# Patient Record
Sex: FEMALE | Race: WHITE | Employment: OTHER | URBAN - METROPOLITAN AREA
[De-identification: names, ages, dates, MRNs, and addresses within clinical notes are randomized per-mention and may not be internally consistent; named-entity substitution may affect disease eponyms.]

---

## 2017-03-07 ENCOUNTER — APPOINTMENT (OUTPATIENT)
Dept: CT IMAGING | Age: 77
End: 2017-03-07
Attending: EMERGENCY MEDICINE
Payer: MEDICARE

## 2017-03-07 ENCOUNTER — APPOINTMENT (OUTPATIENT)
Dept: GENERAL RADIOLOGY | Age: 77
End: 2017-03-07
Attending: EMERGENCY MEDICINE
Payer: MEDICARE

## 2017-03-07 ENCOUNTER — HOSPITAL ENCOUNTER (EMERGENCY)
Age: 77
Discharge: HOME | End: 2017-03-07
Attending: EMERGENCY MEDICINE
Payer: MEDICARE

## 2017-03-07 VITALS
WEIGHT: 150 LBS | TEMPERATURE: 98.2 F | DIASTOLIC BLOOD PRESSURE: 66 MMHG | SYSTOLIC BLOOD PRESSURE: 115 MMHG | HEIGHT: 60 IN | RESPIRATION RATE: 18 BRPM | OXYGEN SATURATION: 96 % | BODY MASS INDEX: 29.45 KG/M2 | HEART RATE: 96 BPM

## 2017-03-07 DIAGNOSIS — S30.1XXA ABDOMINAL WALL HEMATOMA, INITIAL ENCOUNTER: Primary | ICD-10-CM

## 2017-03-07 DIAGNOSIS — E27.8 ADRENAL NODULE (HCC): ICD-10-CM

## 2017-03-07 DIAGNOSIS — S20.219A: ICD-10-CM

## 2017-03-07 LAB
ALBUMIN SERPL BCP-MCNC: 3.6 G/DL (ref 3.2–4.6)
ALBUMIN/GLOB SERPL: 1 {RATIO} (ref 1.2–3.5)
ALP SERPL-CCNC: 127 U/L (ref 50–136)
ALT SERPL-CCNC: 20 U/L (ref 12–65)
AMYLASE SERPL-CCNC: 63 U/L (ref 25–115)
ANION GAP BLD CALC-SCNC: 11 MMOL/L (ref 7–16)
AST SERPL W P-5'-P-CCNC: 23 U/L (ref 15–37)
BASOPHILS # BLD AUTO: 0 K/UL (ref 0–0.2)
BASOPHILS # BLD: 0 % (ref 0–2)
BILIRUB SERPL-MCNC: 0.4 MG/DL (ref 0.2–1.1)
BUN SERPL-MCNC: 29 MG/DL (ref 8–23)
CALCIUM SERPL-MCNC: 9.4 MG/DL (ref 8.3–10.4)
CHLORIDE SERPL-SCNC: 102 MMOL/L (ref 98–107)
CO2 SERPL-SCNC: 23 MMOL/L (ref 21–32)
CREAT SERPL-MCNC: 1.32 MG/DL (ref 0.6–1)
DIFFERENTIAL METHOD BLD: ABNORMAL
EOSINOPHIL # BLD: 0.1 K/UL (ref 0–0.8)
EOSINOPHIL NFR BLD: 1 % (ref 0.5–7.8)
ERYTHROCYTE [DISTWIDTH] IN BLOOD BY AUTOMATED COUNT: 13.6 % (ref 11.9–14.6)
GLOBULIN SER CALC-MCNC: 3.7 G/DL (ref 2.3–3.5)
GLUCOSE SERPL-MCNC: 103 MG/DL (ref 65–100)
HCT VFR BLD AUTO: 35.5 % (ref 35.8–46.3)
HGB BLD-MCNC: 12 G/DL (ref 11.7–15.4)
IMM GRANULOCYTES # BLD: 0 K/UL (ref 0–0.5)
IMM GRANULOCYTES NFR BLD AUTO: 0.3 % (ref 0–5)
LIPASE SERPL-CCNC: 183 U/L (ref 73–393)
LYMPHOCYTES # BLD AUTO: 13 % (ref 13–44)
LYMPHOCYTES # BLD: 2 K/UL (ref 0.5–4.6)
MCH RBC QN AUTO: 30.3 PG (ref 26.1–32.9)
MCHC RBC AUTO-ENTMCNC: 33.8 G/DL (ref 31.4–35)
MCV RBC AUTO: 89.6 FL (ref 79.6–97.8)
MONOCYTES # BLD: 0.9 K/UL (ref 0.1–1.3)
MONOCYTES NFR BLD AUTO: 6 % (ref 4–12)
NEUTS SEG # BLD: 12.6 K/UL (ref 1.7–8.2)
NEUTS SEG NFR BLD AUTO: 80 % (ref 43–78)
PLATELET # BLD AUTO: 288 K/UL (ref 150–450)
PMV BLD AUTO: 9.7 FL (ref 10.8–14.1)
POTASSIUM SERPL-SCNC: 4.1 MMOL/L (ref 3.5–5.1)
PROT SERPL-MCNC: 7.3 G/DL (ref 6.3–8.2)
RBC # BLD AUTO: 3.96 M/UL (ref 4.05–5.25)
SODIUM SERPL-SCNC: 136 MMOL/L (ref 136–145)
WBC # BLD AUTO: 15.7 K/UL (ref 4.3–11.1)

## 2017-03-07 PROCEDURE — 82150 ASSAY OF AMYLASE: CPT | Performed by: EMERGENCY MEDICINE

## 2017-03-07 PROCEDURE — 96375 TX/PRO/DX INJ NEW DRUG ADDON: CPT | Performed by: EMERGENCY MEDICINE

## 2017-03-07 PROCEDURE — 85025 COMPLETE CBC W/AUTO DIFF WBC: CPT | Performed by: EMERGENCY MEDICINE

## 2017-03-07 PROCEDURE — 96374 THER/PROPH/DIAG INJ IV PUSH: CPT | Performed by: EMERGENCY MEDICINE

## 2017-03-07 PROCEDURE — 74011636320 HC RX REV CODE- 636/320: Performed by: EMERGENCY MEDICINE

## 2017-03-07 PROCEDURE — 73562 X-RAY EXAM OF KNEE 3: CPT

## 2017-03-07 PROCEDURE — 74011250636 HC RX REV CODE- 250/636: Performed by: EMERGENCY MEDICINE

## 2017-03-07 PROCEDURE — 83690 ASSAY OF LIPASE: CPT | Performed by: EMERGENCY MEDICINE

## 2017-03-07 PROCEDURE — 96361 HYDRATE IV INFUSION ADD-ON: CPT | Performed by: EMERGENCY MEDICINE

## 2017-03-07 PROCEDURE — 74177 CT ABD & PELVIS W/CONTRAST: CPT

## 2017-03-07 PROCEDURE — 99284 EMERGENCY DEPT VISIT MOD MDM: CPT | Performed by: EMERGENCY MEDICINE

## 2017-03-07 PROCEDURE — 71250 CT THORAX DX C-: CPT

## 2017-03-07 PROCEDURE — 80053 COMPREHEN METABOLIC PANEL: CPT | Performed by: EMERGENCY MEDICINE

## 2017-03-07 PROCEDURE — 74011000258 HC RX REV CODE- 258: Performed by: EMERGENCY MEDICINE

## 2017-03-07 RX ORDER — MORPHINE SULFATE 4 MG/ML
2 INJECTION, SOLUTION INTRAMUSCULAR; INTRAVENOUS
Status: COMPLETED | OUTPATIENT
Start: 2017-03-07 | End: 2017-03-07

## 2017-03-07 RX ORDER — SODIUM CHLORIDE 0.9 % (FLUSH) 0.9 %
10 SYRINGE (ML) INJECTION
Status: COMPLETED | OUTPATIENT
Start: 2017-03-07 | End: 2017-03-07

## 2017-03-07 RX ORDER — SODIUM CHLORIDE 9 MG/ML
125 INJECTION, SOLUTION INTRAVENOUS CONTINUOUS
Status: DISCONTINUED | OUTPATIENT
Start: 2017-03-07 | End: 2017-03-07 | Stop reason: HOSPADM

## 2017-03-07 RX ORDER — ONDANSETRON 2 MG/ML
4 INJECTION INTRAMUSCULAR; INTRAVENOUS
Status: COMPLETED | OUTPATIENT
Start: 2017-03-07 | End: 2017-03-07

## 2017-03-07 RX ADMIN — Medication 10 ML: at 19:14

## 2017-03-07 RX ADMIN — MORPHINE SULFATE 2 MG: 4 INJECTION, SOLUTION INTRAMUSCULAR; INTRAVENOUS at 18:44

## 2017-03-07 RX ADMIN — SODIUM CHLORIDE 1000 ML: 900 INJECTION, SOLUTION INTRAVENOUS at 16:45

## 2017-03-07 RX ADMIN — ONDANSETRON 4 MG: 2 INJECTION, SOLUTION INTRAMUSCULAR; INTRAVENOUS at 18:44

## 2017-03-07 RX ADMIN — SODIUM CHLORIDE 125 ML/HR: 900 INJECTION, SOLUTION INTRAVENOUS at 19:15

## 2017-03-07 RX ADMIN — SODIUM CHLORIDE 100 ML: 900 INJECTION, SOLUTION INTRAVENOUS at 19:14

## 2017-03-07 RX ADMIN — IOVERSOL 100 ML: 741 INJECTION INTRA-ARTERIAL; INTRAVENOUS at 19:14
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
## 2017-03-07 NOTE — ED NOTES
Assumed care of patient at shift change. Patient history, physical, and results reviewed by myself. Independent exam performed. Pending CT chest and abdomen. Pt does not want anything for pain at this time.
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
## 2017-03-08 NOTE — ED NOTES
I have reviewed discharge instructions with the patient. The patient verbalized understanding. Pt denies any further needs, questions or concerns. Pt to the lobby for discharge via wheelchair with family here to take her home.
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
## 2017-05-24 ENCOUNTER — HOSPITAL ENCOUNTER (INPATIENT)
Age: 77
LOS: 3 days | Discharge: HOME HEALTH | DRG: 191 | End: 2017-05-27
Attending: EMERGENCY MEDICINE | Admitting: INTERNAL MEDICINE
Payer: MEDICARE

## 2017-05-24 ENCOUNTER — APPOINTMENT (OUTPATIENT)
Dept: GENERAL RADIOLOGY | Age: 77
DRG: 191 | End: 2017-05-24
Attending: EMERGENCY MEDICINE
Payer: MEDICARE

## 2017-05-24 DIAGNOSIS — J40 BRONCHITIS: ICD-10-CM

## 2017-05-24 DIAGNOSIS — B96.89 ACUTE BACTERIAL SINUSITIS: ICD-10-CM

## 2017-05-24 DIAGNOSIS — J01.90 ACUTE BACTERIAL SINUSITIS: ICD-10-CM

## 2017-05-24 DIAGNOSIS — E11.9 TYPE 2 DIABETES MELLITUS WITHOUT COMPLICATION, WITHOUT LONG-TERM CURRENT USE OF INSULIN (HCC): Chronic | ICD-10-CM

## 2017-05-24 DIAGNOSIS — J44.1 COPD EXACERBATION (HCC): ICD-10-CM

## 2017-05-24 DIAGNOSIS — J47.1 BRONCHIECTASIS WITH ACUTE EXACERBATION (HCC): ICD-10-CM

## 2017-05-24 DIAGNOSIS — J44.1 ACUTE EXACERBATION OF CHRONIC OBSTRUCTIVE PULMONARY DISEASE (COPD) (HCC): Primary | ICD-10-CM

## 2017-05-24 LAB
ALBUMIN SERPL BCP-MCNC: 3.2 G/DL (ref 3.2–4.6)
ALBUMIN/GLOB SERPL: 0.7 {RATIO} (ref 1.2–3.5)
ALP SERPL-CCNC: 142 U/L (ref 50–136)
ALT SERPL-CCNC: 13 U/L (ref 12–65)
ANION GAP BLD CALC-SCNC: 13 MMOL/L (ref 7–16)
AST SERPL W P-5'-P-CCNC: 10 U/L (ref 15–37)
BACTERIA URNS QL MICRO: NORMAL /HPF
BASOPHILS # BLD AUTO: 0 K/UL (ref 0–0.2)
BASOPHILS # BLD: 0 % (ref 0–2)
BILIRUB SERPL-MCNC: 0.6 MG/DL (ref 0.2–1.1)
BUN SERPL-MCNC: 19 MG/DL (ref 8–23)
CALCIUM SERPL-MCNC: 9.3 MG/DL (ref 8.3–10.4)
CASTS URNS QL MICRO: NORMAL /LPF
CHLORIDE SERPL-SCNC: 99 MMOL/L (ref 98–107)
CO2 SERPL-SCNC: 23 MMOL/L (ref 21–32)
CREAT SERPL-MCNC: 1.01 MG/DL (ref 0.6–1)
CRYSTALS URNS QL MICRO: 0 /LPF
DIFFERENTIAL METHOD BLD: ABNORMAL
EOSINOPHIL # BLD: 0 K/UL (ref 0–0.8)
EOSINOPHIL NFR BLD: 0 % (ref 0.5–7.8)
EPI CELLS #/AREA URNS HPF: 0 /HPF
ERYTHROCYTE [DISTWIDTH] IN BLOOD BY AUTOMATED COUNT: 13.1 % (ref 11.9–14.6)
GLOBULIN SER CALC-MCNC: 4.4 G/DL (ref 2.3–3.5)
GLUCOSE SERPL-MCNC: 144 MG/DL (ref 65–100)
HCT VFR BLD AUTO: 36.6 % (ref 35.8–46.3)
HGB BLD-MCNC: 12.7 G/DL (ref 11.7–15.4)
IMM GRANULOCYTES # BLD: 0.1 K/UL (ref 0–0.5)
IMM GRANULOCYTES NFR BLD AUTO: 0.3 % (ref 0–5)
LACTATE BLD-SCNC: 1.7 MMOL/L (ref 0.5–1.9)
LYMPHOCYTES # BLD AUTO: 13 % (ref 13–44)
LYMPHOCYTES # BLD: 2.4 K/UL (ref 0.5–4.6)
MCH RBC QN AUTO: 31 PG (ref 26.1–32.9)
MCHC RBC AUTO-ENTMCNC: 34.7 G/DL (ref 31.4–35)
MCV RBC AUTO: 89.3 FL (ref 79.6–97.8)
MONOCYTES # BLD: 1.2 K/UL (ref 0.1–1.3)
MONOCYTES NFR BLD AUTO: 7 % (ref 4–12)
MUCOUS THREADS URNS QL MICRO: 0 /LPF
NEUTS SEG # BLD: 14.8 K/UL (ref 1.7–8.2)
NEUTS SEG NFR BLD AUTO: 80 % (ref 43–78)
PLATELET # BLD AUTO: 279 K/UL (ref 150–450)
PMV BLD AUTO: 9.7 FL (ref 10.8–14.1)
POTASSIUM SERPL-SCNC: 4.4 MMOL/L (ref 3.5–5.1)
PROT SERPL-MCNC: 7.6 G/DL (ref 6.3–8.2)
RBC # BLD AUTO: 4.1 M/UL (ref 4.05–5.25)
RBC #/AREA URNS HPF: NORMAL /HPF
SODIUM SERPL-SCNC: 135 MMOL/L (ref 136–145)
WBC # BLD AUTO: 18.6 K/UL (ref 4.3–11.1)
WBC URNS QL MICRO: NORMAL /HPF

## 2017-05-24 PROCEDURE — 94640 AIRWAY INHALATION TREATMENT: CPT

## 2017-05-24 PROCEDURE — 99223 1ST HOSP IP/OBS HIGH 75: CPT | Performed by: INTERNAL MEDICINE

## 2017-05-24 PROCEDURE — 96365 THER/PROPH/DIAG IV INF INIT: CPT | Performed by: EMERGENCY MEDICINE

## 2017-05-24 PROCEDURE — 74011000258 HC RX REV CODE- 258: Performed by: EMERGENCY MEDICINE

## 2017-05-24 PROCEDURE — 65270000029 HC RM PRIVATE

## 2017-05-24 PROCEDURE — 80053 COMPREHEN METABOLIC PANEL: CPT | Performed by: EMERGENCY MEDICINE

## 2017-05-24 PROCEDURE — 74011250637 HC RX REV CODE- 250/637: Performed by: INTERNAL MEDICINE

## 2017-05-24 PROCEDURE — 83605 ASSAY OF LACTIC ACID: CPT

## 2017-05-24 PROCEDURE — 81015 MICROSCOPIC EXAM OF URINE: CPT | Performed by: EMERGENCY MEDICINE

## 2017-05-24 PROCEDURE — 74011000250 HC RX REV CODE- 250: Performed by: INTERNAL MEDICINE

## 2017-05-24 PROCEDURE — 87804 INFLUENZA ASSAY W/OPTIC: CPT | Performed by: INTERNAL MEDICINE

## 2017-05-24 PROCEDURE — 85025 COMPLETE CBC W/AUTO DIFF WBC: CPT | Performed by: EMERGENCY MEDICINE

## 2017-05-24 PROCEDURE — 71020 XR CHEST PA LAT: CPT

## 2017-05-24 PROCEDURE — 96375 TX/PRO/DX INJ NEW DRUG ADDON: CPT | Performed by: EMERGENCY MEDICINE

## 2017-05-24 PROCEDURE — 74011250636 HC RX REV CODE- 250/636: Performed by: EMERGENCY MEDICINE

## 2017-05-24 PROCEDURE — 74011250637 HC RX REV CODE- 250/637: Performed by: EMERGENCY MEDICINE

## 2017-05-24 PROCEDURE — 87040 BLOOD CULTURE FOR BACTERIA: CPT | Performed by: EMERGENCY MEDICINE

## 2017-05-24 PROCEDURE — 81003 URINALYSIS AUTO W/O SCOPE: CPT | Performed by: EMERGENCY MEDICINE

## 2017-05-24 PROCEDURE — 99285 EMERGENCY DEPT VISIT HI MDM: CPT | Performed by: EMERGENCY MEDICINE

## 2017-05-24 PROCEDURE — 74011000250 HC RX REV CODE- 250: Performed by: EMERGENCY MEDICINE

## 2017-05-24 PROCEDURE — 74011250636 HC RX REV CODE- 250/636: Performed by: INTERNAL MEDICINE

## 2017-05-24 RX ORDER — HYDROCODONE BITARTRATE AND HOMATROPINE METHYLBROMIDE 5; 1.5 MG/1; MG/1
1 TABLET ORAL
Status: DISCONTINUED | OUTPATIENT
Start: 2017-05-24 | End: 2017-05-27 | Stop reason: HOSPADM

## 2017-05-24 RX ORDER — SODIUM CHLORIDE 0.9 % (FLUSH) 0.9 %
5-10 SYRINGE (ML) INJECTION EVERY 8 HOURS
Status: DISCONTINUED | OUTPATIENT
Start: 2017-05-24 | End: 2017-05-27 | Stop reason: HOSPADM

## 2017-05-24 RX ORDER — ENOXAPARIN SODIUM 100 MG/ML
40 INJECTION SUBCUTANEOUS EVERY 24 HOURS
Status: DISCONTINUED | OUTPATIENT
Start: 2017-05-24 | End: 2017-05-27 | Stop reason: HOSPADM

## 2017-05-24 RX ORDER — SODIUM CHLORIDE 0.9 % (FLUSH) 0.9 %
5-10 SYRINGE (ML) INJECTION EVERY 8 HOURS
Status: DISCONTINUED | OUTPATIENT
Start: 2017-05-24 | End: 2017-05-24 | Stop reason: SDUPTHER

## 2017-05-24 RX ORDER — DULOXETIN HYDROCHLORIDE 60 MG/1
60 CAPSULE, DELAYED RELEASE ORAL 2 TIMES DAILY
Status: DISCONTINUED | OUTPATIENT
Start: 2017-05-24 | End: 2017-05-27 | Stop reason: HOSPADM

## 2017-05-24 RX ORDER — AMLODIPINE BESYLATE 5 MG/1
5 TABLET ORAL
Status: DISCONTINUED | OUTPATIENT
Start: 2017-05-24 | End: 2017-05-27 | Stop reason: HOSPADM

## 2017-05-24 RX ORDER — SODIUM CHLORIDE 0.9 % (FLUSH) 0.9 %
5-10 SYRINGE (ML) INJECTION AS NEEDED
Status: DISCONTINUED | OUTPATIENT
Start: 2017-05-24 | End: 2017-05-27 | Stop reason: HOSPADM

## 2017-05-24 RX ORDER — INSULIN LISPRO 100 [IU]/ML
INJECTION, SOLUTION INTRAVENOUS; SUBCUTANEOUS
Status: DISCONTINUED | OUTPATIENT
Start: 2017-05-24 | End: 2017-05-27 | Stop reason: HOSPADM

## 2017-05-24 RX ORDER — MONTELUKAST SODIUM 10 MG/1
10 TABLET ORAL
Status: DISCONTINUED | OUTPATIENT
Start: 2017-05-24 | End: 2017-05-27 | Stop reason: HOSPADM

## 2017-05-24 RX ORDER — DOXYCYCLINE 100 MG/1
100 CAPSULE ORAL
Status: COMPLETED | OUTPATIENT
Start: 2017-05-24 | End: 2017-05-24

## 2017-05-24 RX ORDER — BUDESONIDE 0.5 MG/2ML
500 INHALANT ORAL
Status: DISCONTINUED | OUTPATIENT
Start: 2017-05-24 | End: 2017-05-27 | Stop reason: HOSPADM

## 2017-05-24 RX ORDER — CARISOPRODOL 350 MG/1
175 TABLET ORAL
Status: DISCONTINUED | OUTPATIENT
Start: 2017-05-24 | End: 2017-05-27 | Stop reason: HOSPADM

## 2017-05-24 RX ORDER — ASPIRIN 81 MG/1
81 TABLET ORAL
Status: DISCONTINUED | OUTPATIENT
Start: 2017-05-25 | End: 2017-05-27 | Stop reason: HOSPADM

## 2017-05-24 RX ORDER — IPRATROPIUM BROMIDE AND ALBUTEROL SULFATE 2.5; .5 MG/3ML; MG/3ML
3 SOLUTION RESPIRATORY (INHALATION)
Status: COMPLETED | OUTPATIENT
Start: 2017-05-24 | End: 2017-05-24

## 2017-05-24 RX ORDER — ALBUTEROL SULFATE 0.83 MG/ML
2.5 SOLUTION RESPIRATORY (INHALATION)
Status: DISCONTINUED | OUTPATIENT
Start: 2017-05-25 | End: 2017-05-27 | Stop reason: HOSPADM

## 2017-05-24 RX ORDER — LORATADINE 10 MG/1
10 TABLET ORAL DAILY
Status: DISCONTINUED | OUTPATIENT
Start: 2017-05-25 | End: 2017-05-27 | Stop reason: HOSPADM

## 2017-05-24 RX ORDER — LEVOTHYROXINE SODIUM 100 UG/1
100 TABLET ORAL
Status: DISCONTINUED | OUTPATIENT
Start: 2017-05-25 | End: 2017-05-27 | Stop reason: HOSPADM

## 2017-05-24 RX ORDER — PANTOPRAZOLE SODIUM 40 MG/1
40 TABLET, DELAYED RELEASE ORAL
Status: DISCONTINUED | OUTPATIENT
Start: 2017-05-25 | End: 2017-05-27 | Stop reason: HOSPADM

## 2017-05-24 RX ORDER — ALPRAZOLAM 0.25 MG/1
0.25 TABLET ORAL
Status: DISCONTINUED | OUTPATIENT
Start: 2017-05-24 | End: 2017-05-27 | Stop reason: HOSPADM

## 2017-05-24 RX ORDER — SODIUM CHLORIDE 0.9 % (FLUSH) 0.9 %
5-10 SYRINGE (ML) INJECTION AS NEEDED
Status: DISCONTINUED | OUTPATIENT
Start: 2017-05-24 | End: 2017-05-24 | Stop reason: SDUPTHER

## 2017-05-24 RX ORDER — HYDROCODONE BITARTRATE AND HOMATROPINE METHYLBROMIDE 1.5; 5 MG/5ML; MG/5ML
5 SYRUP ORAL
Status: COMPLETED | OUTPATIENT
Start: 2017-05-24 | End: 2017-05-24

## 2017-05-24 RX ADMIN — METHYLPREDNISOLONE SODIUM SUCCINATE 125 MG: 125 INJECTION, POWDER, FOR SOLUTION INTRAMUSCULAR; INTRAVENOUS at 16:27

## 2017-05-24 RX ADMIN — BUDESONIDE 500 MCG: 0.5 SUSPENSION RESPIRATORY (INHALATION) at 22:07

## 2017-05-24 RX ADMIN — CEFTRIAXONE 1 G: 1 INJECTION, POWDER, FOR SOLUTION INTRAMUSCULAR; INTRAVENOUS at 16:27

## 2017-05-24 RX ADMIN — HYDROCODONE BITARTRATE AND HOMATROPINE METHYLBROMIDE 5 ML: 5; 1.5 SOLUTION ORAL at 19:23

## 2017-05-24 RX ADMIN — AMLODIPINE BESYLATE 5 MG: 10 TABLET ORAL at 21:45

## 2017-05-24 RX ADMIN — IPRATROPIUM BROMIDE AND ALBUTEROL SULFATE 3 ML: 2.5; .5 SOLUTION RESPIRATORY (INHALATION) at 16:30

## 2017-05-24 RX ADMIN — DOXYCYCLINE HYCLATE 100 MG: 100 CAPSULE ORAL at 18:33

## 2017-05-24 NOTE — ED TRIAGE NOTES
Cough and congestion, states that her MD called her in an antibiotic yesterday. States that she took a Z-deepak a week or so ago also. Just feels like she is getting worse.

## 2017-05-24 NOTE — IP AVS SNAPSHOT
Current Discharge Medication List  
  
START taking these medications Dose & Instructions Dispensing Information Comments Morning Noon Evening Bedtime  
 amoxicillin-clavulanate 875-125 mg per tablet Commonly known as:  AUGMENTIN Your last dose was: Your next dose is:    
   
   
 Dose:  1 Tab Take 1 Tab by mouth every twelve (12) hours. Quantity:  8 Tab Refills:  0  
     
   
   
   
  
 predniSONE 10 mg tablet Commonly known as:  Tamekaberlin Myerswell Start taking on:  5/28/2017 Your last dose was: Your next dose is:    
   
   
 Dose:  40 mg Take 4 Tabs by mouth daily (with breakfast). 40mg per day for 3 days, then 30mg per day for 3 days, 20mg per day for 3 days then 10mg per day for 3 days, then stop. Quantity:  30 Tab Refills:  0 CONTINUE these medications which have NOT CHANGED Dose & Instructions Dispensing Information Comments Morning Noon Evening Bedtime ALPRAZolam 0.5 mg tablet Commonly known as:  César Madrigal Your last dose was: Your next dose is:    
   
   
 1/2 to 1 tablet three times daily, as needed; can take 1 1/2 or two at bedtime Quantity:  60 Tab Refills:  5  
     
   
   
   
  
 amLODIPine 5 mg tablet Commonly known as:  Jack Sadler Your last dose was: Your next dose is:    
   
   
 Dose:  5 mg Take 1 Tab by mouth nightly. Indications: Hypertension Quantity:  90 Tab Refills:  3  
     
   
   
   
  
 aspirin delayed-release 81 mg tablet Your last dose was: Your next dose is:    
   
   
 Dose:  81 mg Take 81 mg by mouth every morning. Indications: MYOCARDIAL INFARCTION PREVENTION, has not had since SCS trial 11/9/15 Refills:  0  
     
   
   
   
  
 budesonide-formoterol 160-4.5 mcg/actuation HFA inhaler Commonly known as:  SYMBICORT Your last dose was: Your next dose is:    
   
   
 Dose:  2 Puff Take 2 Puffs by inhalation two (2) times a day. Quantity:  1 Inhaler Refills:  11  
     
   
   
   
  
 calcium citrate-vitamin d3 315-200 mg-unit Tab Commonly known as:  CITRACAL+D Your last dose was: Your next dose is:    
   
   
 Dose:  2 Tab Take 2 Tabs by mouth two (2) times a day. Indications: POST-MENOPAUSAL OSTEOPOROSIS PREVENTION, crush pills with pill  Quantity:  120 Tab Refills:  11  
     
   
   
   
  
 cetirizine 10 mg tablet Commonly known as:  ZYRTEC Your last dose was: Your next dose is:    
   
   
 Dose:  10 mg Take 10 mg by mouth daily. Refills:  0  
     
   
   
   
  
 DALIRESP Tab tablet Generic drug:  roflumilast  
   
Your last dose was: Your next dose is:    
   
   
 Dose:  500 mcg Take 500 mcg by mouth daily. Refills:  0 DULoxetine 60 mg capsule Commonly known as:  CYMBALTA Your last dose was: Your next dose is:    
   
   
 Dose:  60 mg Take 1 Cap by mouth two (2) times a day. Indications: ANXIETY WITH DEPRESSION, CHRONIC MUSCULOSKELETAL PAIN Quantity:  180 Cap Refills:  3  
     
   
   
   
  
 levothyroxine 100 mcg tablet Commonly known as:  SYNTHROID Your last dose was: Your next dose is:    
   
   
 Dose:  100 mcg Take 1 Tab by mouth Daily (before breakfast). Indications: take dos Quantity:  90 Tab Refills:  3  
     
   
   
   
  
 montelukast 10 mg tablet Commonly known as:  SINGULAIR Your last dose was: Your next dose is:    
   
   
 Dose:  10 mg Take 1 Tab by mouth nightly. Indications: ASTHMA PREVENTION Quantity:  90 Tab Refills:  3  
     
   
   
   
  
 omeprazole 40 mg capsule Commonly known as:  PRILOSEC Your last dose was: Your next dose is:    
   
   
 Dose:  40 mg Take 1 Cap by mouth daily. Take one hour before supper on an empty stomach Quantity:  90 Cap Refills:  3 PROBIOTIC 4X 10-15 mg Tbec Generic drug:  B.infantis-B.ani-B.long-B.bifi Your last dose was: Your next dose is:    
   
   
 Dose:  1 Tab Take 1 Tab by mouth daily (with lunch). Refills:  0 PROVENTIL HFA 90 mcg/actuation inhaler Generic drug:  albuterol Your last dose was: Your next dose is:    
   
   
 Dose:  2 Puff Take 2 Puffs by inhalation every four (4) hours as needed for Wheezing. Refills:  0 Where to Get Your Medications These medications were sent to [address] [address] Phone:  [phone]  
  amoxicillin-clavulanate 875-125 mg per tablet Information on where to get these meds will be given to you by the nurse or doctor. ! Ask your nurse or doctor about these medications  
  predniSONE 10 mg tablet

## 2017-05-24 NOTE — H&P
HISTORY AND PHYSICAL      Guerda Quesada    5/24/2017    Date of Admission:  5/24/2017    The patient's chart is reviewed and the patient is discussed with the staff. Subjective:     Patient is a 65 y.o.  female presents with cough and shortness of breath. She states she has a history of COPD and is on symbicort and prn albuterol at home. She reports that about 2 weeks ago she developed sinus congestion and pressure. She was treated with z pack by her pcp but her symptoms progressively worsened. She then developed cough productive of purulent sounding sputum as well as fevers and diffuse myalgias. She reports wheeze and shortness of breath. [facility] but WBC was 18 and she had diffuse wheezing. She has been treated in the ER but is felt to need hospital admission for ongoing wheeze and COPD exacerbation. Review of Systems  A comprehensive review of systems was negative except for that written in the HPI.     Patient Active Problem List   Diagnosis Code    Carotid stenosis, right I65.21    Left carotid stenosis I65.22    Hypertension I10    COPD exacerbation ([facility] [address] J44.1    Hypoxemia R09.02    Obesity E66.9    GERD (gastroesophageal reflux disease) K21.9    Arthritis M19.90    Depression F32.9    Anxiety F41.9    Hypothyroidism E03.9    Abnormal chest CT R93.8    Parotitis K11.20    Pulmonary hypertension ([facility]) I27.2    COPD (chronic obstructive pulmonary disease) ([facility]) J44.9    LUIS FERNANDO on CPAP G47.33, Z99.89    Pulmonary nodule R91.1    S/p bilateral carotid endarterectomy Z98.890    CAD (coronary artery disease) I25.10    Diabetes mellitus, type II ([facility]) E11.9    Hyperlipidemia E78.5    Carotid artery stenosis without cerebral infarction I65.29    Dyspnea/shortness of breath R06.00    Spondylosis with myelopathy, lumbar region M47.16    Chronic pain syndrome G89.4    Proteinuria R80.9    [facility] I77.9    Pruritic disorder L29.9    Cholelithiasis K80.20    Diverticulitis K57.92    Menopausal disorder N95.9    Chronic back pain M54.9, G89.29    Neuropathy G62.9    Bronchiectasis ([facility]) J47.9    Asthma J45.909    Leg pain M79.606    Macular degeneration H35.30    PVD (peripheral vascular disease) ([facility]) I73.9    Chronic restrictive lung disease J98.4    Chronic renal insufficiency N18.9    Congestive heart failure (CHF) ([facility]) I50.9    Bronchitis J40    Acute bacterial sinusitis J01.90, B96.89       Prior to Admission Medications   Prescriptions Last Dose Informant Patient Reported? Taking? ALPRAZolam (XANAX) 0.5 mg tablet   No No   Si/2 to 1 tablet three times daily, as needed; can take 1 1/2 or two at bedtime   B.infantis-B.ani-B.long-B.bifi (PROBIOTIC 4X) 10-15 mg TbEC   Yes No   Sig: Take 1 Tab by mouth daily (with lunch). DULoxetine (CYMBALTA) 60 mg capsule   No No   Sig: Take 1 Cap by mouth two (2) times a day. Indications: ANXIETY WITH DEPRESSION, CHRONIC MUSCULOSKELETAL PAIN   amLODIPine (NORVASC) 5 mg tablet   No No   Sig: Take 1 Tab by mouth nightly. Indications: Hypertension   aspirin delayed-release 81 mg tablet   Yes No   Sig: Take 81 mg by mouth every morning. Indications: MYOCARDIAL INFARCTION PREVENTION, has not had since [facility] trial 11/9/15   budesonide-formoterol (SYMBICORT) 160-4.5 mcg/actuation HFA inhaler   No No   Sig: Take 2 Puffs by inhalation two (2) times a day. calcium citrate-vitamin d3 (CITRACAL+D) 315-200 mg-unit tab   No No   Sig: Take 2 Tabs by mouth two (2) times a day. Indications: POST-MENOPAUSAL OSTEOPOROSIS PREVENTION, crush pills with pill    carisoprodol (SOMA) 350 mg tablet   No No   Sig: Take 0.5-1 Tabs by mouth nightly. Max Daily Amount: 350 mg. Indications: MUSCLE SPASM   cetirizine (ZYRTEC) 10 mg tablet   Yes No   Sig: Take 10 mg by mouth daily.    levothyroxine (SYNTHROID) 100 mcg tablet   No No   Sig: Take 1 Tab by mouth Daily (before breakfast). Indications: take dos   montelukast (SINGULAIR) 10 mg tablet   No No   Sig: Take 1 Tab by mouth nightly. Indications: ASTHMA PREVENTION   omeprazole (PRILOSEC) 40 mg capsule   No No   Sig: Take 1 Cap by mouth daily. Take one hour before supper on an empty stomach   roflumilast (DALIRESP) tab tablet   Yes No   Sig: Take 500 mcg by mouth daily. Facility-Administered Medications: None       Past Medical History:   Diagnosis Date    Abnormal chest CT 1/13/2015    Impression:  1. No evidence of pulmonary embolism. 2. Bilateral lung opacities (most prominent in the lower lobes) are nonspecific  but likely infectious or inflammatory, possibly aspiration related. 3. Emphysema. 4. Tiny indeterminate lung nodules for which followup CT is recommended in 6  months. 5. Atherosclerotic disease.  Allergic rhinitis     Anxiety 1/12/2015    Arthritis     Asthma     Bronchiectasis ([facility] [address]     CAD (coronary artery disease) 06/17/2015    CT of chest March 2017 showed coronary calcification also    Carotid artery stenosis without cerebral infarction 9/28/2015    1. Bilateral CEA in 2014.  Carotid stenosis, right 4/8/2014 4/14/14 (Dr. Kike Schwartz) Right carotid endarterectomy with bovine pericardium  patch.       Cholelithiasis     Chronic back pain     Chronic pain syndrome 11/25/2015    Chronic renal insufficiency     Chronic restrictive lung disease     Congestive heart failure (CHF) (HCC)     COPD (chronic obstructive pulmonary disease) (HCC)     Decreased vision     Depression     and anxiety     Diabetes mellitus, type II ([facility] [address]     Diarrhea     Diverticulitis     Dizziness     Dry eyes     Dyspnea/shortness of breath 9/28/2015    GERD (gastroesophageal reflux disease)     not well controlled    Glaucoma     History of pneumonia 1/2015    Hyperkalemia     Hyperlipidemia      Hypertension     Hyposmolality and/or hyponatremia     Hypothyroidism     Leg pain     Macular degeneration     Menopausal disorder     Neuropathy     LUIS FERNANDO on CPAP 4/9/2015    Proteinuria     Pruritic disorder     [facility]) [facility]     SOB (shortness of breath) on exertion     Spinal stenosis     Trigger thumb     Unspecified asthma     inhalers as needed    Unspecified sleep apnea     cpap non- compliant at times    Venous stasis      Past Surgical History:   Procedure Laterality Date    HX APPENDECTOMY      HX BACK SURGERY  11/9/15    [facility] TRIAL    HX CAROTID ENDARTERECTOMY Left 1/14    HX CAROTID ENDARTERECTOMY Right 4/14    HX CATARACT REMOVAL Bilateral     2014 - Right first; left second    HX LUMBAR FUSION      HX MYRINGOTOMY Right     x2    HX OTHER SURGICAL  02/14/2000    back synovial cyst    HX HERNÁN AND BSO  1978    pain and bleeding     Social History     Social History    Marital status:      Spouse name: N/A    Number of children: N/A    Years of education: N/A     Occupational History    Not on file. Social History Main Topics    Smoking status: Former Smoker     Packs/day: 1.00     Years: 30.00    Smokeless tobacco: Never Used      Comment: quit 2011    Alcohol use No    Drug use: No    Sexual activity: Not on file     Other Topics Concern    Not on file     Social History Narrative    Lives alone, granddaughter lives next door. 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       Family History   Problem Relation Age of Onset    Diabetes Brother     Cancer Mother     Breast Cancer Mother     Heart Disease Father     Heart Attack Father     Cancer Sister      uterine     Allergies   Allergen Reactions    Sulfa (Sulfonamide Antibiotics) Rash    Abilify [Aripiprazole] Other (comments)     Causes involuntary mouth movement     Chantix [Varenicline] Nausea Only    Desyrel [Trazodone] Other (comments)     nightmares    Lipitor [Atorvastatin] Other (comments)     Legs hurt    Lisinopril Other (comments)     Cough? ?    Nolamine Other (comments)     Legs jerk    Other Medication Other (comments)     Hx of low sodium so watch meds that can aggravate that    Zocor [Simvastatin] Other (comments)     Causes leg pain. Lf leg. Current Facility-Administered Medications   Medication Dose Route Frequency    sodium chloride (NS) flush 5-10 mL  5-10 mL IntraVENous Q8H    sodium chloride (NS) flush 5-10 mL  5-10 mL IntraVENous PRN     Current Outpatient Prescriptions   Medication Sig    calcium citrate-vitamin d3 (CITRACAL+D) 315-200 mg-unit tab Take 2 Tabs by mouth two (2) times a day. Indications: POST-MENOPAUSAL OSTEOPOROSIS PREVENTION, crush pills with pill     DULoxetine (CYMBALTA) 60 mg capsule Take 1 Cap by mouth two (2) times a day. Indications: ANXIETY WITH DEPRESSION, CHRONIC MUSCULOSKELETAL PAIN    roflumilast (DALIRESP) tab tablet Take 500 mcg by mouth daily.  budesonide-formoterol (SYMBICORT) 160-4.5 mcg/actuation HFA inhaler Take 2 Puffs by inhalation two (2) times a day.  ALPRAZolam (XANAX) 0.5 mg tablet 1/2 to 1 tablet three times daily, as needed; can take 1 1/2 or two at bedtime    omeprazole (PRILOSEC) 40 mg capsule Take 1 Cap by mouth daily. Take one hour before supper on an empty stomach    amLODIPine (NORVASC) 5 mg tablet Take 1 Tab by mouth nightly. Indications: Hypertension    levothyroxine (SYNTHROID) 100 mcg tablet Take 1 Tab by mouth Daily (before breakfast). Indications: take dos    montelukast (SINGULAIR) 10 mg tablet Take 1 Tab by mouth nightly. Indications: ASTHMA PREVENTION    carisoprodol (SOMA) 350 mg tablet Take 0.5-1 Tabs by mouth nightly. Max Daily Amount: 350 mg. Indications: MUSCLE SPASM    cetirizine (ZYRTEC) 10 mg tablet Take 10 mg by mouth daily.  B.infantis-B.ani-B.long-B.bifi (PROBIOTIC 4X) 10-15 mg TbEC Take 1 Tab by mouth daily (with lunch).  aspirin delayed-release 81 mg tablet Take 81 mg by mouth every morning.  Indications: MYOCARDIAL INFARCTION PREVENTION, has not had since [facility] trial 11/9/15           Objective:     Vitals:    05/24/17 1520 05/24/17 1638 05/24/17 1720 05/24/17 1820   BP: 115/55  175/74 113/53   Pulse: 94  100 90   Resp:       Temp:       SpO2: 95% 95% 95% 95%   Weight:       Height:           PHYSICAL EXAM     Constitutional:  the patient is well developed and in no acute distress  EENMT:  Sclera clear, pupils equal, oral mucosa moist  Respiratory: Mild diffuse B exp wheeze. Cardiovascular:  RRR without M,G,R  Gastrointestinal: soft and non-tender; with positive bowel sounds. Musculoskeletal: warm without cyanosis. There is no lower leg edema. Skin:  no jaundice or rashes, no wounds   Neurologic: no gross neuro deficits     Psychiatric:  alert and oriented x ppt    CXR:  5/24/17  IMPRESSION: No acute findings or changes in the chest.       Recent Labs      05/24/17   1305   WBC  18.6*   HGB  12.7   HCT  36.6   PLT  279     Recent Labs      05/24/17   1305   NA  135*   K  4.4   CL  99   GLU  144*   CO2  23   BUN  19   CREA  1.01*   CA  9.3   ALB  3.2   TBILI  0.6   ALT  13   SGOT  10*     No results for input(s): PH, PCO2, PO2, HCO3 in the last 72 hours. No results for input(s): LCAD, LAC in the last 72 hours. Assessment:  (Medical Decision Making)     Hospital Problems  Date Reviewed: 4/25/2017          Codes Class Noted POA    Bronchitis ICD-10-CM: J40  ICD-9-CM: 841  5/24/2017 Unknown        Acute bacterial sinusitis ICD-10-CM: J01.90, B96.89  ICD-9-CM: 461.9  5/24/2017 Unknown        * (Principal)[facility] ICD-10-CM: J44.1  ICD-9-CM: 491.21  1/9/2015 Yes            Patient presenting with signs of COPD exacerbation likely brought on by sinusitis and URI. Plan:  (Medical Decision Making)     --Will admit for further medical management  --Supplemental O2 only if needed   --Respiratory nebulizer treatments: albuterol and pulmicort. --culture sputum if able.    --steroid therapy: IV solumedrol. --antibiotic therapy: ceftriaxone and doxy as she has recently received azithromycin and reported has not tolerated levaquin in the past.   --will flu swab given significant myalgias    More than 50% of the time documented was spent in face-to-face contact with the patient and in the care of the patient on the floor/unit where the patient is located.     Anna Casarez MD

## 2017-05-24 NOTE — IP AVS SNAPSHOT
[address] [facility] 
[phone] Patient: Gavino Khoury MRN: [id number] :[id number] You are allergic to the following Allergen Reactions Sulfa (Sulfonamide Antibiotics) Rash Abilify (Aripiprazole) Other (comments) Causes involuntary mouth movement Chantix (Varenicline) Nausea Only Desyrel (Trazodone) Other (comments)  
 nightmares Lipitor (Atorvastatin) Other (comments) Legs hurt Lisinopril Other (comments) Cough? ?  
    
 Nolamine Other (comments) Legs jerk Other Medication Other (comments) Hx of low sodium so watch meds that can aggravate that Zocor (Simvastatin) Other (comments) Causes leg pain. Lf leg. Recent Documentation Height Weight BMI OB Status Smoking Status 1.575 m 68.9 kg 27.76 kg/m2 Hysterectomy Former Smoker Unresulted Labs Order Current Status CULTURE, BLOOD Preliminary result CULTURE, BLOOD Preliminary result CULTURE, RESPIRATORY/SPUTUM/BRONCH W GRAM STAIN Preliminary result Emergency Contacts Name Discharge Info Relation Home Work Mobile Gayathri Cohn  Daughter [21] [phone] Av Yap  Daughter [21] [phone] About your hospitalization You were admitted on:  May 24, 2017 You last received care in the:  [facility] 6 MED SURG You were discharged on:  May 27, 2017 Unit phone number:  [phone] Why you were hospitalized Your primary diagnosis was:  Copd Exacerbation (Hcc) Your diagnoses also included:  Bronchitis, Acute Bacterial Sinusitis, Diabetes Mellitus, Type Ii (Hcc) Providers Seen During Your Hospitalizations Provider Role Specialty Primary office phone Yaneth Keane MD Attending Provider Emergency Medicine [phone] Juan Shaw MD Attending Provider Pulmonary Disease [phone] Your Primary Care Physician (PCP) Primary Care Physician Office Phone Office Fax [address] [facility], [address] [place] [address] [phone] Follow-up Information Follow up With Details Comments Contact Info [address] [place] [id number] 
[phone] Eliana Prater MD In 1 week  [address] [address] 
[phone] Your Appointments Tuesday June 27, 2017  2:00 PM EDT New Patient with Rufino Solomon MD  
[facility]) [address] [place] [address] [place] [id number] [phone] Current Discharge Medication List  
  
START taking these medications Dose & Instructions Dispensing Information Comments Morning Noon Evening Bedtime  
 amoxicillin-clavulanate 875-125 mg per tablet Commonly known as:  AUGMENTIN Your last dose was: Your next dose is:    
   
   
 Dose:  1 Tab Take 1 Tab by mouth every twelve (12) hours. Quantity:  8 Tab Refills:  0  
     
   
   
   
  
 predniSONE 10 mg tablet Commonly known as:  Shahana Berea Start taking on:  5/28/2017 Your last dose was: Your next dose is:    
   
   
 Dose:  40 mg Take 4 Tabs by mouth daily (with breakfast). 40mg per day for 3 days, then 30mg per day for 3 days, 20mg per day for 3 days then 10mg per day for 3 days, then stop. Quantity:  30 Tab Refills:  0 CONTINUE these medications which have NOT CHANGED Dose & Instructions Dispensing Information Comments Morning Noon Evening Bedtime ALPRAZolam 0.5 mg tablet Commonly known as:  Darrius Webb Your last dose was: Your next dose is:    
   
   
 1/2 to 1 tablet three times daily, as needed; can take 1 1/2 or two at bedtime Quantity:  60 Tab Refills:  5  
     
   
   
   
  
 amLODIPine 5 mg tablet Commonly known as:  Ajay Padilla Your last dose was: Your next dose is:    
   
   
 Dose:  5 mg Take 1 Tab by mouth nightly. Indications: Hypertension Quantity:  90 Tab Refills:  3  
     
   
   
   
  
 aspirin delayed-release 81 mg tablet Your last dose was: Your next dose is:    
   
   
 Dose:  81 mg Take 81 mg by mouth every morning. Indications: MYOCARDIAL INFARCTION PREVENTION, has not had since SCS trial 11/9/15 Refills:  0  
     
   
   
   
  
 budesonide-formoterol 160-4.5 mcg/actuation HFA inhaler Commonly known as:  SYMBICORT Your last dose was: Your next dose is:    
   
   
 Dose:  2 Puff Take 2 Puffs by inhalation two (2) times a day. Quantity:  1 Inhaler Refills:  11  
     
   
   
   
  
 calcium citrate-vitamin d3 315-200 mg-unit Tab Commonly known as:  CITRACAL+D Your last dose was: Your next dose is:    
   
   
 Dose:  2 Tab Take 2 Tabs by mouth two (2) times a day. Indications: POST-MENOPAUSAL OSTEOPOROSIS PREVENTION, crush pills with pill  Quantity:  120 Tab Refills:  11  
     
   
   
   
  
 cetirizine 10 mg tablet Commonly known as:  ZYRTEC Your last dose was: Your next dose is:    
   
   
 Dose:  10 mg Take 10 mg by mouth daily. Refills:  0  
     
   
   
   
  
 DALIRESP Tab tablet Generic drug:  roflumilast  
   
Your last dose was: Your next dose is:    
   
   
 Dose:  500 mcg Take 500 mcg by mouth daily. Refills:  0 DULoxetine 60 mg capsule Commonly known as:  CYMBALTA Your last dose was: Your next dose is:    
   
   
 Dose:  60 mg Take 1 Cap by mouth two (2) times a day. Indications: ANXIETY WITH DEPRESSION, CHRONIC MUSCULOSKELETAL PAIN Quantity:  180 Cap Refills:  3  
     
   
   
   
  
 levothyroxine 100 mcg tablet Commonly known as:  SYNTHROID Your last dose was: Your next dose is: Dose:  100 mcg Take 1 Tab by mouth Daily (before breakfast). Indications: take dos Quantity:  90 Tab Refills:  3  
     
   
   
   
  
 montelukast 10 mg tablet Commonly known as:  SINGULAIR Your last dose was: Your next dose is:    
   
   
 Dose:  10 mg Take 1 Tab by mouth nightly. Indications: ASTHMA PREVENTION Quantity:  90 Tab Refills:  3  
     
   
   
   
  
 omeprazole 40 mg capsule Commonly known as:  PRILOSEC Your last dose was: Your next dose is:    
   
   
 Dose:  40 mg Take 1 Cap by mouth daily. Take one hour before supper on an empty stomach Quantity:  90 Cap Refills:  3 PROBIOTIC 4X 10-15 mg Tbec Generic drug:  B.infantis-B.ani-B.long-B.bifi Your last dose was: Your next dose is:    
   
   
 Dose:  1 Tab Take 1 Tab by mouth daily (with lunch). Refills:  0 PROVENTIL HFA 90 mcg/actuation inhaler Generic drug:  albuterol Your last dose was: Your next dose is:    
   
   
 Dose:  2 Puff Take 2 Puffs by inhalation every four (4) hours as needed for Wheezing. Refills:  0 Where to Get Your Medications These medications were sent to [address] [address] Phone:  [phone]  
  amoxicillin-clavulanate 875-125 mg per tablet Information on where to get these meds will be given to you by the nurse or doctor. ! Ask your nurse or doctor about these medications  
  predniSONE 10 mg tablet Discharge Instructions Bronchitis: Care Instructions Your Care Instructions Bronchitis is inflammation of the bronchial tubes, which carry air to the lungs. The tubes swell and produce mucus, or phlegm. The mucus and inflamed bronchial tubes make you cough. You may have trouble breathing. Most cases of bronchitis are caused by viruses like those that cause colds. Antibiotics usually do not help and they may be harmful. Bronchitis usually develops rapidly and lasts about 2 to 3 weeks in otherwise healthy people. Follow-up care is a key part of your treatment and safety. Be sure to make and go to all appointments, and call your doctor if you are having problems. It's also a good idea to know your test results and keep a list of the medicines you take. How can you care for yourself at home? · Take all medicines exactly as prescribed. Call your doctor if you think you are having a problem with your medicine. · Get some extra rest. 
· Take an over-the-counter pain medicine, such as acetaminophen (Tylenol), ibuprofen (Advil, Motrin), or naproxen (Aleve) to reduce fever and relieve body aches. Read and follow all instructions on the label. · Do not take two or more pain medicines at the same time unless the doctor told you to. Many pain medicines have acetaminophen, which is Tylenol. Too much acetaminophen (Tylenol) can be harmful. · Take an over-the-counter cough medicine that contains dextromethorphan to help quiet a dry, hacking cough so that you can sleep. Avoid cough medicines that have more than one active ingredient. Read and follow all instructions on the label. · Breathe moist air from a humidifier, hot shower, or sink filled with hot water. The heat and moisture will thin mucus so you can cough it out. · Do not smoke. Smoking can make bronchitis worse. If you need help quitting, talk to your doctor about stop-smoking programs and medicines. These can increase your chances of quitting for good. When should you call for help? Call 911 anytime you think you may need emergency care. For example, call if: 
· You have severe trouble breathing. Call your doctor now or seek immediate medical care if: 
· You have new or worse trouble breathing. · You cough up dark brown or bloody mucus (sputum). · You have a new or higher fever. · You have a new rash. Watch closely for changes in your health, and be sure to contact your doctor if: 
· You cough more deeply or more often, especially if you notice more mucus or a change in the color of your mucus. · You are not getting better as expected. Where can you learn more? Go to http://vamsi-hardik.info/. Enter H333 in the search box to learn more about \"Bronchitis: Care Instructions. \" Current as of: May 23, 2016 Content Version: 11.2 © 1896-5990 Outbox. Care instructions adapted under license by [employer] (which disclaims liability or warranty for this information). If you have questions about a medical condition or this instruction, always ask your healthcare professional. [address] any warranty or liability for your use of this information. Chronic Obstructive Pulmonary Disease (COPD): Care Instructions Your Care Instructions Chronic obstructive pulmonary disease (COPD) is a general term for a group of lung diseases, including emphysema and chronic bronchitis. People with COPD have decreased airflow in and out of the lungs, which makes it hard to breathe. The airways also can get clogged with thick mucus. Cigarette smoking is a major cause of COPD. Although there is no cure for COPD, you can slow its progress. Following your treatment plan and taking care of yourself can help you feel better and live longer. Follow-up care is a key part of your treatment and safety. Be sure to make and go to all appointments, and call your doctor if you are having problems. It's also a good idea to know your test results and keep a list of the medicines you take. How can you care for yourself at home? Staying healthy · Do not smoke.  This is the most important step you can take to prevent more damage to your lungs. If you need help quitting, talk to your doctor about stop-smoking programs and medicines. These can increase your chances of quitting for good. · Avoid colds and flu. Get a pneumococcal vaccine shot. If you have had one before, ask your doctor whether you need a second dose. Get the flu vaccine every fall. If you must be around people with colds or the flu, wash your hands often. · Avoid secondhand smoke, air pollution, and high altitudes. Also avoid cold, dry air and hot, humid air. Stay at home with your windows closed when air pollution is bad. Medicines and oxygen therapy · Take your medicines exactly as prescribed. Call your doctor if you think you are having a problem with your medicine. · You may be taking medicines such as: ¨ Bronchodilators. These help open your airways and make breathing easier. Bronchodilators are either short-acting (work for 6 to 9 hours) or long-acting (work for 24 hours). You inhale most bronchodilators, so they start to act quickly. Always carry your quick-relief inhaler with you in case you need it while you are away from home. ¨ Corticosteroids (prednisone, budesonide). These reduce airway inflammation. They come in pill or inhaled form. You must take these medicines every day for them to work well. · A spacer may help you get more inhaled medicine to your lungs. Ask your doctor or pharmacist if a spacer is right for you. If it is, ask how to use it properly. · Do not take any vitamins, over-the-counter medicine, or herbal products without talking to your doctor first. 
· If your doctor prescribed antibiotics, take them as directed. Do not stop taking them just because you feel better. You need to take the full course of antibiotics. · Oxygen therapy boosts the amount of oxygen in your blood and helps you breathe easier. Use the flow rate your doctor has recommended, and do not change it without talking to your doctor first. 
Activity · Get regular exercise. Walking is an easy way to get exercise. Start out slowly, and walk a little more each day. · Pay attention to your breathing. You are exercising too hard if you cannot talk while you are exercising. · Take short rest breaks when doing household chores and other activities. · Learn breathing methodssuch as breathing through pursed lipsto help you become less short of breath. · If your doctor has not set you up with a pulmonary rehabilitation program, talk to him or her about whether rehab is right for you. Rehab includes exercise programs, education about your disease and how to manage it, help with diet and other changes, and emotional support. Diet · Eat regular, healthy meals. Use bronchodilators about 1 hour before you eat to make it easier to eat. Eat several small meals instead of three large ones. Drink beverages at the end of the meal. Avoid foods that are hard to chew. · Eat foods that contain protein so that you do not lose muscle mass. Mental health · Talk to your family, friends, or a therapist about your feelings. It is normal to feel frightened, angry, hopeless, helpless, and even guilty. Talking openly about bad feelings can help you cope. If these feelings last, talk to your doctor. When should you call for help? Call 911 anytime you think you may need emergency care. For example, call if: 
· You have severe trouble breathing. Call your doctor now or seek immediate medical care if: 
· You have new or worse trouble breathing. · You cough up blood. · You have a fever. Watch closely for changes in your health, and be sure to contact your doctor if: 
· You cough more deeply or more often, especially if you notice more mucus or a change in the color of your mucus. · You have new or worse swelling in your legs or belly. · You are not getting better as expected. Where can you learn more? Go to http://vamsi-hardik.info/. Melchor Salazar in the search box to learn more about \"Chronic Obstructive Pulmonary Disease (COPD): Care Instructions. \" Current as of: May 23, 2016 Content Version: 11.2 © 1512-5347 [employer]OM. Care instructions adapted under license by [employer] (which disclaims liability or warranty for this information). If you have questions about a medical condition or this instruction, always ask your healthcare professional. [place] [address] any warranty or liability for your use of this information. DISCHARGE SUMMARY from Nurse The following personal items are in your possession at time of discharge: 
 
Dental Appliances: None Home Medications: None Jewelry: None Clothing: Shirt, Pants, Footwear, Undergarments Other Valuables: None PATIENT INSTRUCTIONS: 
 
 
F-face looks uneven A-arms unable to move or move unevenly S-speech slurred or non-existent T-time-call 911 as soon as signs and symptoms begin-DO NOT go Back to bed or wait to see if you get better-TIME IS BRAIN. Warning Signs of HEART ATTACK Call 911 if you have these symptoms: 
? Chest discomfort. Most heart attacks involve discomfort in the center of the chest that lasts more than a few minutes, or that goes away and comes back. It can feel like uncomfortable pressure, squeezing, fullness, or pain. ? Discomfort in other areas of the upper body. Symptoms can include pain or discomfort in one or both arms, the back, neck, jaw, or stomach. ? Shortness of breath with or without chest discomfort. ? Other signs may include breaking out in a cold sweat, nausea, or lightheadedness. Don't wait more than five minutes to call [address]! Fast action can save your life.  Calling 911 is almost always the fastest way to get lifesaving treatment. Emergency Medical Services staff can begin treatment when they arrive  up to an hour sooner than if someone gets to the hospital by car. The discharge information has been reviewed with the patient. The patient verbalized understanding. Discharge medications reviewed with the patient and appropriate educational materials and side effects teaching were provided. Discharge Orders None Trusteerhart Announcement We are excited to announce that we are making your provider's discharge notes available to you in Rizzoma. You will see these notes when they are completed and signed by the physician that discharged you from your recent hospital stay. If you have any questions or concerns about any information you see in TrusteerharMetooo, please call the Health Information Department where you were seen or reach out to your Primary Care Provider for more information about your plan of care. Introducing [facility]! Jason Lieberman introduces Rizzoma patient portal. Now you can access parts of your medical record, email your doctor's office, and request medication refills online. 1. In your internet browser, go to [email or web address] 2. Click on the First Time User? Click Here link in the Sign In box. You will see the New Member Sign Up page. 3. Enter your Rizzoma Access Code exactly as it appears below. You will not need to use this code after youve completed the sign-up process. If you do not sign up before the expiration date, you must request a new code. · Rizzoma Access Code: [email or web address] Expires: 6/5/2017  1:43 PM 
 
4. Enter the last four digits of your Social Security Number (xxxx) and Date of Birth (mm/dd/yyyy) as indicated and click Submit. You will be taken to the next sign-up page. 5. Create a Rizzoma ID. This will be your Rizzoma login ID and cannot be changed, so think of one that is secure and easy to remember. 6. Create a Richmedia password. You can change your password at any time. 7. Enter your Password Reset Question and Answer. This can be used at a later time if you forget your password. 8. Enter your e-mail address. You will receive e-mail notification when new information is available in [address] 9. Click Sign Up. You can now view and download portions of your medical record. 10. Click the Download Summary menu link to download a portable copy of your medical information. If you have questions, please visit the Frequently Asked Questions section of the Richmedia website. Remember, Richmedia is NOT to be used for urgent needs. For medical emergencies, dial 911. Now available from your iPhone and Android! General Information Please provide this summary of care documentation to your next provider. Patient Signature:  ____________________________________________________________ Date:  ____________________________________________________________  
  
Baron Dawsonbs Provider Signature:  ____________________________________________________________ Date:  ____________________________________________________________

## 2017-05-24 NOTE — ED PROVIDER NOTES
HPI Comments: Patient is a 78-year-old female who is coming in with one week of cough that has been productive. She's also had some congestion and generalized fatigue. She has a history of COPD and states she is not on home oxygen and has been using her inhalers at home. Her PCP also called in a antibiotic and she's taken a course of azithromycin but it has not improved her symptoms. Patient is a 65 y.o. female presenting with cough and fatigue. The history is provided by the patient. Cough   Associated symptoms include shortness of breath and wheezing. Pertinent negatives include no chest pain, no nausea and no vomiting. Fatigue   Associated symptoms include shortness of breath. Pertinent negatives include no chest pain, no vomiting and no nausea. Past Medical History:   Diagnosis Date    Abnormal chest CT 1/13/2015    Impression:  1. No evidence of pulmonary embolism. 2. Bilateral lung opacities (most prominent in the lower lobes) are nonspecific  but likely infectious or inflammatory, possibly aspiration related. 3. Emphysema. 4. Tiny indeterminate lung nodules for which followup CT is recommended in 6  months. 5. Atherosclerotic disease.  Allergic rhinitis     Anxiety 1/12/2015    Arthritis     Asthma     Bronchiectasis ([address]     CAD (coronary artery disease) 06/17/2015    CT of chest March 2017 showed coronary calcification also    Carotid artery stenosis without cerebral infarction 9/28/2015    1. Bilateral CEA in 2014.  Carotid stenosis, right 4/8/2014 4/14/14 (Dr. Gayathri Slade) Right carotid endarterectomy with bovine pericardium  patch.       Cholelithiasis     Chronic back pain     Chronic pain syndrome 11/25/2015    Chronic renal insufficiency     Chronic restrictive lung disease     Congestive heart failure (CHF) (HCC)     COPD (chronic obstructive pulmonary disease) (HCC)     Decreased vision     Depression     and anxiety     Diabetes mellitus, type II ([facility] [address] [facility]     Diarrhea     Diverticulitis     Dizziness     Dry eyes     Dyspnea/shortness of breath 9/28/2015    GERD (gastroesophageal reflux disease)     not well controlled    Glaucoma     History of pneumonia 1/2015    Hyperkalemia     Hyperlipidemia      Hypertension     Hyposmolality and/or hyponatremia     Hypothyroidism     Leg pain     Macular degeneration     Menopausal disorder     Neuropathy     LUIS FERNANDO on CPAP 4/9/2015    Proteinuria     Pruritic disorder     PVD (peripheral vascular disease) (HCC)     SOB (shortness of breath) on exertion     Spinal stenosis     Trigger thumb     Unspecified asthma     inhalers as needed    Unspecified sleep apnea     cpap non- compliant at times    Venous stasis        Past Surgical History:   Procedure Laterality Date    HX APPENDECTOMY      HX BACK SURGERY  11/9/15    SCS TRIAL    HX CAROTID ENDARTERECTOMY Left 1/14    HX CAROTID ENDARTERECTOMY Right 4/14    HX CATARACT REMOVAL Bilateral     2014 - Right first; left second    HX LUMBAR FUSION      HX MYRINGOTOMY Right     x2    HX OTHER SURGICAL  02/14/2000    back synovial cyst    HX HERNÁN AND BSO  1978    pain and bleeding         Family History:   Problem Relation Age of Onset    Diabetes Brother     Cancer Mother     Breast Cancer Mother     Heart Disease Father     Heart Attack Father     Cancer Sister      uterine       Social History     Social History    Marital status:      Spouse name: N/A    Number of children: N/A    Years of education: N/A     Occupational History    Not on file. Social History Main Topics    Smoking status: Former Smoker     Packs/day: 1.00     Years: 30.00    Smokeless tobacco: Never Used      Comment: quit 2011    Alcohol use No    Drug use: No    Sexual activity: Not on file     Other Topics Concern    Not on file     Social History Narrative    Lives alone, granddaughter lives next door.  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 ALLERGIES: Sulfa (sulfonamide antibiotics); Abilify [aripiprazole]; Chantix [varenicline]; Desyrel [trazodone]; Lipitor [atorvastatin]; Lisinopril; Nolamine; Other medication; and Zocor [simvastatin]    Review of Systems   Constitutional: Positive for activity change and fatigue. Negative for appetite change. Respiratory: Positive for cough, shortness of breath and wheezing. Negative for chest tightness. Cardiovascular: Negative for chest pain and palpitations. Gastrointestinal: Negative for abdominal pain, diarrhea, nausea and vomiting. Skin: Negative. Vitals:    05/24/17 1302 05/24/17 1440 05/24/17 1520   BP: 105/51 134/60 115/55   Pulse: (!) 103 95 94   Resp: 18     Temp: 97.7 °F (36.5 °C)     SpO2: 97% 99% 95%   Weight: 69.9 kg (154 lb)     Height: 5' 2\" (1.575 m)              Physical Exam   Constitutional: She is oriented to person, place, and time. She appears well-developed and well-nourished. No distress. HENT:   Head: Normocephalic and atraumatic. Eyes: Conjunctivae are normal. No scleral icterus. Neck: Normal range of motion. Neck supple. Cardiovascular: Normal rate, regular rhythm and normal heart sounds. Pulmonary/Chest: No stridor. No respiratory distress. She has wheezes. She has no rales. She exhibits no tenderness. tachypena     Abdominal: Soft. She exhibits no distension. There is no tenderness. There is no rebound and no guarding. Neurological: She is alert and oriented to person, place, and time. No focal weakness   Skin: Skin is warm and dry. No rash noted. She is not diaphoretic. No erythema. Psychiatric: She has a normal mood and affect. Her behavior is normal.   Nursing note and vitals reviewed.        MDM  Number of Diagnoses or Management Options  Acute exacerbation of chronic obstructive pulmonary disease (COPD) [facility]):   Diagnosis management comments: Patient has clinical symptoms of a pneumonia with productive cough while I'm in the room and white blood cell count of 18,000 she was empirically treated for this. She also was still working somewhat hard to breathe because I spoke with pulmonary to evaluate for admission for further care. Yaneth Keane MD; 5/24/2017 @10:04 PM Voice dictation software was used during the making of this note. This software is not perfect and grammatical and other typographical errors may be present.   This note has not been proofread for errors.  ===================================================================        Amount and/or Complexity of Data Reviewed  Clinical lab tests: ordered and reviewed (Results for orders placed or performed during the hospital encounter of 05/24/17  -CBC WITH AUTOMATED DIFF       Result                                            Value                         Ref Range                       WBC                                               18.6 (H)                      4.3 - 11.1 K/uL                 RBC                                               4.10                          4.05 - 5.25 M/uL                HGB                                               12.7                          11.7 - 15.4 g/dL                HCT                                               36.6                          35.8 - 46.3 %                   MCV                                               89.3                          79.6 - 97.8 FL                  MCH                                               31.0                          26.1 - 32.9 PG                  MCHC                                              34.7                          31.4 - 35.0 g/dL                RDW                                               13.1                          11.9 - 14.6 %                   PLATELET                                          279                           150 - 450 K/uL                  MPV                                               9.7 (L) 10.8 - 14.1 FL                  DF                                                AUTOMATED                                                     NEUTROPHILS                                       80 (H)                        43 - 78 %                       LYMPHOCYTES                                       13                            13 - 44 %                       MONOCYTES                                         7                             4.0 - 12.0 %                    EOSINOPHILS                                       0 (L)                         0.5 - 7.8 %                     BASOPHILS                                         0                             0.0 - 2.0 %                     IMMATURE GRANULOCYTES                             0.3                           0.0 - 5.0 %                     ABS. NEUTROPHILS                                  14.8 (H)                      1.7 - 8.2 K/UL                  ABS. LYMPHOCYTES                                  2.4                           0.5 - 4.6 K/UL                  ABS. MONOCYTES                                    1.2                           0.1 - 1.3 K/UL                  ABS. EOSINOPHILS                                  0.0                           0.0 - 0.8 K/UL                  ABS. BASOPHILS                                    0.0                           0.0 - 0.2 K/UL                  ABS. IMM.  GRANS.                                  0.1                           0.0 - 0.5 K/UL             -METABOLIC PANEL, COMPREHENSIVE       Result                                            Value                         Ref Range                       Sodium                                            135 (L)                       136 - 145 mmol/L                Potassium                                         4.4                           3.5 - 5.1 mmol/L                Chloride                                          99                            98 - 107 mmol/L                 CO2                                               23                            21 - 32 mmol/L                  Anion gap                                         13                            7 - 16 mmol/L                   Glucose                                           144 (H)                       65 - 100 mg/dL                  BUN                                               19                            8 - 23 MG/DL                    Creatinine                                        1.01 (H)                      0.6 - 1.0 MG/DL                 GFR est AA                                        >60                           >60 ml/min/1.73m2               GFR est non-AA                                    56 (L)                        >60 ml/min/1.73m2               Calcium                                           9.3                           8.3 - 10.4 MG/DL                Bilirubin, total                                  0.6                           0.2 - 1.1 MG/DL                 ALT (SGPT)                                        13                            12 - 65 U/L                     AST (SGOT)                                        10 (L)                        15 - 37 U/L                     Alk. phosphatase                                  142 (H)                       50 - 136 U/L                    Protein, total                                    7.6                           6.3 - 8.2 g/dL                  Albumin                                           3.2                           3.2 - 4.6 g/dL                  Globulin                                          4.4 (H)                       2.3 - 3.5 g/dL                  A-G Ratio                                         0.7 (L)                       1.2 - 3.5                  -URINE MICROSCOPIC       Result                                            Value                         Ref Range WBC                                               3-5                           0 /hpf                          RBC                                               0-3                           0 /hpf                          Epithelial cells                                  0                             0 /hpf                          Bacteria                                          TRACE                         0 /hpf                          Casts                                             3-5                           0 /lpf                          Crystals, urine                                   0                             0 /LPF                          Mucus                                             0                             0 /lpf                     -POC LACTIC ACID       Result                                            Value                         Ref Range                       Lactic Acid (POC)                                 1.7                           0.5 - 1.9 mmol/L          )  Tests in the radiology section of CPT®: reviewed and ordered (Xr Chest Pa Lat    Result Date: 5/24/2017  PA AND LATERAL CHEST X-RAY  5/24/2017 HISTORY:  Cough and congestion COMPARISON:  7/11/2016 FINDINGS:  PA and lateral views demonstrate hyperexpanded lungs. Interstitial basilar lung findings appear chronic. No definite acute infiltrate. Cardiac shadow and aortic shadow unchanged. Mild thoracic kyphosis without focal compression fracture. Dorsal column stimulator device again noted.      IMPRESSION:  No acute findings or changes in the chest.     )      ED Course       Procedures

## 2017-05-25 LAB
ANION GAP BLD CALC-SCNC: 10 MMOL/L (ref 7–16)
BACTERIA SPEC CULT: NORMAL
BUN SERPL-MCNC: 23 MG/DL (ref 8–23)
CALCIUM SERPL-MCNC: 9.5 MG/DL (ref 8.3–10.4)
CHLORIDE SERPL-SCNC: 99 MMOL/L (ref 98–107)
CO2 SERPL-SCNC: 23 MMOL/L (ref 21–32)
CREAT SERPL-MCNC: 0.9 MG/DL (ref 0.6–1)
ERYTHROCYTE [DISTWIDTH] IN BLOOD BY AUTOMATED COUNT: 13.1 % (ref 11.9–14.6)
FLUAV AG NPH QL IA: NEGATIVE
FLUBV AG NPH QL IA: NEGATIVE
GLUCOSE BLD STRIP.AUTO-MCNC: 171 MG/DL (ref 65–100)
GLUCOSE BLD STRIP.AUTO-MCNC: 234 MG/DL (ref 65–100)
GLUCOSE BLD STRIP.AUTO-MCNC: 242 MG/DL (ref 65–100)
GLUCOSE BLD STRIP.AUTO-MCNC: 254 MG/DL (ref 65–100)
GLUCOSE SERPL-MCNC: 208 MG/DL (ref 65–100)
HCT VFR BLD AUTO: 35.4 % (ref 35.8–46.3)
HGB BLD-MCNC: 12.2 G/DL (ref 11.7–15.4)
MCH RBC QN AUTO: 30.3 PG (ref 26.1–32.9)
MCHC RBC AUTO-ENTMCNC: 34.5 G/DL (ref 31.4–35)
MCV RBC AUTO: 88.1 FL (ref 79.6–97.8)
PLATELET # BLD AUTO: 280 K/UL (ref 150–450)
PMV BLD AUTO: 9.6 FL (ref 10.8–14.1)
POTASSIUM SERPL-SCNC: 4.7 MMOL/L (ref 3.5–5.1)
RBC # BLD AUTO: 4.02 M/UL (ref 4.05–5.25)
SERVICE CMNT-IMP: NORMAL
SODIUM SERPL-SCNC: 132 MMOL/L (ref 136–145)
WBC # BLD AUTO: 8.8 K/UL (ref 4.3–11.1)

## 2017-05-25 PROCEDURE — 74011250637 HC RX REV CODE- 250/637: Performed by: INTERNAL MEDICINE

## 2017-05-25 PROCEDURE — 74011000258 HC RX REV CODE- 258: Performed by: INTERNAL MEDICINE

## 2017-05-25 PROCEDURE — 77030018836 HC SOL IRR NACL ICUM -A

## 2017-05-25 PROCEDURE — 74011636637 HC RX REV CODE- 636/637: Performed by: INTERNAL MEDICINE

## 2017-05-25 PROCEDURE — 74011000250 HC RX REV CODE- 250: Performed by: INTERNAL MEDICINE

## 2017-05-25 PROCEDURE — 74011250636 HC RX REV CODE- 250/636: Performed by: PHYSICIAN ASSISTANT

## 2017-05-25 PROCEDURE — 99232 SBSQ HOSP IP/OBS MODERATE 35: CPT | Performed by: INTERNAL MEDICINE

## 2017-05-25 PROCEDURE — 94760 N-INVAS EAR/PLS OXIMETRY 1: CPT

## 2017-05-25 PROCEDURE — 82962 GLUCOSE BLOOD TEST: CPT

## 2017-05-25 PROCEDURE — 85027 COMPLETE CBC AUTOMATED: CPT | Performed by: INTERNAL MEDICINE

## 2017-05-25 PROCEDURE — 87070 CULTURE OTHR SPECIMN AEROBIC: CPT | Performed by: INTERNAL MEDICINE

## 2017-05-25 PROCEDURE — 65270000029 HC RM PRIVATE

## 2017-05-25 PROCEDURE — 77030013140 HC MSK NEB VYRM -A

## 2017-05-25 PROCEDURE — 80048 BASIC METABOLIC PNL TOTAL CA: CPT | Performed by: INTERNAL MEDICINE

## 2017-05-25 PROCEDURE — 94640 AIRWAY INHALATION TREATMENT: CPT

## 2017-05-25 PROCEDURE — 87641 MR-STAPH DNA AMP PROBE: CPT | Performed by: INTERNAL MEDICINE

## 2017-05-25 PROCEDURE — 74011250636 HC RX REV CODE- 250/636: Performed by: INTERNAL MEDICINE

## 2017-05-25 RX ORDER — ALBUTEROL SULFATE 90 UG/1
2 AEROSOL, METERED RESPIRATORY (INHALATION)
COMMUNITY
End: 2020-11-09 | Stop reason: SDUPTHER

## 2017-05-25 RX ADMIN — DULOXETINE HYDROCHLORIDE 60 MG: 60 CAPSULE, DELAYED RELEASE ORAL at 10:16

## 2017-05-25 RX ADMIN — INSULIN LISPRO 2 UNITS: 100 INJECTION, SOLUTION INTRAVENOUS; SUBCUTANEOUS at 01:30

## 2017-05-25 RX ADMIN — MONTELUKAST SODIUM 10 MG: 10 TABLET, FILM COATED ORAL at 21:15

## 2017-05-25 RX ADMIN — MONTELUKAST SODIUM 10 MG: 10 TABLET, FILM COATED ORAL at 01:19

## 2017-05-25 RX ADMIN — DOXYCYCLINE 100 MG: 100 INJECTION, POWDER, LYOPHILIZED, FOR SOLUTION INTRAVENOUS at 06:52

## 2017-05-25 RX ADMIN — Medication 10 ML: at 16:39

## 2017-05-25 RX ADMIN — DOXYCYCLINE 100 MG: 100 INJECTION, POWDER, LYOPHILIZED, FOR SOLUTION INTRAVENOUS at 17:35

## 2017-05-25 RX ADMIN — METHYLPREDNISOLONE SODIUM SUCCINATE 60 MG: 125 INJECTION, POWDER, FOR SOLUTION INTRAMUSCULAR; INTRAVENOUS at 11:48

## 2017-05-25 RX ADMIN — AMLODIPINE BESYLATE 5 MG: 10 TABLET ORAL at 21:06

## 2017-05-25 RX ADMIN — ALBUTEROL SULFATE 2.5 MG: 2.5 SOLUTION RESPIRATORY (INHALATION) at 19:14

## 2017-05-25 RX ADMIN — INSULIN LISPRO 2 UNITS: 100 INJECTION, SOLUTION INTRAVENOUS; SUBCUTANEOUS at 21:06

## 2017-05-25 RX ADMIN — DULOXETINE HYDROCHLORIDE 60 MG: 60 CAPSULE, DELAYED RELEASE ORAL at 01:20

## 2017-05-25 RX ADMIN — PANTOPRAZOLE SODIUM 40 MG: 40 TABLET, DELAYED RELEASE ORAL at 10:16

## 2017-05-25 RX ADMIN — Medication 10 ML: at 11:49

## 2017-05-25 RX ADMIN — CEFTRIAXONE 1 G: 1 INJECTION, POWDER, FOR SOLUTION INTRAMUSCULAR; INTRAVENOUS at 16:38

## 2017-05-25 RX ADMIN — ASPIRIN 81 MG: 81 TABLET, COATED ORAL at 11:47

## 2017-05-25 RX ADMIN — ENOXAPARIN SODIUM 40 MG: 40 INJECTION SUBCUTANEOUS at 23:19

## 2017-05-25 RX ADMIN — ALBUTEROL SULFATE 2.5 MG: 2.5 SOLUTION RESPIRATORY (INHALATION) at 11:30

## 2017-05-25 RX ADMIN — ALPRAZOLAM 0.25 MG: 0.25 TABLET ORAL at 01:30

## 2017-05-25 RX ADMIN — BUDESONIDE 500 MCG: 0.5 SUSPENSION RESPIRATORY (INHALATION) at 07:40

## 2017-05-25 RX ADMIN — METHYLPREDNISOLONE SODIUM SUCCINATE 40 MG: 125 INJECTION, POWDER, FOR SOLUTION INTRAMUSCULAR; INTRAVENOUS at 21:06

## 2017-05-25 RX ADMIN — Medication 10 ML: at 21:12

## 2017-05-25 RX ADMIN — METHYLPREDNISOLONE SODIUM SUCCINATE 60 MG: 125 INJECTION, POWDER, FOR SOLUTION INTRAMUSCULAR; INTRAVENOUS at 01:19

## 2017-05-25 RX ADMIN — ENOXAPARIN SODIUM 40 MG: 40 INJECTION SUBCUTANEOUS at 01:21

## 2017-05-25 RX ADMIN — ALBUTEROL SULFATE 2.5 MG: 2.5 SOLUTION RESPIRATORY (INHALATION) at 07:40

## 2017-05-25 RX ADMIN — DULOXETINE HYDROCHLORIDE 60 MG: 60 CAPSULE, DELAYED RELEASE ORAL at 17:34

## 2017-05-25 RX ADMIN — INSULIN LISPRO 3 UNITS: 100 INJECTION, SOLUTION INTRAVENOUS; SUBCUTANEOUS at 17:34

## 2017-05-25 RX ADMIN — BUDESONIDE 500 MCG: 0.5 SUSPENSION RESPIRATORY (INHALATION) at 19:14

## 2017-05-25 RX ADMIN — ALBUTEROL SULFATE 2.5 MG: 2.5 SOLUTION RESPIRATORY (INHALATION) at 15:07

## 2017-05-25 RX ADMIN — INSULIN LISPRO 1 UNITS: 100 INJECTION, SOLUTION INTRAVENOUS; SUBCUTANEOUS at 10:16

## 2017-05-25 RX ADMIN — LEVOTHYROXINE SODIUM 100 MCG: 100 TABLET ORAL at 10:16

## 2017-05-25 RX ADMIN — LORATADINE 10 MG: 10 TABLET ORAL at 11:54

## 2017-05-25 NOTE — PROGRESS NOTES
Jayden Galdamez  Admission Date: 5/24/2017             Daily Progress Note: 5/25/2017    The patient's chart is reviewed and the patient is discussed with the staff. Pt is a 64 yo  female with a history of COPD, LUIS FERNANDO on CPAP, HTN, PAH, pulmonary nodule, and chronic pain. Pt presented to the ER on 5/24 with wheezing, cough with purulent sputum, and myalgias. Pt was admitted for COPD exacerbation. Subjective:     Pt lying in bed on RA. Pt coughing up yellow sputum. Pt still has weakness and myalgias.      Current Facility-Administered Medications   Medication Dose Route Frequency    doxycycline (VIBRAMYCIN) 100 mg in 0.9% sodium chloride (MBP/ADV) 100 mL  100 mg IntraVENous Q12H    ALPRAZolam (XANAX) tablet 0.25 mg  0.25 mg Oral TID PRN    amLODIPine (NORVASC) tablet 5 mg  5 mg Oral QHS    aspirin delayed-release tablet 81 mg  81 mg Oral 7am    carisoprodol (SOMA) tablet 175 mg  175 mg Oral QHS    loratadine (CLARITIN) tablet 10 mg  10 mg Oral DAILY    DULoxetine (CYMBALTA) capsule 60 mg  60 mg Oral BID    levothyroxine (SYNTHROID) tablet 100 mcg  100 mcg Oral ACB    montelukast (SINGULAIR) tablet 10 mg  10 mg Oral QHS    pantoprazole (PROTONIX) tablet 40 mg  40 mg Oral ACB    roflumilast (DALIRESP) tablet 500 mcg - PATIENT SUPPLIED  500 mcg Oral DAILY    sodium chloride (NS) flush 5-10 mL  5-10 mL IntraVENous Q8H    sodium chloride (NS) flush 5-10 mL  5-10 mL IntraVENous PRN    albuterol (PROVENTIL VENTOLIN) nebulizer solution 2.5 mg  2.5 mg Nebulization QID RT    methylPREDNISolone (PF) (SOLU-MEDROL) injection 60 mg  60 mg IntraVENous Q6H    budesonide (PULMICORT) 500 mcg/2 ml nebulizer suspension  500 mcg Nebulization BID RT    enoxaparin (LOVENOX) injection 40 mg  40 mg SubCUTAneous Q24H    HYDROcodone-homatropine (HYCODAN) tablet 1 Tab  1 Tab Oral Q4H PRN    insulin lispro (HUMALOG) injection   SubCUTAneous AC&HS    cefTRIAXone (ROCEPHIN) 1 g in 0.9% sodium chloride (MBP/ADV) 50 mL  1 g IntraVENous Q24H       Review of Systems  +cough  +weakness  +fatigue  Constitutional: negative for fever, chills, sweats  Cardiovascular: negative for chest pain, palpitations, syncope, edema  Gastrointestinal:  negative for dysphagia, reflux, vomiting, diarrhea, abdominal pain, or melena  Neurologic:  negative for numbness, headache    Objective:     Vitals:    05/25/17 0900 05/25/17 1000 05/25/17 1018 05/25/17 1130   BP: 110/53 116/57  115/55   Pulse: 94 94 89 86   Resp:    17   Temp:    97.6 °F (36.4 °C)   SpO2: 90% 93% 94% 92%   Weight:       Height:         Intake and Output:           Physical Exam:   Constitution:  the patient is well developed and in no acute distress, on RA  EENMT:  Sclera clear, pupils equal, oral mucosa moist  Respiratory: faint insp wheezing  Cardiovascular:  RRR without M,G,R  Gastrointestinal: soft and non-tender; with positive bowel sounds. Musculoskeletal: warm without cyanosis. There is no lower leg edema. Skin:  no jaundice or rashes   Neurologic: no gross neuro deficits     Psychiatric:  alert and oriented x 3    CHEST XRAY:           LAB  Recent Labs      05/25/17   0827  05/25/17   0124   GLUCPOC  171*  242*      Recent Labs      05/25/17   0428  05/24/17   1305   WBC  8.8  18.6*   HGB  12.2  12.7   HCT  35.4*  36.6   PLT  280  279     Recent Labs      05/25/17   0428  05/24/17   1305   NA  132*  135*   K  4.7  4.4   CL  99  99   CO2  23  23   GLU  208*  144*   BUN  23  19   CREA  0.90  1.01*   CA  9.5  9.3   ALB   --   3.2   TBILI   --   0.6   ALT   --   13   SGOT   --   10*     No results for input(s): PH, PCO2, PO2, HCO3 in the last 72 hours. No results for input(s): LCAD, LAC in the last 72 hours.       Assessment:  (Medical Decision Making)     Hospital Problems  Date Reviewed: 5/25/2017          Codes Class Noted POA    Bronchitis-on Rocephin/Doxycycline ICD-10-CM: J40  ICD-9-CM: 451  5/24/2017 Unknown        Acute bacterial sinusitis-continue abx ICD-10-CM: J01.90, B96.89  ICD-9-CM: 461.9  5/24/2017 Unknown        * (Principal)COPD exacerbation (HCC)-continue abx, solumedrol ICD-10-CM: J44.1  ICD-9-CM: 491.21  1/9/2015 Yes              Plan:  (Medical Decision Making)     --on RA  --Rocephin/Doxycycline  --on solumedrol, wean dose  --continue nebs  --continue daliresp  --hopefully home soon    More than 50% of the time documented was spent in face-to-face contact with the patient and in the care of the patient on the floor/unit where the patient is located. SEAN Cohen      Lungs:  Still has some wheezen  Heart:  RRR with no Murmur/Rubs/Gallops    Additional Comments:  Continue iv solumdrol ,  Albuterol  Rxs. rocephine    I have spoken with and examined the patient. I agree with the above assessment and plan as documented.     Janell Begum MD

## 2017-05-25 NOTE — ED NOTES
Verbal report received from [facility], [address] [facility]. Assumed pt care at this time.

## 2017-05-25 NOTE — PROGRESS NOTES
TRANSFER - IN REPORT:    Verbal [facility] for routine progression of care      Report consisted of patients Situation, Background, Assessment and   Recommendations(SBAR). Information from the following report(s) SBAR, ED Summary, [facility] and Recent Results was reviewed with the receiving nurse. Opportunity for questions and clarification was provided. Assessment completed upon patients arrival to unit and care assumed.

## 2017-05-25 NOTE — PROGRESS NOTES
Patient admitted to room 622. Daughter at bedside. Patient's skin intact with scabs where patient states she has picked on luca arms, legs and upper back.   Respiratory aware of patients arrival.

## 2017-05-25 NOTE — ED NOTES
TRANSFER - OUT REPORT:    Verbal report given on Seth Ross  being transferred to [facility](unit) for routine progression of care       Report consisted of patients Situation, Background, Assessment and   Recommendations(SBAR). Information from the following report(s) SBAR, ED Summary, [facility] and Recent Results was reviewed with the receiving nurse. Lines:   Peripheral IV 05/24/17 Left Antecubital (Active)        Opportunity for questions and clarification was provided.       Patient transported with:   [employer]

## 2017-05-26 LAB
ERYTHROCYTE [DISTWIDTH] IN BLOOD BY AUTOMATED COUNT: 13 % (ref 11.9–14.6)
GLUCOSE BLD STRIP.AUTO-MCNC: 152 MG/DL (ref 65–100)
GLUCOSE BLD STRIP.AUTO-MCNC: 203 MG/DL (ref 65–100)
GLUCOSE BLD STRIP.AUTO-MCNC: 210 MG/DL (ref 65–100)
GLUCOSE BLD STRIP.AUTO-MCNC: 265 MG/DL (ref 65–100)
HCT VFR BLD AUTO: 33.3 % (ref 35.8–46.3)
HGB BLD-MCNC: 11.8 G/DL (ref 11.7–15.4)
MCH RBC QN AUTO: 30.7 PG (ref 26.1–32.9)
MCHC RBC AUTO-ENTMCNC: 35.4 G/DL (ref 31.4–35)
MCV RBC AUTO: 86.7 FL (ref 79.6–97.8)
PLATELET # BLD AUTO: 339 K/UL (ref 150–450)
PMV BLD AUTO: 9.9 FL (ref 10.8–14.1)
RBC # BLD AUTO: 3.84 M/UL (ref 4.05–5.25)
WBC # BLD AUTO: 12.1 K/UL (ref 4.3–11.1)

## 2017-05-26 PROCEDURE — 77030020120 HC VLV RESP PEP HI -B

## 2017-05-26 PROCEDURE — 65270000029 HC RM PRIVATE

## 2017-05-26 PROCEDURE — 77030012341 HC CHMB SPCR OPTC MDI VYRM -A

## 2017-05-26 PROCEDURE — 74011250636 HC RX REV CODE- 250/636: Performed by: INTERNAL MEDICINE

## 2017-05-26 PROCEDURE — 82962 GLUCOSE BLOOD TEST: CPT

## 2017-05-26 PROCEDURE — 74011000258 HC RX REV CODE- 258: Performed by: INTERNAL MEDICINE

## 2017-05-26 PROCEDURE — 74011636637 HC RX REV CODE- 636/637: Performed by: INTERNAL MEDICINE

## 2017-05-26 PROCEDURE — 36415 COLL VENOUS BLD VENIPUNCTURE: CPT | Performed by: INTERNAL MEDICINE

## 2017-05-26 PROCEDURE — 74011250636 HC RX REV CODE- 250/636: Performed by: PHYSICIAN ASSISTANT

## 2017-05-26 PROCEDURE — 74011250637 HC RX REV CODE- 250/637: Performed by: INTERNAL MEDICINE

## 2017-05-26 PROCEDURE — 74011000250 HC RX REV CODE- 250: Performed by: INTERNAL MEDICINE

## 2017-05-26 PROCEDURE — 94760 N-INVAS EAR/PLS OXIMETRY 1: CPT

## 2017-05-26 PROCEDURE — 99232 SBSQ HOSP IP/OBS MODERATE 35: CPT | Performed by: INTERNAL MEDICINE

## 2017-05-26 PROCEDURE — 94640 AIRWAY INHALATION TREATMENT: CPT

## 2017-05-26 PROCEDURE — 85027 COMPLETE CBC AUTOMATED: CPT | Performed by: INTERNAL MEDICINE

## 2017-05-26 RX ORDER — ZOLPIDEM TARTRATE 5 MG/1
5 TABLET ORAL
Status: DISCONTINUED | OUTPATIENT
Start: 2017-05-26 | End: 2017-05-27 | Stop reason: HOSPADM

## 2017-05-26 RX ADMIN — DULOXETINE HYDROCHLORIDE 60 MG: 60 CAPSULE, DELAYED RELEASE ORAL at 08:52

## 2017-05-26 RX ADMIN — Medication 10 ML: at 21:20

## 2017-05-26 RX ADMIN — CARISOPRODOL 175 MG: 350 TABLET ORAL at 21:19

## 2017-05-26 RX ADMIN — Medication 10 ML: at 06:00

## 2017-05-26 RX ADMIN — INSULIN LISPRO 3 UNITS: 100 INJECTION, SOLUTION INTRAVENOUS; SUBCUTANEOUS at 17:37

## 2017-05-26 RX ADMIN — METHYLPREDNISOLONE SODIUM SUCCINATE 40 MG: 125 INJECTION, POWDER, FOR SOLUTION INTRAMUSCULAR; INTRAVENOUS at 21:17

## 2017-05-26 RX ADMIN — ALPRAZOLAM 0.25 MG: 0.25 TABLET ORAL at 04:05

## 2017-05-26 RX ADMIN — ALBUTEROL SULFATE 2.5 MG: 2.5 SOLUTION RESPIRATORY (INHALATION) at 15:33

## 2017-05-26 RX ADMIN — ALBUTEROL SULFATE 2.5 MG: 2.5 SOLUTION RESPIRATORY (INHALATION) at 11:21

## 2017-05-26 RX ADMIN — AMLODIPINE BESYLATE 5 MG: 10 TABLET ORAL at 21:17

## 2017-05-26 RX ADMIN — ALPRAZOLAM 0.25 MG: 0.25 TABLET ORAL at 13:55

## 2017-05-26 RX ADMIN — LEVOTHYROXINE SODIUM 100 MCG: 100 TABLET ORAL at 05:20

## 2017-05-26 RX ADMIN — DOXYCYCLINE 100 MG: 100 INJECTION, POWDER, LYOPHILIZED, FOR SOLUTION INTRAVENOUS at 05:18

## 2017-05-26 RX ADMIN — ASPIRIN 81 MG: 81 TABLET, COATED ORAL at 08:53

## 2017-05-26 RX ADMIN — Medication 10 ML: at 14:00

## 2017-05-26 RX ADMIN — MONTELUKAST SODIUM 10 MG: 10 TABLET, FILM COATED ORAL at 21:17

## 2017-05-26 RX ADMIN — BUDESONIDE 500 MCG: 0.5 SUSPENSION RESPIRATORY (INHALATION) at 19:56

## 2017-05-26 RX ADMIN — ALBUTEROL SULFATE 2.5 MG: 2.5 SOLUTION RESPIRATORY (INHALATION) at 19:55

## 2017-05-26 RX ADMIN — BUDESONIDE 500 MCG: 0.5 SUSPENSION RESPIRATORY (INHALATION) at 08:11

## 2017-05-26 RX ADMIN — LORATADINE 10 MG: 10 TABLET ORAL at 08:52

## 2017-05-26 RX ADMIN — ENOXAPARIN SODIUM 40 MG: 40 INJECTION SUBCUTANEOUS at 23:20

## 2017-05-26 RX ADMIN — ALBUTEROL SULFATE 2.5 MG: 2.5 SOLUTION RESPIRATORY (INHALATION) at 08:11

## 2017-05-26 RX ADMIN — DULOXETINE HYDROCHLORIDE 60 MG: 60 CAPSULE, DELAYED RELEASE ORAL at 17:37

## 2017-05-26 RX ADMIN — METHYLPREDNISOLONE SODIUM SUCCINATE 40 MG: 125 INJECTION, POWDER, FOR SOLUTION INTRAMUSCULAR; INTRAVENOUS at 08:54

## 2017-05-26 RX ADMIN — INSULIN LISPRO 1 UNITS: 100 INJECTION, SOLUTION INTRAVENOUS; SUBCUTANEOUS at 08:53

## 2017-05-26 RX ADMIN — INSULIN LISPRO 2 UNITS: 100 INJECTION, SOLUTION INTRAVENOUS; SUBCUTANEOUS at 21:22

## 2017-05-26 RX ADMIN — INSULIN LISPRO 2 UNITS: 100 INJECTION, SOLUTION INTRAVENOUS; SUBCUTANEOUS at 12:20

## 2017-05-26 RX ADMIN — CEFTRIAXONE 1 G: 1 INJECTION, POWDER, FOR SOLUTION INTRAMUSCULAR; INTRAVENOUS at 15:35

## 2017-05-26 RX ADMIN — PANTOPRAZOLE SODIUM 40 MG: 40 TABLET, DELAYED RELEASE ORAL at 05:20

## 2017-05-26 RX ADMIN — ALPRAZOLAM 0.25 MG: 0.25 TABLET ORAL at 21:17

## 2017-05-26 NOTE — PROGRESS NOTES
[facility]  Admission Date: 5/24/2017             Daily Progress Note: 5/26/2017    The patient's chart is reviewed and the patient is discussed with the staff. Pt is a 66 yo  female with a history of COPD, LUIS FERNANDO on CPAP, HTN, PAH, pulmonary nodule, and chronic pain. Pt presented to the ER on 5/24 with wheezing, cough with purulent sputum, and myalgias. Pt was admitted for COPD exacerbation.    Subjective:     Feels better,still SOB less myalgias  On RA    Current Facility-Administered Medications   Medication Dose Route Frequency    doxycycline (VIBRAMYCIN) 100 mg in 0.9% sodium chloride (MBP/ADV) 100 mL  100 mg IntraVENous Q12H    methylPREDNISolone (PF) (SOLU-MEDROL) injection 40 mg  40 mg IntraVENous Q12H    ALPRAZolam (XANAX) tablet 0.25 mg  0.25 mg Oral TID PRN    amLODIPine (NORVASC) tablet 5 mg  5 mg Oral QHS    aspirin delayed-release tablet 81 mg  81 mg Oral 7am    carisoprodol (SOMA) tablet 175 mg  175 mg Oral QHS    loratadine (CLARITIN) tablet 10 mg  10 mg Oral DAILY    DULoxetine (CYMBALTA) capsule 60 mg  60 mg Oral BID    levothyroxine (SYNTHROID) tablet 100 mcg  100 mcg Oral ACB    montelukast (SINGULAIR) tablet 10 mg  10 mg Oral QHS    pantoprazole (PROTONIX) tablet 40 mg  40 mg Oral ACB    roflumilast (DALIRESP) tablet 500 mcg - PATIENT SUPPLIED  500 mcg Oral DAILY    sodium chloride (NS) flush 5-10 mL  5-10 mL IntraVENous Q8H    sodium chloride (NS) flush 5-10 mL  5-10 mL IntraVENous PRN    albuterol (PROVENTIL VENTOLIN) nebulizer solution 2.5 mg  2.5 mg Nebulization QID RT    budesonide (PULMICORT) 500 mcg/2 ml nebulizer suspension  500 mcg Nebulization BID RT    enoxaparin (LOVENOX) injection 40 mg  40 mg SubCUTAneous Q24H    HYDROcodone-homatropine (HYCODAN) tablet 1 Tab  1 Tab Oral Q4H PRN    insulin lispro (HUMALOG) injection   SubCUTAneous AC&HS    cefTRIAXone (ROCEPHIN) 1 g in 0.9% sodium chloride (MBP/ADV) 50 mL  1 g IntraVENous Q24H Review of Systems  +cough  +weakness  +fatigue  Constitutional: negative for fever, chills, sweats  Cardiovascular: negative for chest pain, palpitations, syncope, edema  Gastrointestinal:  negative for dysphagia, reflux, vomiting, diarrhea, abdominal pain, or melena  Neurologic:  negative for numbness, headache    Objective:     Vitals:    05/26/17 0349 05/26/17 0714 05/26/17 0811 05/26/17 1123   BP: 116/51 118/51  110/49   Pulse: 91 83  87   Resp: 22 19 16   Temp: 97.8 °F (36.6 °C) 97.8 °F (36.6 °C)  97.9 °F (36.6 °C)   SpO2: 92% 94% 93% 95%   Weight:       Height:         Intake and Output:      05/26 0701 - 05/26 1900  In: 240 [P.O.:240]  Out: -     Physical Exam:   Constitution:  the patient is well developed and in no acute distress, on RA  EENMT:  Sclera clear, pupils equal, oral mucosa moist  Respiratory: faint insp wheezing  Cardiovascular:  RRR without M,G,R  Gastrointestinal: soft and non-tender; with positive bowel sounds. Musculoskeletal: warm without cyanosis. There is no lower leg edema.   Skin:  no jaundice or rashes   Neurologic: no gross neuro deficits     Psychiatric:  alert and oriented x 3    CHEST XRAY:           LAB  Recent Labs      05/26/17   1053  05/26/17   0748  05/25/17   2027  05/25/17   1617  05/25/17   0827   GLUCPOC  210*  152*  234*  254*  171*      Recent Labs      05/26/17   0657  05/25/17   0428  05/24/17   1305   WBC  12.1*  8.8  18.6*   HGB  11.8  12.2  12.7   HCT  33.3*  35.4*  36.6   PLT  339  280  279     Recent Labs      05/25/17   0428  05/24/17   1305   NA  132*  135*   K  4.7  4.4   CL  99  99   CO2  23  23   GLU  208*  144*   BUN  23  19   CREA  0.90  1.01*   CA  9.5  9.3   ALB   --   3.2   TBILI   --   0.6   ALT   --   13   SGOT   --   10*         Assessment:  (Medical Decision Making)     Hospital Problems  Date Reviewed: 5/25/2017          Codes Class Noted POA    Bronchitis-on Rocephin/Doxycycline ICD-10-CM: J40  ICD-9-CM: 387  5/24/2017 Unknown        Acute bacterial sinusitis-continue abx ICD-10-CM: J01.90, B96.89  ICD-9-CM: 461.9  5/24/2017 Unknown        * (Principal)COPD exacerbation (HCC)-continue abx, solumedrol ICD-10-CM: J44.1  ICD-9-CM: 491.21  1/9/2015 Yes              Plan:  (Medical Decision Making)     --on RA  --Rocephin  --on solumedrol, wean dose  --continue nebs  --continue daliresp  --hopefully home soon, maybe in AM    More than 50% of the time documented was spent in face-to-face contact with the patient and in the care of the patient on the floor/unit where the patient is located.     Mini Sheth MD

## 2017-05-26 NOTE — PROGRESS NOTES
Pt has own daliresp 500mcg tablets. I will take to pharmacy to be verified. I counted tablets in front of pt and daughter. There are 26 tablets in bottle.

## 2017-05-27 VITALS
HEIGHT: 62 IN | TEMPERATURE: 98.2 F | BODY MASS INDEX: 27.94 KG/M2 | SYSTOLIC BLOOD PRESSURE: 145 MMHG | OXYGEN SATURATION: 96 % | DIASTOLIC BLOOD PRESSURE: 59 MMHG | WEIGHT: 151.8 LBS | HEART RATE: 97 BPM | RESPIRATION RATE: 21 BRPM

## 2017-05-27 PROBLEM — E11.9 DIABETES MELLITUS, TYPE II (HCC): Chronic | Status: ACTIVE | Noted: 2017-05-27

## 2017-05-27 LAB
ERYTHROCYTE [DISTWIDTH] IN BLOOD BY AUTOMATED COUNT: 12.8 % (ref 11.9–14.6)
GLUCOSE BLD STRIP.AUTO-MCNC: 131 MG/DL (ref 65–100)
GLUCOSE BLD STRIP.AUTO-MCNC: 141 MG/DL (ref 65–100)
GLUCOSE BLD STRIP.AUTO-MCNC: 142 MG/DL (ref 65–100)
HCT VFR BLD AUTO: 34.9 % (ref 35.8–46.3)
HGB BLD-MCNC: 12.2 G/DL (ref 11.7–15.4)
MCH RBC QN AUTO: 30.5 PG (ref 26.1–32.9)
MCHC RBC AUTO-ENTMCNC: 35 G/DL (ref 31.4–35)
MCV RBC AUTO: 87.3 FL (ref 79.6–97.8)
PLATELET # BLD AUTO: 357 K/UL (ref 150–450)
PMV BLD AUTO: 10 FL (ref 10.8–14.1)
RBC # BLD AUTO: 4 M/UL (ref 4.05–5.25)
WBC # BLD AUTO: 8.5 K/UL (ref 4.3–11.1)

## 2017-05-27 PROCEDURE — 74011250637 HC RX REV CODE- 250/637: Performed by: INTERNAL MEDICINE

## 2017-05-27 PROCEDURE — 94640 AIRWAY INHALATION TREATMENT: CPT

## 2017-05-27 PROCEDURE — 74011250637 HC RX REV CODE- 250/637: Performed by: NURSE PRACTITIONER

## 2017-05-27 PROCEDURE — 85027 COMPLETE CBC AUTOMATED: CPT | Performed by: INTERNAL MEDICINE

## 2017-05-27 PROCEDURE — 74011000250 HC RX REV CODE- 250: Performed by: INTERNAL MEDICINE

## 2017-05-27 PROCEDURE — 99238 HOSP IP/OBS DSCHRG MGMT 30/<: CPT | Performed by: INTERNAL MEDICINE

## 2017-05-27 PROCEDURE — 82962 GLUCOSE BLOOD TEST: CPT

## 2017-05-27 PROCEDURE — 74011250636 HC RX REV CODE- 250/636: Performed by: PHYSICIAN ASSISTANT

## 2017-05-27 PROCEDURE — 97161 PT EVAL LOW COMPLEX 20 MIN: CPT

## 2017-05-27 PROCEDURE — 36415 COLL VENOUS BLD VENIPUNCTURE: CPT | Performed by: INTERNAL MEDICINE

## 2017-05-27 PROCEDURE — 94760 N-INVAS EAR/PLS OXIMETRY 1: CPT

## 2017-05-27 RX ORDER — PREDNISONE 20 MG/1
40 TABLET ORAL
Status: DISCONTINUED | OUTPATIENT
Start: 2017-05-28 | End: 2017-05-27 | Stop reason: HOSPADM

## 2017-05-27 RX ORDER — PREDNISONE 10 MG/1
40 TABLET ORAL
Qty: 30 TAB | Refills: 0 | Status: SHIPPED | OUTPATIENT
Start: 2017-05-28 | End: 2017-06-14

## 2017-05-27 RX ORDER — AMOXICILLIN AND CLAVULANATE POTASSIUM 875; 125 MG/1; MG/1
1 TABLET, FILM COATED ORAL EVERY 12 HOURS
Qty: 8 TAB | Refills: 0 | Status: SHIPPED | OUTPATIENT
Start: 2017-05-27 | End: 2017-06-14

## 2017-05-27 RX ORDER — AMOXICILLIN AND CLAVULANATE POTASSIUM 875; 125 MG/1; MG/1
1 TABLET, FILM COATED ORAL EVERY 12 HOURS
Status: DISCONTINUED | OUTPATIENT
Start: 2017-05-27 | End: 2017-05-27 | Stop reason: HOSPADM

## 2017-05-27 RX ADMIN — BUDESONIDE 500 MCG: 0.5 SUSPENSION RESPIRATORY (INHALATION) at 07:11

## 2017-05-27 RX ADMIN — ASPIRIN 81 MG: 81 TABLET, COATED ORAL at 10:18

## 2017-05-27 RX ADMIN — ALBUTEROL SULFATE 2.5 MG: 2.5 SOLUTION RESPIRATORY (INHALATION) at 11:15

## 2017-05-27 RX ADMIN — METHYLPREDNISOLONE SODIUM SUCCINATE 40 MG: 125 INJECTION, POWDER, FOR SOLUTION INTRAMUSCULAR; INTRAVENOUS at 10:21

## 2017-05-27 RX ADMIN — PANTOPRAZOLE SODIUM 40 MG: 40 TABLET, DELAYED RELEASE ORAL at 06:15

## 2017-05-27 RX ADMIN — DULOXETINE HYDROCHLORIDE 60 MG: 60 CAPSULE, DELAYED RELEASE ORAL at 10:18

## 2017-05-27 RX ADMIN — LORATADINE 10 MG: 10 TABLET ORAL at 10:18

## 2017-05-27 RX ADMIN — Medication 10 ML: at 06:00

## 2017-05-27 RX ADMIN — Medication 10 ML: at 14:00

## 2017-05-27 RX ADMIN — LEVOTHYROXINE SODIUM 100 MCG: 100 TABLET ORAL at 06:15

## 2017-05-27 RX ADMIN — ALBUTEROL SULFATE 2.5 MG: 2.5 SOLUTION RESPIRATORY (INHALATION) at 15:19

## 2017-05-27 RX ADMIN — ALBUTEROL SULFATE 2.5 MG: 2.5 SOLUTION RESPIRATORY (INHALATION) at 07:11

## 2017-05-27 RX ADMIN — AMOXICILLIN AND CLAVULANATE POTASSIUM 1 TABLET: 875; 125 TABLET, FILM COATED ORAL at 17:12

## 2017-05-27 NOTE — PROGRESS NOTES
Problem: Mobility Impaired (Adult and Pediatric)  Goal: *Therapy Goal (Edit Goal, Insert Text)  No goals are indicated as patient is functioning at her prior baseline, with exception of slight dyspnea.   ________________________________________________________________________________________________       PHYSICAL THERAPY: INITIAL ASSESSMENT, TREATMENT DAY: DAY OF ASSESSMENT AND AND DISCHARGE 5/27/2017  INPATIENT: Hospital Day: 4  Payor: Clarice  / Plan: [address] [place] [address] HMO / Product Type: [employer] Medicare /      NAME/AGE/GENDER: Ariza Favorite is a 65 y.o. female      PRIMARY DIAGNOSIS: COPD exacerbation ([facility] [address] COPD exacerbation ([facility] [address] COPD exacerbation ([facility] [address]        ICD-10: Treatment Diagnosis:       · Other lack of cordination (R27.8)   Precaution/Allergies:  Sulfa (sulfonamide antibiotics); Abilify [aripiprazole]; Chantix [varenicline]; Desyrel [trazodone]; Lipitor [atorvastatin]; Lisinopril; Nolamine; Other medication; and Zocor [simvastatin]       ASSESSMENT:      Ms. Martin Villalobos at virtual baseline for transfers, ambulation and mobility with above diagnosis. She demonstrates transfers of modified independent to independent. She ambulates without an assistive device today for 300 feet before return to room with mild SOB and is seated by her daughter in preparation for home discharge. Skilled PT is indicated for patient safety and mobility progression during current acute care episode. This section established at most recent assessment   PROBLEM LIST (Impairments causing functional limitations): 1. none at this time. INTERVENTIONS PLANNED: (Benefits and precautions of physical therapy have been discussed with the patient.)  1. none indicated. \      TREATMENT PLAN: Frequency/Duration: no additional PT indicated. Rehabilitation Potential For Stated Goals: EXCELLENT      RECOMMENDED REHABILITATION/EQUIPMENT: (at time of discharge pending progress): none at this time. HISTORY:   History of Present Injury/Illness (Reason for Referral):  Patient is a 65 y.o.  female presents with cough and shortness of breath. She states she has a history of COPD and is on symbicort and prn albuterol at home. She reports that about 2 weeks ago she developed sinus congestion and pressure. She was treated with z pack by her pcp but her symptoms progressively worsened. She then developed cough productive of purulent sounding sputum as well as fevers and diffuse myalgias. She reports wheeze and shortness of breath. [facility] but WBC was 18 and she had diffuse wheezing. She has been treated in the ER but is felt to need hospital admission for ongoing wheeze and COPD exacerbation. Past Medical History/Comorbidities:   Ms. Rupa Rivera  has a past medical history of Abnormal chest CT (1/13/2015); Allergic rhinitis; Anxiety (1/12/2015); Arthritis; Asthma; Bronchiectasis ([facility] [address] CAD (coronary artery disease) (06/17/2015); Carotid artery stenosis without cerebral infarction (9/28/2015); Carotid stenosis, right (4/8/2014); Cholelithiasis; Chronic back pain; Chronic pain syndrome (11/25/2015); Chronic renal insufficiency; Chronic restrictive lung disease; Congestive heart failure (CHF) ([facility]); COPD (chronic obstructive pulmonary disease) ([facility] [address] Decreased vision; Depression; Diabetes mellitus, type II ([facility] [address] Diarrhea; Diverticulitis; Dizziness; Dry eyes; Dyspnea/shortness of breath (9/28/2015); GERD (gastroesophageal reflux disease); Glaucoma; History of pneumonia (1/2015); Hyperkalemia; Hyperlipidemia ( ); Hypertension; Hyposmolality and/or hyponatremia; Hypothyroidism; Leg pain; Macular degeneration; Menopausal disorder; Neuropathy; LUIS FERNANDO on CPAP (4/9/2015); Proteinuria; Pruritic disorder; PVD (peripheral vascular disease) ([facility]); SOB (shortness of breath) on exertion; Spinal stenosis; Trigger thumb;  Unspecified asthma; Unspecified sleep apnea; and Venous stasis. Ms. Agustina Bee  has a past surgical history that includes appendectomy; cataract removal (Bilateral); carotid endarterectomy (Left, 1/14); carotid endarterectomy (Right, 4/14); lumbar fusion; back surgery (11/9/15); tushar and bso (1978); other surgical (02/14/2000); and myringotomy (Right). Social History/Living Environment:   Home Environment: Trailer/mobile home  One/Two Story Residence: One story  Living Alone: No  Support Systems: Family member(s), Child(kayla)  Patient Expects to be Discharged to[de-identified] Trailer/mobile home  Current DME Used/Available at Home: CPAP, Walker, rollator, [facility], [place], quad, [address] [place] [address], [place], straight  Prior Level of Function/Work/Activity:  Had been independent with the above equipment available for her use. Dominant Side:         RIGHT   Number of Personal Factors/Comorbidities that affect the Plan of Care: 0: LOW COMPLEXITY   EXAMINATION:   Most Recent Physical Functioning:   Gross Assessment:  AROM: Generally decreased, functional  Strength: Generally decreased, functional  Coordination: Generally decreased, functional  Tone: Normal  Sensation: Intact               Posture:  Posture (WDL): Exceptions to WDL  Posture Assessment: Cervical, Forward head, Rounded shoulders  Balance:  Sitting: Intact  Standing: Intact Bed Mobility:  Rolling: Independent; Modified independent  Supine to Sit: Independent; Modified independent  Sit to Supine: Independent; Modified independent  Scooting: Independent; Modified independent  Wheelchair Mobility:     Transfers:  Sit to Stand: Independent; Modified independent  Stand to Sit: Independent; Modified independent  Bed to Chair: Independent; Modified independent  Gait:     Gait Abnormalities: Trunk sway increased (mild dyspnea with exertion)  Distance (ft): 300 Feet (ft)  Ambulation - Level of Assistance: Independent; Modified independent       Body Structures Involved:  1. Bones  2. Muscles  3.  Ligaments Body Functions Affected:  1. Neuromusculoskeletal  2. Movement Related  3. Metobolic/Endocrine Activities and Participation Affected:  1. Mobility  2. Self Care  3. Community, Social and [place]   Number of elements that affect the Plan of Care: 1-2: LOW COMPLEXITY   CLINICAL PRESENTATION:   Presentation: Stable and uncomplicated: LOW COMPLEXITY   CLINICAL DECISION MAKIN [facility] Mobility Inpatient Short Form  How much difficulty does the patient currently have. .. Unable A Lot A Little None   1. Turning over in bed (including adjusting bedclothes, sheets and blankets)? [ ] 1   [ ] 2   [ ] 3   [X] 4   2. Sitting down on and standing up from a chair with arms ( e.g., wheelchair, bedside commode, etc.)   [ ] 1   [ ] 2   [ ] 3   [X] 4   3. Moving from lying on back to sitting on the side of the bed? [ ] 1   [ ] 2   [ ] 3   [X] 4   How much help from another person does the patient currently need. .. Total A Lot A Little None   4. Moving to and from a bed to a chair (including a wheelchair)? [ ] 1   [ ] 2   [ ] 3   [X] 4   5. Need to walk in hospital room? [ ] 1   [ ] 2   [ ] 3   [X] 4   6. Climbing 3-5 steps with a railing? [ ] 1   [ ] 2   [ ] 3   [X] 4   © , Trustees of [address] [place] [address], under license to [employer]. All rights reserved    Score:  Initial: 24 Most Recent: X (Date: -- )     Interpretation of Tool:  Represents activities that are increasingly more difficult (i.e. Bed mobility, Transfers, Gait).        Score 24 23 22-20 19-15 14-10 9-7 6       Modifier CH CI CJ CK CL CM CN         · Mobility - Walking and Moving Around:               - CURRENT STATUS:    CH - 0% impaired, limited or restricted               - GOAL STATUS:           CH - 0% impaired, limited or restricted               - D/C STATUS:                       CH - 0% impaired, limited or restricted  Payor: Nena Almeida / Plan: [address] [place] [address] HMO / Product Type: Managed Care Medicare /       Medical Necessity:     · Patient demonstrates excellent rehab potential due to higher previous functional level. Reason for Services/Other Comments:  · No additional PT indicated at this time. Use of outcome tool(s) and clinical judgement create a POC that gives a: Clear prediction of patient's progress: LOW COMPLEXITY                 TREATMENT:   (In addition to Assessment/Re-Assessment sessions the following treatments were rendered)   Pre-treatment Symptoms/Complaints:  0/10 no pain reported. Pain: Initial:   Pain Intensity 1: 0  Post Session:  0/10 no pain reported. Assessment/Reassessment only, no treatment provided today     Braces/Orthotics/Lines/Etc:   · O2 Device: Room air  Treatment/Session Assessment:    · Response to Treatment:  Initial evaluation and discharge      · Interdisciplinary Collaboration:  · Registered Nurse  · After treatment position/precautions:  · Up in chair  · Bed alarm/tab alert on  · Bed/Chair-wheels locked  · Bed in low position  · Call light within reach  · Family at bedside  · Compliance with Program/Exercises: compliant all of the time. · Recommendations/Intent for next treatment session: Patient discharge home at this time.   Total Treatment Duration:  PT Patient Time In/Time Out  Time In: 1541  Time Out: [address] [place]

## 2017-05-27 NOTE — PROGRESS NOTES
Pulmonary NP requesting ambulating o2 sat. CNA in to walk pt. Pt's ambulatory sat is 92-94% on room air.

## 2017-05-27 NOTE — DISCHARGE INSTRUCTIONS
Bronchitis: Care Instructions  Your Care Instructions    Bronchitis is inflammation of the bronchial tubes, which carry air to the lungs. The tubes swell and produce mucus, or phlegm. The mucus and inflamed bronchial tubes make you cough. You may have trouble breathing. Most cases of bronchitis are caused by viruses like those that cause colds. Antibiotics usually do not help and they may be harmful. Bronchitis usually develops rapidly and lasts about 2 to 3 weeks in otherwise healthy people. Follow-up care is a key part of your treatment and safety. Be sure to make and go to all appointments, and call your doctor if you are having problems. It's also a good idea to know your test results and keep a list of the medicines you take. How can you care for yourself at home? · Take all medicines exactly as prescribed. Call your doctor if you think you are having a problem with your medicine. · Get some extra rest.  · Take an over-the-counter pain medicine, such as acetaminophen (Tylenol), ibuprofen (Advil, Motrin), or naproxen (Aleve) to reduce fever and relieve body aches. Read and follow all instructions on the label. · Do not take two or more pain medicines at the same time unless the doctor told you to. Many pain medicines have acetaminophen, which is Tylenol. Too much acetaminophen (Tylenol) can be harmful. · Take an over-the-counter cough medicine that contains dextromethorphan to help quiet a dry, hacking cough so that you can sleep. Avoid cough medicines that have more than one active ingredient. Read and follow all instructions on the label. · Breathe moist air from a humidifier, hot shower, or sink filled with hot water. The heat and moisture will thin mucus so you can cough it out. · Do not smoke. Smoking can make bronchitis worse. If you need help quitting, talk to your doctor about stop-smoking programs and medicines. These can increase your chances of quitting for good.   When should you call for help? Call 911 anytime you think you may need emergency care. For example, call if:  · You have severe trouble breathing. Call your doctor now or seek immediate medical care if:  · You have new or worse trouble breathing. · You cough up dark brown or bloody mucus (sputum). · You have a new or higher fever. · You have a new rash. Watch closely for changes in your health, and be sure to contact your doctor if:  · You cough more deeply or more often, especially if you notice more mucus or a change in the color of your mucus. · You are not getting better as expected. Where can you learn more? Go to http://vamsi-hardik.info/. Enter H333 in the search box to learn more about \"Bronchitis: Care Instructions. \"  Current as of: May 23, 2016  Content Version: 11.2  © 3928-5948 [employer]. Care instructions adapted under license by [employer] (which disclaims liability or warranty for this information). If you have questions about a medical condition or this instruction, always ask your healthcare professional. [place] [address] any warranty or liability for your use of this information. Chronic Obstructive Pulmonary Disease (COPD): Care Instructions  Your Care Instructions    Chronic obstructive pulmonary disease (COPD) is a general term for a group of lung diseases, including emphysema and chronic bronchitis. People with COPD have decreased airflow in and out of the lungs, which makes it hard to breathe. The airways also can get clogged with thick mucus. Cigarette smoking is a major cause of COPD. Although there is no cure for COPD, you can slow its progress. Following your treatment plan and taking care of yourself can help you feel better and live longer. Follow-up care is a key part of your treatment and safety. Be sure to make and go to all appointments, and call your doctor if you are having problems.  It's also a good idea to know your test results and keep a list of the medicines you take. How can you care for yourself at home? Staying healthy  · Do not smoke. This is the most important step you can take to prevent more damage to your lungs. If you need help quitting, talk to your doctor about stop-smoking programs and medicines. These can increase your chances of quitting for good. · Avoid colds and flu. Get a pneumococcal vaccine shot. If you have had one before, ask your doctor whether you need a second dose. Get the flu vaccine every fall. If you must be around people with colds or the flu, wash your hands often. · Avoid secondhand smoke, air pollution, and high altitudes. Also avoid cold, dry air and hot, humid air. Stay at home with your windows closed when air pollution is bad. Medicines and oxygen therapy  · Take your medicines exactly as prescribed. Call your doctor if you think you are having a problem with your medicine. · You may be taking medicines such as:  ¨ Bronchodilators. These help open your airways and make breathing easier. Bronchodilators are either short-acting (work for 6 to 9 hours) or long-acting (work for 24 hours). You inhale most bronchodilators, so they start to act quickly. Always carry your quick-relief inhaler with you in case you need it while you are away from home. ¨ Corticosteroids (prednisone, budesonide). These reduce airway inflammation. They come in pill or inhaled form. You must take these medicines every day for them to work well. · A spacer may help you get more inhaled medicine to your lungs. Ask your doctor or pharmacist if a spacer is right for you. If it is, ask how to use it properly. · Do not take any vitamins, over-the-counter medicine, or herbal products without talking to your doctor first.  · If your doctor prescribed antibiotics, take them as directed. Do not stop taking them just because you feel better. You need to take the full course of antibiotics.   · Oxygen therapy boosts the amount of oxygen in your blood and helps you breathe easier. Use the flow rate your doctor has recommended, and do not change it without talking to your doctor first.  Activity  · Get regular exercise. Walking is an easy way to get exercise. Start out slowly, and walk a little more each day. · Pay attention to your breathing. You are exercising too hard if you cannot talk while you are exercising. · Take short rest breaks when doing household chores and other activities. · Learn breathing methods--such as breathing through pursed lips--to help you become less short of breath. · If your doctor has not set you up with a pulmonary rehabilitation program, talk to him or her about whether rehab is right for you. Rehab includes exercise programs, education about your disease and how to manage it, help with diet and other changes, and emotional support. Diet  · Eat regular, healthy meals. Use bronchodilators about 1 hour before you eat to make it easier to eat. Eat several small meals instead of three large ones. Drink beverages at the end of the meal. Avoid foods that are hard to chew. · Eat foods that contain protein so that you do not lose muscle mass. Mental health  · Talk to your family, friends, or a therapist about your feelings. It is normal to feel frightened, angry, hopeless, helpless, and even guilty. Talking openly about bad feelings can help you cope. If these feelings last, talk to your doctor. When should you call for help? Call 911 anytime you think you may need emergency care. For example, call if:  · You have severe trouble breathing. Call your doctor now or seek immediate medical care if:  · You have new or worse trouble breathing. · You cough up blood. · You have a fever. Watch closely for changes in your health, and be sure to contact your doctor if:  · You cough more deeply or more often, especially if you notice more mucus or a change in the color of your mucus.   · You have new or worse swelling in your legs or belly. · You are not getting better as expected. Where can you learn more? Go to http://vamsi-hardik.info/. Matt Green in the search box to learn more about \"Chronic Obstructive Pulmonary Disease (COPD): Care Instructions. \"  Current as of: May 23, 2016  Content Version: 11.2  © 5767-7880 [employer]. Care instructions adapted under license by [employer] (which disclaims liability or warranty for this information). If you have questions about a medical condition or this instruction, always ask your healthcare professional. [place] [address] any warranty or liability for your use of this information. DISCHARGE SUMMARY from Nurse    The following personal items are in your possession at time of discharge:    Dental Appliances: None        Home Medications: None  Jewelry: None  Clothing: Shirt, Pants, Footwear, Undergarments  Other Valuables: None             PATIENT INSTRUCTIONS:    After general anesthesia or intravenous sedation, for 24 hours or while taking prescription Narcotics:  · Limit your activities  · Do not drive and operate hazardous machinery  · Do not make important personal or business decisions  · Do  not drink alcoholic beverages  · If you have not urinated within 8 hours after discharge, please contact your surgeon on call. Report the following to your surgeon:  · Excessive pain, swelling, redness or odor of or around the surgical area  · Temperature over 100.5  · Nausea and vomiting lasting longer than 4 hours or if unable to take medications  · Any signs of decreased circulation or nerve impairment to extremity: change in color, persistent  numbness, tingling, coldness or increase pain  · Any questions        What to do at Home:  Recommended activity: Activity as tolerated,   *  Please give a list of your current medications to your Primary Care Provider.     *  Please update this list whenever your medications are discontinued, doses are      changed, or new medications (including over-the-counter products) are added. *  Please carry medication information at all times in case of emergency situations. These are general instructions for a healthy lifestyle:    No smoking/ No tobacco products/ Avoid exposure to second hand smoke    Surgeon General's Warning:  Quitting smoking now greatly reduces serious risk to your health. Obesity, smoking, and sedentary lifestyle greatly increases your risk for illness    A healthy diet, regular physical exercise & weight monitoring are important for maintaining a healthy lifestyle    You may be retaining fluid if you have a history of heart failure or if you experience any of the following symptoms:  Weight gain of 3 pounds or more overnight or 5 pounds in a week, increased swelling in our hands or feet or shortness of breath while lying flat in bed. Please call your doctor as soon as you notice any of these symptoms; do not wait until your next office visit. Recognize signs and symptoms of STROKE:    F-face looks uneven    A-arms unable to move or move unevenly    S-speech slurred or non-existent    T-time-call 911 as soon as signs and symptoms begin-DO NOT go       Back to bed or wait to see if you get better-TIME IS BRAIN. Warning Signs of HEART ATTACK     Call 911 if you have these symptoms:   Chest discomfort. Most heart attacks involve discomfort in the center of the chest that lasts more than a few minutes, or that goes away and comes back. It can feel like uncomfortable pressure, squeezing, fullness, or pain.  Discomfort in other areas of the upper body. Symptoms can include pain or discomfort in one or both arms, the back, neck, jaw, or stomach.  Shortness of breath with or without chest discomfort.  Other signs may include breaking out in a cold sweat, nausea, or lightheadedness.   Don't wait more than five minutes to call 911 - MINUTES MATTER! Fast action can save your life. Calling 911 is almost always the fastest way to get lifesaving treatment. Emergency Medical Services staff can begin treatment when they arrive -- up to an hour sooner than if someone gets to the hospital by car. The discharge information has been reviewed with the patient. The patient verbalized understanding. Discharge medications reviewed with the patient and appropriate educational materials and side effects teaching were provided.

## 2017-05-27 NOTE — PROGRESS NOTES
Care Management Interventions  Mode of Transport at Discharge:  (daughter)  Transition of Care Consult (CM Consult): Home Health [facility])  [address] Yes  Discharge Durable Medical Equipment: No  Physical Therapy Consult: No  Occupational Therapy Consult: No  Current Support Network: Own Home, Family Lives Nearby  Confirm Follow Up Transport: Family  Plan discussed with Pt/Family/Caregiver: Yes  Freedom of Choice Offered: Yes  Discharge Location  Discharge Placement: Home with home health (Home with [facility] for follow up care.)

## 2017-05-27 NOTE — DISCHARGE SUMMARY
DISCHARGE NOTE    [place]  Admission date:  5/24/2017  Discharge date:  5/27/2017    Admitting Diagnosis:  COPD exacerbation [facility])    Discharge Diagnoses:    Hospital Problems  Date Reviewed: 5/25/2017          Codes Class Noted POA    Bronchitis ICD-10-CM: J40  ICD-9-CM: 490  5/24/2017 Unknown        Acute bacterial sinusitis ICD-10-CM: J01.90, B96.89  ICD-9-CM: 461.9  5/24/2017 Unknown        * (Principal)COPD exacerbation ([facility] [address] ICD-10-CM: J44.1  ICD-9-CM: 491.21  1/9/2015 Yes              Consultants:  None     Studies/Procedures:  CXR    Condition on Discharge:    Stable     Disposition:    Home with Ernesto Dykes    Presenting Illness / Hospital course:  Patient is a 65 y.o.  female with a history of chronic pain, asthma/COPD, bronchiectasis, DM, HTN, HL, hypothyroidism, and CAD, presented with cough and shortness of breath. She states she has a history of COPD and is on symbicort and prn albuterol at home. She reports that about 2 weeks ago she developed sinus congestion and pressure. She was treated with z-pack by her PCP but her symptoms progressively worsened. She then developed cough productive of purulent sounding sputum as well as fevers and diffuse myalgias. She reported wheeze and shortness of breath. She presented to the ER where sats and CXR were normal but WBC was 18 and she had diffuse wheezing. She received treatment in the ER but had ongoing wheeze and was admitted for COPD exacerbation likely brought on by URI vs sinusitis. She was started on nebulizer tx, steroids, and abx. Influenza negative. Sputum cx preliminarily NRF. Patient slowly improved. O2 was weaned to RA with O2 sats remaining in the 90s at rest and with ambulation. Patient is now felt stable for discharge home. Abx were transitioned to oral Augmentin and she will complete a 7 day course of abx and a steroid taper and follow up in the office.         LAB  Recent Labs      05/27/17   0708 05/26/17   0657  05/25/17   0428   WBC  8.5  12.1*  8.8   HGB  12.2  11.8  12.2   HCT  34.9*  33.3*  35.4*   PLT  357  339  280     Recent Labs      05/25/17   0428   NA  132*   K  4.7   CL  99   CO2  23   BUN  23   CREA  0.90   CA  9.5       Discharge Medications:   Current Discharge Medication List      START taking these medications    Details   amoxicillin-clavulanate (AUGMENTIN) 875-125 mg per tablet Take 1 Tab by mouth every twelve (12) hours. Qty: 8 Tab, Refills: 0      predniSONE (DELTASONE) 10 mg tablet Take 4 Tabs by mouth daily (with breakfast). 40mg per day for 3 days, then 30mg per day for 3 days, 20mg per day for 3 days then 10mg per day for 3 days, then stop. Qty: 30 Tab, Refills: 0         CONTINUE these medications which have NOT CHANGED    Details   albuterol (PROVENTIL HFA) 90 mcg/actuation inhaler Take 2 Puffs by inhalation every four (4) hours as needed for Wheezing. calcium citrate-vitamin d3 (CITRACAL+D) 315-200 mg-unit tab Take 2 Tabs by mouth two (2) times a day. Indications: POST-MENOPAUSAL OSTEOPOROSIS PREVENTION, crush pills with pill   Qty: 120 Tab, Refills: 11      DULoxetine (CYMBALTA) 60 mg capsule Take 1 Cap by mouth two (2) times a day. Indications: ANXIETY WITH DEPRESSION, CHRONIC MUSCULOSKELETAL PAIN  Qty: 180 Cap, Refills: 3      roflumilast (DALIRESP) tab tablet Take 500 mcg by mouth daily. budesonide-formoterol (SYMBICORT) 160-4.5 mcg/actuation HFA inhaler Take 2 Puffs by inhalation two (2) times a day. Qty: 1 Inhaler, Refills: 11      ALPRAZolam (XANAX) 0.5 mg tablet 1/2 to 1 tablet three times daily, as needed; can take 1 1/2 or two at bedtime  Qty: 60 Tab, Refills: 5      omeprazole (PRILOSEC) 40 mg capsule Take 1 Cap by mouth daily. Take one hour before supper on an empty stomach  Qty: 90 Cap, Refills: 3      amLODIPine (NORVASC) 5 mg tablet Take 1 Tab by mouth nightly.  Indications: Hypertension  Qty: 90 Tab, Refills: 3      levothyroxine (SYNTHROID) 100 mcg tablet Take 1 Tab by mouth Daily (before breakfast). Indications: take dos  Qty: 90 Tab, Refills: 3      montelukast (SINGULAIR) 10 mg tablet Take 1 Tab by mouth nightly. Indications: ASTHMA PREVENTION  Qty: 90 Tab, Refills: 3      cetirizine (ZYRTEC) 10 mg tablet Take 10 mg by mouth daily. B.infantis-B.ani-B.long-B.bifi (PROBIOTIC 4X) 10-15 mg TbEC Take 1 Tab by mouth daily (with lunch). aspirin delayed-release 81 mg tablet Take 81 mg by mouth every morning. Indications: MYOCARDIAL INFARCTION PREVENTION, has not had since SCS trial 11/9/15         STOP taking these medications       carisoprodol (SOMA) 350 mg tablet Comments:   Reason for Stopping: Followup/Outpt Studies:  --[facility] in 2 weeks with CXR. --[facility] PT  --Total discharge greater than 30 minutes in duration. More than 50% of the time documented was spent in face-to-face contact with the patient and in the care of the patient on the floor/unit where the patient is located. Petty Kaminski NP     Lungs:  Decreased BS with a few scattered rhonchi  Heart:  RRR with no Murmur/Rubs/Gallops    Additional Comments:  OK for discharge. Needs HH. Does not need oxygen at discharge. I have spoken with and examined the patient. I agree with the above assessment and plan as documented.     Magali De La Garza MD

## 2017-05-28 ENCOUNTER — HOME HEALTH ADMISSION (OUTPATIENT)
Dept: HOME HEALTH SERVICES | Facility: HOME HEALTH | Age: 77
End: 2017-05-28
Payer: MEDICARE

## 2017-05-28 LAB
BACTERIA SPEC CULT: NORMAL
GRAM STN SPEC: NORMAL
SERVICE CMNT-IMP: NORMAL

## 2017-05-29 LAB
BACTERIA SPEC CULT: NORMAL
BACTERIA SPEC CULT: NORMAL
SERVICE CMNT-IMP: NORMAL
SERVICE CMNT-IMP: NORMAL

## 2017-05-30 ENCOUNTER — HOME CARE VISIT (OUTPATIENT)
Dept: SCHEDULING | Facility: HOME HEALTH | Age: 77
End: 2017-05-30
Payer: MEDICARE

## 2017-05-30 VITALS — DIASTOLIC BLOOD PRESSURE: 80 MMHG | SYSTOLIC BLOOD PRESSURE: 122 MMHG

## 2017-05-30 PROCEDURE — G0299 HHS/HOSPICE OF RN EA 15 MIN: HCPCS

## 2017-05-30 PROCEDURE — 3331090001 HH PPS REVENUE CREDIT

## 2017-05-30 PROCEDURE — 3331090002 HH PPS REVENUE DEBIT

## 2017-05-30 PROCEDURE — 400013 HH SOC

## 2017-05-31 PROCEDURE — 3331090002 HH PPS REVENUE DEBIT

## 2017-05-31 PROCEDURE — 3331090001 HH PPS REVENUE CREDIT

## 2017-06-01 PROCEDURE — 3331090002 HH PPS REVENUE DEBIT

## 2017-06-01 PROCEDURE — 3331090001 HH PPS REVENUE CREDIT

## 2017-06-02 ENCOUNTER — HOME CARE VISIT (OUTPATIENT)
Dept: SCHEDULING | Facility: HOME HEALTH | Age: 77
End: 2017-06-02
Payer: MEDICARE

## 2017-06-02 PROCEDURE — G0299 HHS/HOSPICE OF RN EA 15 MIN: HCPCS

## 2017-06-02 PROCEDURE — 3331090002 HH PPS REVENUE DEBIT

## 2017-06-02 PROCEDURE — 3331090001 HH PPS REVENUE CREDIT

## 2017-06-02 PROCEDURE — G0151 HHCP-SERV OF PT,EA 15 MIN: HCPCS

## 2017-06-03 VITALS
SYSTOLIC BLOOD PRESSURE: 152 MMHG | DIASTOLIC BLOOD PRESSURE: 68 MMHG | TEMPERATURE: 97.7 F | HEART RATE: 70 BPM | RESPIRATION RATE: 18 BRPM | OXYGEN SATURATION: 99 %

## 2017-06-03 PROCEDURE — 3331090001 HH PPS REVENUE CREDIT

## 2017-06-03 PROCEDURE — 3331090002 HH PPS REVENUE DEBIT

## 2017-06-04 VITALS
RESPIRATION RATE: 20 BRPM | OXYGEN SATURATION: 99 % | SYSTOLIC BLOOD PRESSURE: 152 MMHG | HEART RATE: 70 BPM | DIASTOLIC BLOOD PRESSURE: 68 MMHG | TEMPERATURE: 97.7 F

## 2017-06-04 PROCEDURE — 3331090002 HH PPS REVENUE DEBIT

## 2017-06-04 PROCEDURE — 3331090001 HH PPS REVENUE CREDIT

## 2017-06-05 PROCEDURE — 3331090001 HH PPS REVENUE CREDIT

## 2017-06-05 PROCEDURE — 3331090002 HH PPS REVENUE DEBIT

## 2017-06-06 ENCOUNTER — HOME CARE VISIT (OUTPATIENT)
Dept: SCHEDULING | Facility: HOME HEALTH | Age: 77
End: 2017-06-06
Payer: MEDICARE

## 2017-06-06 PROCEDURE — G0299 HHS/HOSPICE OF RN EA 15 MIN: HCPCS

## 2017-06-06 PROCEDURE — 3331090001 HH PPS REVENUE CREDIT

## 2017-06-06 PROCEDURE — 3331090002 HH PPS REVENUE DEBIT

## 2017-06-07 PROCEDURE — 3331090001 HH PPS REVENUE CREDIT

## 2017-06-07 PROCEDURE — 3331090002 HH PPS REVENUE DEBIT

## 2017-06-08 ENCOUNTER — HOME CARE VISIT (OUTPATIENT)
Dept: SCHEDULING | Facility: HOME HEALTH | Age: 77
End: 2017-06-08
Payer: MEDICARE

## 2017-06-08 VITALS
DIASTOLIC BLOOD PRESSURE: 60 MMHG | SYSTOLIC BLOOD PRESSURE: 110 MMHG | TEMPERATURE: 99 F | OXYGEN SATURATION: 97 % | HEART RATE: 77 BPM | RESPIRATION RATE: 20 BRPM

## 2017-06-08 PROCEDURE — 3331090002 HH PPS REVENUE DEBIT

## 2017-06-08 PROCEDURE — G0299 HHS/HOSPICE OF RN EA 15 MIN: HCPCS

## 2017-06-08 PROCEDURE — 3331090001 HH PPS REVENUE CREDIT

## 2017-06-09 PROCEDURE — 3331090002 HH PPS REVENUE DEBIT

## 2017-06-09 PROCEDURE — 3331090001 HH PPS REVENUE CREDIT

## 2017-06-10 PROCEDURE — 3331090002 HH PPS REVENUE DEBIT

## 2017-06-10 PROCEDURE — 3331090001 HH PPS REVENUE CREDIT

## 2017-06-11 PROCEDURE — 3331090001 HH PPS REVENUE CREDIT

## 2017-06-11 PROCEDURE — 3331090002 HH PPS REVENUE DEBIT

## 2017-06-12 VITALS
SYSTOLIC BLOOD PRESSURE: 138 MMHG | DIASTOLIC BLOOD PRESSURE: 62 MMHG | HEART RATE: 78 BPM | RESPIRATION RATE: 18 BRPM | OXYGEN SATURATION: 98 %

## 2017-06-12 PROCEDURE — 3331090001 HH PPS REVENUE CREDIT

## 2017-06-12 PROCEDURE — 3331090002 HH PPS REVENUE DEBIT

## 2017-06-13 PROCEDURE — 3331090002 HH PPS REVENUE DEBIT

## 2017-06-13 PROCEDURE — 3331090001 HH PPS REVENUE CREDIT

## 2017-06-14 ENCOUNTER — HOSPITAL ENCOUNTER (OUTPATIENT)
Dept: GENERAL RADIOLOGY | Age: 77
Discharge: HOME | End: 2017-06-14
Payer: MEDICARE

## 2017-06-14 DIAGNOSIS — J01.90 ACUTE BACTERIAL SINUSITIS: ICD-10-CM

## 2017-06-14 DIAGNOSIS — B96.89 ACUTE BACTERIAL SINUSITIS: ICD-10-CM

## 2017-06-14 DIAGNOSIS — J40 BRONCHITIS: ICD-10-CM

## 2017-06-14 PROCEDURE — 71020 XR CHEST PA LAT: CPT

## 2017-06-14 PROCEDURE — 3331090001 HH PPS REVENUE CREDIT

## 2017-06-14 PROCEDURE — 3331090002 HH PPS REVENUE DEBIT

## 2017-06-15 ENCOUNTER — HOME CARE VISIT (OUTPATIENT)
Dept: SCHEDULING | Facility: HOME HEALTH | Age: 77
End: 2017-06-15
Payer: MEDICARE

## 2017-06-15 PROCEDURE — 3331090002 HH PPS REVENUE DEBIT

## 2017-06-15 PROCEDURE — 3331090001 HH PPS REVENUE CREDIT

## 2017-06-15 PROCEDURE — G0299 HHS/HOSPICE OF RN EA 15 MIN: HCPCS

## 2017-06-16 PROCEDURE — 3331090001 HH PPS REVENUE CREDIT

## 2017-06-16 PROCEDURE — 3331090002 HH PPS REVENUE DEBIT

## 2017-06-17 PROCEDURE — 3331090001 HH PPS REVENUE CREDIT

## 2017-06-17 PROCEDURE — 3331090002 HH PPS REVENUE DEBIT

## 2017-06-18 PROCEDURE — 3331090002 HH PPS REVENUE DEBIT

## 2017-06-18 PROCEDURE — 3331090001 HH PPS REVENUE CREDIT

## 2017-06-19 PROCEDURE — 3331090001 HH PPS REVENUE CREDIT

## 2017-06-19 PROCEDURE — 3331090002 HH PPS REVENUE DEBIT

## 2017-06-20 PROCEDURE — 3331090002 HH PPS REVENUE DEBIT

## 2017-06-20 PROCEDURE — 3331090001 HH PPS REVENUE CREDIT

## 2017-06-21 VITALS
SYSTOLIC BLOOD PRESSURE: 144 MMHG | OXYGEN SATURATION: 92 % | RESPIRATION RATE: 18 BRPM | HEART RATE: 74 BPM | DIASTOLIC BLOOD PRESSURE: 78 MMHG | TEMPERATURE: 97 F

## 2017-06-21 PROCEDURE — 3331090002 HH PPS REVENUE DEBIT

## 2017-06-21 PROCEDURE — 3331090001 HH PPS REVENUE CREDIT

## 2017-06-22 ENCOUNTER — HOME CARE VISIT (OUTPATIENT)
Dept: SCHEDULING | Facility: HOME HEALTH | Age: 77
End: 2017-06-22
Payer: MEDICARE

## 2017-06-22 VITALS — RESPIRATION RATE: 18 BRPM

## 2017-06-22 PROCEDURE — 3331090002 HH PPS REVENUE DEBIT

## 2017-06-22 PROCEDURE — 3331090001 HH PPS REVENUE CREDIT

## 2017-06-22 PROCEDURE — G0299 HHS/HOSPICE OF RN EA 15 MIN: HCPCS

## 2017-06-23 PROCEDURE — 3331090002 HH PPS REVENUE DEBIT

## 2017-06-23 PROCEDURE — 3331090001 HH PPS REVENUE CREDIT

## 2017-06-24 PROCEDURE — 3331090002 HH PPS REVENUE DEBIT

## 2017-06-24 PROCEDURE — 3331090001 HH PPS REVENUE CREDIT

## 2017-06-25 PROCEDURE — 3331090001 HH PPS REVENUE CREDIT

## 2017-06-25 PROCEDURE — 3331090002 HH PPS REVENUE DEBIT

## 2017-06-26 PROCEDURE — 3331090002 HH PPS REVENUE DEBIT

## 2017-06-26 PROCEDURE — 3331090001 HH PPS REVENUE CREDIT

## 2017-06-27 PROBLEM — E11.9 DIABETES MELLITUS, TYPE II (HCC): Chronic | Status: RESOLVED | Noted: 2017-05-27 | Resolved: 2017-06-27

## 2017-06-27 PROCEDURE — 3331090001 HH PPS REVENUE CREDIT

## 2017-06-27 PROCEDURE — 3331090002 HH PPS REVENUE DEBIT

## 2017-06-28 PROCEDURE — 3331090002 HH PPS REVENUE DEBIT

## 2017-06-28 PROCEDURE — 3331090001 HH PPS REVENUE CREDIT

## 2017-06-29 ENCOUNTER — HOME CARE VISIT (OUTPATIENT)
Dept: SCHEDULING | Facility: HOME HEALTH | Age: 77
End: 2017-06-29
Payer: MEDICARE

## 2017-06-29 PROCEDURE — 3331090002 HH PPS REVENUE DEBIT

## 2017-06-29 PROCEDURE — G0299 HHS/HOSPICE OF RN EA 15 MIN: HCPCS

## 2017-06-29 PROCEDURE — 3331090001 HH PPS REVENUE CREDIT

## 2017-06-30 PROCEDURE — 3331090001 HH PPS REVENUE CREDIT

## 2017-06-30 PROCEDURE — 3331090002 HH PPS REVENUE DEBIT

## 2017-07-01 PROCEDURE — 3331090002 HH PPS REVENUE DEBIT

## 2017-07-01 PROCEDURE — 3331090001 HH PPS REVENUE CREDIT

## 2017-07-02 VITALS
HEART RATE: 78 BPM | SYSTOLIC BLOOD PRESSURE: 138 MMHG | DIASTOLIC BLOOD PRESSURE: 68 MMHG | RESPIRATION RATE: 18 BRPM | OXYGEN SATURATION: 94 % | TEMPERATURE: 97.7 F

## 2017-07-02 PROCEDURE — 3331090002 HH PPS REVENUE DEBIT

## 2017-07-02 PROCEDURE — 3331090001 HH PPS REVENUE CREDIT

## 2017-07-03 PROCEDURE — 3331090001 HH PPS REVENUE CREDIT

## 2017-07-03 PROCEDURE — 3331090002 HH PPS REVENUE DEBIT

## 2017-07-04 PROCEDURE — 3331090002 HH PPS REVENUE DEBIT

## 2017-07-04 PROCEDURE — 3331090001 HH PPS REVENUE CREDIT

## 2017-08-21 PROBLEM — E26.9 HYPERALDOSTERONISM (HCC): Status: ACTIVE | Noted: 2017-08-21

## 2017-12-29 ENCOUNTER — HOSPITAL ENCOUNTER (OUTPATIENT)
Dept: CT IMAGING | Age: 77
Discharge: HOME | End: 2017-12-29
Attending: NURSE PRACTITIONER
Payer: MEDICARE

## 2017-12-29 DIAGNOSIS — J44.9 CHRONIC OBSTRUCTIVE PULMONARY DISEASE, UNSPECIFIED COPD TYPE (HCC): Chronic | ICD-10-CM

## 2017-12-29 DIAGNOSIS — J40 BRONCHITIS: ICD-10-CM

## 2017-12-29 DIAGNOSIS — S29.9XXD CHEST TRAUMA, SUBSEQUENT ENCOUNTER: ICD-10-CM

## 2017-12-29 PROCEDURE — 71250 CT THORAX DX C-: CPT

## 2018-01-09 PROBLEM — E11.21 TYPE 2 DIABETES MELLITUS WITH NEPHROPATHY (HCC): Status: ACTIVE | Noted: 2018-01-09

## 2018-01-09 PROBLEM — E11.40 TYPE 2 DIABETES MELLITUS WITH DIABETIC NEUROPATHY (HCC): Status: ACTIVE | Noted: 2018-01-09

## 2018-01-09 PROBLEM — F33.9 RECURRENT DEPRESSION (HCC): Status: ACTIVE | Noted: 2018-01-09

## 2018-02-23 PROBLEM — E11.3599 TYPE 2 DIABETES MELLITUS WITH PROLIFERATIVE RETINOPATHY (HCC): Status: ACTIVE | Noted: 2018-02-23

## 2018-09-18 PROBLEM — B96.89 ACUTE BACTERIAL SINUSITIS: Status: RESOLVED | Noted: 2017-05-24 | Resolved: 2018-09-18

## 2018-09-18 PROBLEM — F17.211 CIGARETTE NICOTINE DEPENDENCE IN REMISSION: Status: ACTIVE | Noted: 2018-09-18

## 2018-09-18 PROBLEM — J01.90 ACUTE BACTERIAL SINUSITIS: Status: RESOLVED | Noted: 2017-05-24 | Resolved: 2018-09-18

## 2018-10-02 ENCOUNTER — HOSPITAL ENCOUNTER (OUTPATIENT)
Dept: CT IMAGING | Age: 78
Discharge: HOME | End: 2018-10-02
Attending: FAMILY MEDICINE
Payer: MEDICARE

## 2018-10-02 VITALS — WEIGHT: 140 LBS | BODY MASS INDEX: 24.8 KG/M2 | HEIGHT: 63 IN

## 2018-10-02 DIAGNOSIS — R10.9 FLANK PAIN: ICD-10-CM

## 2018-10-02 DIAGNOSIS — R10.2 PELVIC PAIN: ICD-10-CM

## 2018-10-02 DIAGNOSIS — R10.9 ABDOMINAL PAIN: ICD-10-CM

## 2018-10-02 DIAGNOSIS — R11.0 NAUSEA: ICD-10-CM

## 2018-10-02 LAB — CREAT BLD-MCNC: 1.2 MG/DL (ref 0.8–1.5)

## 2018-10-02 PROCEDURE — 74011636320 HC RX REV CODE- 636/320

## 2018-10-02 PROCEDURE — 82565 ASSAY OF CREATININE: CPT

## 2018-10-02 PROCEDURE — 74177 CT ABD & PELVIS W/CONTRAST: CPT

## 2018-10-02 PROCEDURE — 74011000258 HC RX REV CODE- 258

## 2018-10-02 RX ORDER — SODIUM CHLORIDE 0.9 % (FLUSH) 0.9 %
10 SYRINGE (ML) INJECTION
Status: COMPLETED | OUTPATIENT
Start: 2018-10-02 | End: 2018-10-02

## 2018-10-02 RX ADMIN — SODIUM CHLORIDE 100 ML: 900 INJECTION, SOLUTION INTRAVENOUS at 16:14

## 2018-10-02 RX ADMIN — Medication 10 ML: at 16:14

## 2018-10-02 RX ADMIN — IOPAMIDOL 100 ML: 755 INJECTION, SOLUTION INTRAVENOUS at 16:14

## 2018-10-02 RX ADMIN — DIATRIZOATE MEGLUMINE AND DIATRIZOATE SODIUM 15 ML: 660; 100 LIQUID ORAL; RECTAL at 16:14

## 2019-09-19 PROBLEM — J43.2 CENTRILOBULAR EMPHYSEMA (HCC): Status: ACTIVE | Noted: 2019-09-19

## 2020-01-02 ENCOUNTER — APPOINTMENT (OUTPATIENT)
Dept: CT IMAGING | Age: 80
End: 2020-01-02
Attending: STUDENT IN AN ORGANIZED HEALTH CARE EDUCATION/TRAINING PROGRAM
Payer: MEDICARE

## 2020-01-02 ENCOUNTER — APPOINTMENT (OUTPATIENT)
Dept: GENERAL RADIOLOGY | Age: 80
End: 2020-01-02
Attending: EMERGENCY MEDICINE
Payer: MEDICARE

## 2020-01-02 ENCOUNTER — HOSPITAL ENCOUNTER (EMERGENCY)
Age: 80
Discharge: HOME | End: 2020-01-02
Attending: STUDENT IN AN ORGANIZED HEALTH CARE EDUCATION/TRAINING PROGRAM
Payer: MEDICARE

## 2020-01-02 VITALS
DIASTOLIC BLOOD PRESSURE: 60 MMHG | RESPIRATION RATE: 18 BRPM | HEIGHT: 63 IN | HEART RATE: 94 BPM | BODY MASS INDEX: 23.57 KG/M2 | WEIGHT: 133 LBS | SYSTOLIC BLOOD PRESSURE: 133 MMHG | TEMPERATURE: 97.7 F | OXYGEN SATURATION: 96 %

## 2020-01-02 DIAGNOSIS — J01.90 ACUTE SINUSITIS, RECURRENCE NOT SPECIFIED, UNSPECIFIED LOCATION: ICD-10-CM

## 2020-01-02 DIAGNOSIS — J44.1 COPD EXACERBATION (HCC): Primary | ICD-10-CM

## 2020-01-02 DIAGNOSIS — R42 VERTIGO: ICD-10-CM

## 2020-01-02 LAB
ALBUMIN SERPL-MCNC: 3.5 G/DL (ref 3.2–4.6)
ALBUMIN/GLOB SERPL: 0.9 {RATIO} (ref 1.2–3.5)
ALP SERPL-CCNC: 97 U/L (ref 50–136)
ALT SERPL-CCNC: 13 U/L (ref 12–65)
ANION GAP SERPL CALC-SCNC: 8 MMOL/L (ref 7–16)
AST SERPL-CCNC: 15 U/L (ref 15–37)
ATRIAL RATE: 85 BPM
BASOPHILS # BLD: 0.1 K/UL (ref 0–0.2)
BASOPHILS NFR BLD: 1 % (ref 0–2)
BILIRUB SERPL-MCNC: 0.4 MG/DL (ref 0.2–1.1)
BNP SERPL-MCNC: 262 PG/ML
BUN SERPL-MCNC: 10 MG/DL (ref 8–23)
CALCIUM SERPL-MCNC: 9.5 MG/DL (ref 8.3–10.4)
CALCULATED P AXIS, ECG09: 75 DEGREES
CALCULATED R AXIS, ECG10: 61 DEGREES
CALCULATED T AXIS, ECG11: 60 DEGREES
CHLORIDE SERPL-SCNC: 102 MMOL/L (ref 98–107)
CO2 SERPL-SCNC: 23 MMOL/L (ref 21–32)
CREAT SERPL-MCNC: 0.85 MG/DL (ref 0.6–1)
DIAGNOSIS, 93000: NORMAL
DIFFERENTIAL METHOD BLD: ABNORMAL
EOSINOPHIL # BLD: 0.2 K/UL (ref 0–0.8)
EOSINOPHIL NFR BLD: 2 % (ref 0.5–7.8)
ERYTHROCYTE [DISTWIDTH] IN BLOOD BY AUTOMATED COUNT: 13.5 % (ref 11.9–14.6)
FLUAV AG NPH QL IA: NEGATIVE
FLUBV AG NPH QL IA: NEGATIVE
GLOBULIN SER CALC-MCNC: 3.8 G/DL (ref 2.3–3.5)
GLUCOSE SERPL-MCNC: 122 MG/DL (ref 65–100)
HCT VFR BLD AUTO: 37.1 % (ref 35.8–46.3)
HGB BLD-MCNC: 12.4 G/DL (ref 11.7–15.4)
IMM GRANULOCYTES # BLD AUTO: 0.1 K/UL (ref 0–0.5)
IMM GRANULOCYTES NFR BLD AUTO: 1 % (ref 0–5)
LYMPHOCYTES # BLD: 2.5 K/UL (ref 0.5–4.6)
LYMPHOCYTES NFR BLD: 29 % (ref 13–44)
MCH RBC QN AUTO: 30.6 PG (ref 26.1–32.9)
MCHC RBC AUTO-ENTMCNC: 33.4 G/DL (ref 31.4–35)
MCV RBC AUTO: 91.6 FL (ref 79.6–97.8)
MONOCYTES # BLD: 0.6 K/UL (ref 0.1–1.3)
MONOCYTES NFR BLD: 7 % (ref 4–12)
NEUTS SEG # BLD: 5.3 K/UL (ref 1.7–8.2)
NEUTS SEG NFR BLD: 61 % (ref 43–78)
NRBC # BLD: 0 K/UL (ref 0–0.2)
P-R INTERVAL, ECG05: 138 MS
PLATELET # BLD AUTO: 336 K/UL (ref 150–450)
PMV BLD AUTO: 9.3 FL (ref 9.4–12.3)
POTASSIUM SERPL-SCNC: 3.8 MMOL/L (ref 3.5–5.1)
PROT SERPL-MCNC: 7.3 G/DL (ref 6.3–8.2)
Q-T INTERVAL, ECG07: 344 MS
QRS DURATION, ECG06: 80 MS
QTC CALCULATION (BEZET), ECG08: 409 MS
RBC # BLD AUTO: 4.05 M/UL (ref 4.05–5.2)
SODIUM SERPL-SCNC: 133 MMOL/L (ref 136–145)
SPECIMEN SOURCE: NORMAL
VENTRICULAR RATE, ECG03: 85 BPM
WBC # BLD AUTO: 8.7 K/UL (ref 4.3–11.1)

## 2020-01-02 PROCEDURE — 96365 THER/PROPH/DIAG IV INF INIT: CPT | Performed by: STUDENT IN AN ORGANIZED HEALTH CARE EDUCATION/TRAINING PROGRAM

## 2020-01-02 PROCEDURE — 93005 ELECTROCARDIOGRAM TRACING: CPT | Performed by: STUDENT IN AN ORGANIZED HEALTH CARE EDUCATION/TRAINING PROGRAM

## 2020-01-02 PROCEDURE — 94640 AIRWAY INHALATION TREATMENT: CPT

## 2020-01-02 PROCEDURE — 94760 N-INVAS EAR/PLS OXIMETRY 1: CPT | Performed by: STUDENT IN AN ORGANIZED HEALTH CARE EDUCATION/TRAINING PROGRAM

## 2020-01-02 PROCEDURE — 70450 CT HEAD/BRAIN W/O DYE: CPT

## 2020-01-02 PROCEDURE — 74011250636 HC RX REV CODE- 250/636: Performed by: STUDENT IN AN ORGANIZED HEALTH CARE EDUCATION/TRAINING PROGRAM

## 2020-01-02 PROCEDURE — 74011000250 HC RX REV CODE- 250: Performed by: STUDENT IN AN ORGANIZED HEALTH CARE EDUCATION/TRAINING PROGRAM

## 2020-01-02 PROCEDURE — 83880 ASSAY OF NATRIURETIC PEPTIDE: CPT

## 2020-01-02 PROCEDURE — 87804 INFLUENZA ASSAY W/OPTIC: CPT

## 2020-01-02 PROCEDURE — 99285 EMERGENCY DEPT VISIT HI MDM: CPT | Performed by: STUDENT IN AN ORGANIZED HEALTH CARE EDUCATION/TRAINING PROGRAM

## 2020-01-02 PROCEDURE — 96375 TX/PRO/DX INJ NEW DRUG ADDON: CPT | Performed by: STUDENT IN AN ORGANIZED HEALTH CARE EDUCATION/TRAINING PROGRAM

## 2020-01-02 PROCEDURE — 85025 COMPLETE CBC W/AUTO DIFF WBC: CPT

## 2020-01-02 PROCEDURE — 71046 X-RAY EXAM CHEST 2 VIEWS: CPT

## 2020-01-02 PROCEDURE — 80053 COMPREHEN METABOLIC PANEL: CPT

## 2020-01-02 RX ORDER — PREDNISONE 20 MG/1
40 TABLET ORAL DAILY
Qty: 8 TAB | Refills: 0 | Status: SHIPPED | OUTPATIENT
Start: 2020-01-02 | End: 2020-01-06 | Stop reason: DRUGHIGH

## 2020-01-02 RX ORDER — IPRATROPIUM BROMIDE AND ALBUTEROL SULFATE 2.5; .5 MG/3ML; MG/3ML
3 SOLUTION RESPIRATORY (INHALATION)
Status: COMPLETED | OUTPATIENT
Start: 2020-01-02 | End: 2020-01-02

## 2020-01-02 RX ORDER — MECLIZINE HYDROCHLORIDE 25 MG/1
25 TABLET ORAL
Qty: 20 TAB | Refills: 1 | Status: SHIPPED | OUTPATIENT
Start: 2020-01-02

## 2020-01-02 RX ORDER — MAGNESIUM SULFATE HEPTAHYDRATE 40 MG/ML
2 INJECTION, SOLUTION INTRAVENOUS
Status: COMPLETED | OUTPATIENT
Start: 2020-01-02 | End: 2020-01-02

## 2020-01-02 RX ORDER — KETOROLAC TROMETHAMINE 30 MG/ML
15 INJECTION, SOLUTION INTRAMUSCULAR; INTRAVENOUS
Status: COMPLETED | OUTPATIENT
Start: 2020-01-02 | End: 2020-01-02

## 2020-01-02 RX ADMIN — KETOROLAC TROMETHAMINE 15 MG: 30 INJECTION, SOLUTION INTRAMUSCULAR; INTRAVENOUS at 18:51

## 2020-01-02 RX ADMIN — IPRATROPIUM BROMIDE AND ALBUTEROL SULFATE 3 ML: .5; 3 SOLUTION RESPIRATORY (INHALATION) at 18:38

## 2020-01-02 RX ADMIN — MAGNESIUM SULFATE HEPTAHYDRATE 2 G: 40 INJECTION, SOLUTION INTRAVENOUS at 18:36

## 2020-01-02 RX ADMIN — IPRATROPIUM BROMIDE AND ALBUTEROL SULFATE 3 ML: .5; 3 SOLUTION RESPIRATORY (INHALATION) at 20:48

## 2020-01-02 RX ADMIN — METHYLPREDNISOLONE SODIUM SUCCINATE 125 MG: 125 INJECTION, POWDER, FOR SOLUTION INTRAMUSCULAR; INTRAVENOUS at 18:36

## 2020-01-02 NOTE — ED TRIAGE NOTES
Pt arrives from PCP with continued shob and cough. Pt has been being treated for pneumonia for almost a month. Pt states cough with clear sputum. Family states pt has a fever of 97 degrees at home yesterday. NAD. EMs states PCP gave her breathing tx  Does not wear O2 normally.

## 2020-01-02 NOTE — ED PROVIDER NOTES
79-year-old female patient presents via EMS from local primary care provider's office with reports of shortness of breath and dizziness. Patient has been treated over the past month for a pneumonia. She is completed antibiotic therapy and states her cough, fevers and congestion have improved. She reports ongoing shortness of breath that is exacerbated with ambulation. She states when she becomes short of breath she experiences vertiginous type symptoms. She reports a distant history of vertigo and describes similar symptoms at this time. Dizziness is described as room spinning and her eyes jumping back-and-forth. She states this is intermittent and subsides completely between episodes. She denies any dizziness at this time. There is no significant visual change currently. Patient does report a dull headache and some neck discomfort with movement. No associated fever or chills. Denies any significant chest pain or pressure. She does experience some slight pressure when she ambulates and become short of breath. Patient has a history of COPD and emphysema. She states she received a steroid shot earlier today and received a course of steroids outpatient at the beginning of the month which she completed as prescribed.            Past Medical History:   Diagnosis Date    Acute bacterial sinusitis 5/24/2017    Adenoma of left adrenal gland     Allergic rhinitis     Arthritis     Asthma     Bronchiectasis     Cholelithiasis     Chronic back pain     Chronic obstructive pulmonary disease (COPD)     Chronic pain syndrome     Chronic renal insufficiency     Chronic restrictive lung disease     Congestive heart failure (CHF)     COPD exacerbation (HCC) 1/9/2015    Coronary artery disease     Depression with anxiety     Diarrhea     Dizziness     Ear problems     Gastroesophageal reflux disease     Glaucoma     Hyperkalemia     Hyperlipidemia      Hypertension     Macular degeneration     Neuropathy     Obstructive sleep apnea     Peripheral vascular disease     Primary hypothyroidism     Proteinuria     Pruritic disorder     Spinal stenosis     Stenosis of right carotid artery     Trigger thumb     Type 2 diabetes mellitus     Venous stasis        Past Surgical History:   Procedure Laterality Date    HX APPENDECTOMY      HX BACK SURGERY  2015    SCS trial    HX CAROTID ENDARTERECTOMY Left 2014    HX CAROTID ENDARTERECTOMY Right 2014    HX CATARACT REMOVAL Bilateral     HX CYST REMOVAL  2000    back    HX LUMBAR FUSION      HX MYRINGOTOMY Right     x2    HX HERNÁN AND BSO  1978         Family History:   Problem Relation Age of Onset    Diabetes Brother     Cancer Mother     Breast Cancer Mother     Heart Disease Father     Heart Attack Father     Cancer Sister         uterine       Social History     Socioeconomic History    Marital status:      Spouse name: Not on file    Number of children: Not on file    Years of education: Not on file    Highest education level: Not on file   Occupational History    Not on file   Social Needs    Financial resource strain: Not on file    Food insecurity:     Worry: Not on file     Inability: Not on file    Transportation needs:     Medical: Not on file     Non-medical: Not on file   Tobacco Use    Smoking status: Former Smoker     Packs/day: 1.00     Years: 30.00     Pack years: 30.00     Types: Cigarettes     Last attempt to quit: 2011     Years since quittin.0    Smokeless tobacco: Never Used    Tobacco comment: quit    Substance and Sexual Activity    Alcohol use: No     Alcohol/week: 0.0 standard drinks    Drug use: No    Sexual activity: Not on file   Lifestyle    Physical activity:     Days per week: Not on file     Minutes per session: Not on file    Stress: Not on file   Relationships    Social connections:     Talks on phone: Not on file     Gets together: Not on file Attends Bahai service: Not on file     Active member of club or organization: Not on file     Attends meetings of clubs or organizations: Not on file     Relationship status: Not on file    Intimate partner violence:     Fear of current or ex partner: Not on file     Emotionally abused: Not on file     Physically abused: Not on file     Forced sexual activity: Not on file   Other Topics Concern    Not on file   Social History Narrative    Lives alone, granddaughter lives next door. 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 ALLERGIES: Sulfa (sulfonamide antibiotics); Abilify [aripiprazole]; Chantix [varenicline]; Desyrel [trazodone]; Lipitor [atorvastatin]; Lisinopril; Nolamine; Other medication; Wellbutrin [bupropion]; and Zocor [simvastatin]    Review of Systems   Constitutional: Negative for chills, diaphoresis and fever. HENT: Negative for congestion, sneezing and sore throat. Eyes: Negative for visual disturbance. Respiratory: Positive for cough and shortness of breath. Negative for chest tightness and wheezing. Cardiovascular: Negative for chest pain and leg swelling. Gastrointestinal: Negative for abdominal pain, blood in stool, diarrhea, nausea and vomiting. Endocrine: Negative for polyuria. Genitourinary: Negative for difficulty urinating, dysuria, flank pain, hematuria and urgency. Musculoskeletal: Negative for back pain, myalgias, neck pain and neck stiffness. Skin: Negative for color change and rash. Neurological: Positive for dizziness. Negative for syncope, speech difficulty, weakness, light-headedness, numbness and headaches. Psychiatric/Behavioral: Negative for behavioral problems. All other systems reviewed and are negative.       Vitals:    01/02/20 1614   BP: 149/66   Pulse: 84   Resp: 16   Temp: 97.7 °F (36.5 °C)   SpO2: 99%   Weight: 60.3 kg (133 lb)   Height: 5' 3\" (1.6 m)            Physical Exam  Vitals signs and nursing note reviewed. Constitutional:       General: She is not in acute distress. Appearance: She is well-developed. She is not diaphoretic. Comments: Alert and oriented to person place and time. No acute distress, speaks in clear, fluid sentences. HENT:      Head: Normocephalic and atraumatic. Right Ear: External ear normal.      Left Ear: External ear normal.      Nose: Nose normal.   Eyes:      Pupils: Pupils are equal, round, and reactive to light. Neck:      Musculoskeletal: Normal range of motion. Cardiovascular:      Rate and Rhythm: Normal rate and regular rhythm. Heart sounds: Normal heart sounds. No murmur. No friction rub. No gallop. Pulmonary:      Effort: Pulmonary effort is normal. No respiratory distress. Breath sounds: No stridor. Wheezing present. No decreased breath sounds, rhonchi or rales. Comments: Diffuse end expiratory wheezing bilaterally. Coughing intermittently during exam.  Chest:      Chest wall: No tenderness. Abdominal:      General: There is no distension. Palpations: Abdomen is soft. There is no mass. Tenderness: There is no tenderness. There is no guarding or rebound. Hernia: No hernia is present. Musculoskeletal: Normal range of motion. General: No tenderness or deformity. Skin:     General: Skin is warm and dry. Neurological:      Mental Status: She is alert and oriented to person, place, and time. GCS: GCS eye subscore is 4. GCS verbal subscore is 5. GCS motor subscore is 6. Cranial Nerves: No cranial nerve deficit. Comments: Appears neurologically intact, no obvious focal neuro deficits. No active dizziness.           MDM  Number of Diagnoses or Management Options  Acute sinusitis, recurrence not specified, unspecified location: new and does not require workup  COPD exacerbation ([address]): established and worsening  Vertigo: new and does not require workup  Diagnosis management comments: History of COPD/emphysema. Patient states she received some sort of shot this morning, suspects it was a steroid injection. Continues to wheeze at this time, orders placed for DuoNeb, steroids EKG and BNP. Initial lab values are unremarkable and chest x-ray shows no obvious pneumonia. 6:35 PM    EKG obtained on arrival shows a normal sinus rhythm with a rate of 85 beats a minute. No evidence of acute ischemic change. During my evaluation, patient is on room air with stable oxygen saturations of 99%. She is actively wheezing. We will reassess after DuoNeb therapy, steroids and magnesium. 6:47 PM    Patient feels better after her second breathing treatment. She was ambulated in this department and maintained oxygen saturations of 91% and above. She did become slightly tachycardic but this resolved with rest.  She has had several albuterol nebulizers which I attribute to her tachycardia. She has no pain in her chest and has audibly been wheezing since arrival.  She has a long history of COPD in the past and is recently gotten over a bout of pneumonia. I feel that her symptoms are most likely explained by COPD exacerbation. Discussed option for admission to the hospital with continued treatment versus attempted outpatient therapy. She wishes to be discharged home and states she has plenty of albuterol nebulizer liquid to utilize. We have discussed at length with patient and her family the process for continued treatment at home, the importance of steroid therapy and strict return precautions. She voices understanding and agreement with this plan. Voice dictation software was used during the making of this note. This software is not perfect and grammatical and other typographical errors may be present. This note has been proofread, but may still contain errors.   601 Doctor Brendon Chang [facility]; 1/2/2020 @9:48 PM   ===================================================================         Amount and/or Complexity of Data Reviewed  Clinical lab tests: ordered and reviewed  Tests in the radiology section of CPT®: ordered and reviewed  Tests in the medicine section of CPT®: reviewed and ordered  Independent visualization of images, tracings, or specimens: yes    Risk of Complications, Morbidity, and/or Mortality  Presenting problems: moderate  Diagnostic procedures: low  Management options: moderate    Patient Progress  Patient progress: stable         Procedures

## 2020-01-03 NOTE — ED NOTES
I have reviewed discharge instructions with the patient. The patient verbalized understanding. Patient left ED via Discharge Method: wheelchair to Home with daughter. Opportunity for questions and clarification provided. Patient given 2 scripts. To continue your aftercare when you leave the hospital, you may receive an automated call from our care team to check in on how you are doing. This is a free service and part of our promise to provide the best care and service to meet your aftercare needs.  If you have questions, or wish to unsubscribe from this service please call [phone]. Thank you for Choosing our [facility] Emergency Department.

## 2020-01-03 NOTE — ED NOTES
Ambulated pt , sats decreased from 95% on r.a to 91%, HR increased from 110 to 126. MD Magaña notified on findings.

## 2020-01-03 NOTE — DISCHARGE INSTRUCTIONS
Patient Education   Schedule breathing treatments every 4-6 hours as discussed for the next 24 hours. After that time if your wheezing and shortness of breath is improved you can space out these treatments to every 6-8 hours. Take the prednisone prescribed as directed. Arrange follow-up care with the lung specialist listed. Return immediately for worsening symptoms, concerns or questions. Meclizine has been prescribed to use as needed for vertigo type symptoms. This will also help with sinusitis. Chronic Obstructive Pulmonary Disease (COPD): Care Instructions  Your Care Instructions    Chronic obstructive pulmonary disease (COPD) is a general term for a group of lung diseases, including emphysema and chronic bronchitis. People with COPD have decreased airflow in and out of the lungs, which makes it hard to breathe. The airways also can get clogged with thick mucus. Cigarette smoking is a major cause of COPD. Although there is no cure for COPD, you can slow its progress. Following your treatment plan and taking care of yourself can help you feel better and live longer. Follow-up care is a key part of your treatment and safety. Be sure to make and go to all appointments, and call your doctor if you are having problems. It's also a good idea to know your test results and keep a list of the medicines you take. How can you care for yourself at home?   Staying healthy    · Do not smoke. This is the most important step you can take to prevent more damage to your lungs. If you need help quitting, talk to your doctor about stop-smoking programs and medicines. These can increase your chances of quitting for good.     · Avoid colds and flu. Get a pneumococcal vaccine shot. If you have had one before, ask your doctor whether you need a second dose. Get the flu vaccine every fall.  If you must be around people with colds or the flu, wash your hands often.     · Avoid secondhand smoke, air pollution, and high altitudes. Also avoid cold, dry air and hot, humid air. Stay at home with your windows closed when air pollution is bad.    Medicines and oxygen therapy    · Take your medicines exactly as prescribed. Call your doctor if you think you are having a problem with your medicine.     · You may be taking medicines such as:  ? Bronchodilators. These help open your airways and make breathing easier. Bronchodilators are either short-acting (work for 6 to 9 hours) or long-acting (work for 24 hours). You inhale most bronchodilators, so they start to act quickly. Always carry your quick-relief inhaler with you in case you need it while you are away from home. ? Corticosteroids (prednisone, budesonide). These reduce airway inflammation. They come in pill or inhaled form. You must take these medicines every day for them to work well.     · A spacer may help you get more inhaled medicine to your lungs. Ask your doctor or pharmacist if a spacer is right for you. If it is, ask how to use it properly.     · Do not take any vitamins, over-the-counter medicine, or herbal products without talking to your doctor first.     · If your doctor prescribed antibiotics, take them as directed. Do not stop taking them just because you feel better. You need to take the full course of antibiotics.     · Oxygen therapy boosts the amount of oxygen in your blood and helps you breathe easier. Use the flow rate your doctor has recommended, and do not change it without talking to your doctor first.   Activity    · Get regular exercise. Walking is an easy way to get exercise. Start out slowly, and walk a little more each day.     · Pay attention to your breathing.  You are exercising too hard if you cannot talk while you are exercising.     · Take short rest breaks when doing household chores and other activities.     · Learn breathing methods--such as breathing through pursed lips--to help you become less short of breath.     · If your doctor has not set you up with a pulmonary rehabilitation program, talk to him or her about whether rehab is right for you. Rehab includes exercise programs, education about your disease and how to manage it, help with diet and other changes, and emotional support. Diet    · Eat regular, healthy meals. Use bronchodilators about 1 hour before you eat to make it easier to eat. Eat several small meals instead of three large ones. Drink beverages at the end of the meal. Avoid foods that are hard to chew.     · Eat foods that contain protein so that you do not lose muscle mass.     · Talk with your doctor if you gain too much weight or if you lose weight without trying.    Mental health    · Talk to your family, friends, or a therapist about your feelings. It is normal to feel frightened, angry, hopeless, helpless, and even guilty. Talking openly about bad feelings can help you cope. If these feelings last, talk to your doctor. When should you call for help? Call 911 anytime you think you may need emergency care. For example, call if:    · You have severe trouble breathing.    Call your doctor now or seek immediate medical care if:    · You have new or worse trouble breathing.     · You cough up blood.     · You have a fever.    Watch closely for changes in your health, and be sure to contact your doctor if:    · You cough more deeply or more often, especially if you notice more mucus or a change in the color of your mucus.     · You have new or worse swelling in your legs or belly.     · You are not getting better as expected. Where can you learn more? Go to http://vamsi-hardik.info/. Maryellen Lucas in the search box to learn more about \"Chronic Obstructive Pulmonary Disease (COPD): Care Instructions. \"  Current as of: June 9, 2019  Content Version: 12.2  © 8488-9048 [employer], [employer].  Care instructions adapted under license by [employer] (which disclaims liability or warranty for this information). If you have questions about a medical condition or this instruction, always ask your healthcare professional. [address] any warranty or liability for your use of this information. Patient Education        COPD Exacerbation Plan: Care Instructions  Your Care Instructions    If you have chronic obstructive pulmonary disease (COPD), your usual shortness of breath could suddenly get worse. You may start coughing more and have more mucus. This flare-up is called a COPD exacerbation (say \"og-DUN-ri-BAY-laney\"). A lung infection or air pollution could set off an exacerbation. Sometimes it can happen after a quick change in temperature or being around chemicals. Work with your doctor to make a plan for dealing with an exacerbation. You can better manage it if you plan ahead. Follow-up care is a key part of your treatment and safety. Be sure to make and go to all appointments, and call your doctor if you are having problems. It's also a good idea to know your test results and keep a list of the medicines you take. How can you care for yourself at home? During an exacerbation  · Do not panic if you start to have one. Quick treatment at home may help you prevent serious breathing problems. If you have a COPD exacerbation plan that you developed with your doctor, follow it. · Take your medicines exactly as your doctor tells you.  ? Use your inhaler as directed by your doctor. If your symptoms do not get better after you use your medicine, have someone take you to the emergency room. Call an ambulance if necessary. ? With inhaled medicines, a spacer or a nebulizer may help you get more medicine to your lungs. Ask your doctor or pharmacist how to use them properly. Practice using the spacer in front of a mirror before you have an exacerbation. This may help you get the medicine into your lungs quickly.   ? If your doctor has given you steroid pills, take them as directed. ? Your doctor may have given you a prescription for antibiotics, which you can fill if you need it. ? Talk to your doctor if you have any problems with your medicine. And call your doctor if you have to use your antibiotic or steroid pills. Preventing an exacerbation  · Do not smoke. This is the most important step you can take to prevent more damage to your lungs and prevent problems. If you already smoke, it is never too late to stop. If you need help quitting, talk to your doctor about stop-smoking programs and medicines. These can increase your chances of quitting for good. · Take your daily medicines as prescribed. · Avoid colds and flu. ? Get a pneumococcal vaccine. ? Get a flu vaccine each year, as soon as it is available. Ask those you live or work with to do the same, so they will not get the flu and infect you. ? Try to stay away from people with colds or the flu. ? Wash your hands often. · Avoid secondhand smoke; air pollution; cold, dry air; hot, humid air; and high altitudes. Stay at home with your windows closed when air pollution is bad. · Learn breathing techniques for COPD, such as breathing through pursed lips. These techniques can help you breathe easier during an exacerbation. When should you call for help? Call 911 anytime you think you may need emergency care.  For example, call if:    · You have severe trouble breathing.     · You have severe chest pain.    Call your doctor now or seek immediate medical care if:    · You have new or worse shortness of breath.     · You develop new chest pain.     · You are coughing more deeply or more often, especially if you notice more mucus or a change in the color of your mucus.     · You cough up blood.     · You have new or increased swelling in your legs or belly.     · You have a fever.    Watch closely for changes in your health, and be sure to contact your doctor if:    · You need to use your antibiotic or steroid pills.     · Your symptoms are getting worse. Where can you learn more? Go to http://vamsi-hardik.info/. Enter H675 in the search box to learn more about \"COPD Exacerbation Plan: Care Instructions. \"  Current as of: June 9, 2019  Content Version: 12.2  © 2672-5897 [employer], [employer]. Care instructions adapted under license by [employer] (which disclaims liability or warranty for this information). If you have questions about a medical condition or this instruction, always ask your healthcare professional. [address] any warranty or liability for your use of this information.

## 2020-05-01 ENCOUNTER — HOSPITAL ENCOUNTER (OUTPATIENT)
Dept: CT IMAGING | Age: 80
Discharge: HOME | End: 2020-05-01
Attending: NURSE PRACTITIONER

## 2020-05-01 DIAGNOSIS — R91.8 PULMONARY INFILTRATE: ICD-10-CM

## 2020-05-04 NOTE — PROGRESS NOTES
Patient was notified that pneumonia has resolved. She was appreciative of the call. She was encouraged to keep the appointment with Giacomo Flores on Wednesday.

## 2020-05-06 PROBLEM — J96.11 CHRONIC RESPIRATORY FAILURE WITH HYPOXIA (HCC): Status: ACTIVE | Noted: 2020-05-06

## 2020-05-06 PROBLEM — R91.8 PULMONARY INFILTRATE: Status: ACTIVE | Noted: 2020-05-06

## 2020-07-08 PROBLEM — G47.34 NOCTURNAL HYPOXIA: Status: ACTIVE | Noted: 2020-07-08

## 2021-02-12 ENCOUNTER — HOSPITAL ENCOUNTER (EMERGENCY)
Age: 81
Discharge: HOME | End: 2021-02-12
Attending: EMERGENCY MEDICINE
Payer: MEDICARE

## 2021-02-12 ENCOUNTER — APPOINTMENT (OUTPATIENT)
Dept: CT IMAGING | Age: 81
End: 2021-02-12
Attending: EMERGENCY MEDICINE
Payer: MEDICARE

## 2021-02-12 VITALS
RESPIRATION RATE: 20 BRPM | TEMPERATURE: 97.8 F | BODY MASS INDEX: 25.87 KG/M2 | WEIGHT: 146 LBS | SYSTOLIC BLOOD PRESSURE: 159 MMHG | HEART RATE: 78 BPM | OXYGEN SATURATION: 100 % | DIASTOLIC BLOOD PRESSURE: 63 MMHG | HEIGHT: 63 IN

## 2021-02-12 DIAGNOSIS — R10.84 ABDOMINAL PAIN, GENERALIZED: Primary | ICD-10-CM

## 2021-02-12 DIAGNOSIS — K80.20 CALCULUS OF GALLBLADDER WITHOUT CHOLECYSTITIS WITHOUT OBSTRUCTION: ICD-10-CM

## 2021-02-12 LAB
ALBUMIN SERPL-MCNC: 3.6 G/DL (ref 3.2–4.6)
ALBUMIN/GLOB SERPL: 0.9 {RATIO} (ref 1.2–3.5)
ALP SERPL-CCNC: 100 U/L (ref 50–136)
ALT SERPL-CCNC: 11 U/L (ref 12–65)
ANION GAP SERPL CALC-SCNC: 7 MMOL/L (ref 7–16)
AST SERPL-CCNC: 16 U/L (ref 15–37)
BASOPHILS # BLD: 0.1 K/UL (ref 0–0.2)
BASOPHILS NFR BLD: 1 % (ref 0–2)
BILIRUB SERPL-MCNC: 0.3 MG/DL (ref 0.2–1.1)
BUN SERPL-MCNC: 13 MG/DL (ref 8–23)
CALCIUM SERPL-MCNC: 9.9 MG/DL (ref 8.3–10.4)
CHLORIDE SERPL-SCNC: 104 MMOL/L (ref 98–107)
CO2 SERPL-SCNC: 28 MMOL/L (ref 21–32)
CREAT SERPL-MCNC: 1.02 MG/DL (ref 0.6–1)
DIFFERENTIAL METHOD BLD: ABNORMAL
EOSINOPHIL # BLD: 0.3 K/UL (ref 0–0.8)
EOSINOPHIL NFR BLD: 3 % (ref 0.5–7.8)
ERYTHROCYTE [DISTWIDTH] IN BLOOD BY AUTOMATED COUNT: 14.6 % (ref 11.9–14.6)
GLOBULIN SER CALC-MCNC: 4.2 G/DL (ref 2.3–3.5)
GLUCOSE SERPL-MCNC: 93 MG/DL (ref 65–100)
HCT VFR BLD AUTO: 36.9 % (ref 35.8–46.3)
HGB BLD-MCNC: 12.1 G/DL (ref 11.7–15.4)
IMM GRANULOCYTES # BLD AUTO: 0.1 K/UL (ref 0–0.5)
IMM GRANULOCYTES NFR BLD AUTO: 1 % (ref 0–5)
LIPASE SERPL-CCNC: 266 U/L (ref 73–393)
LYMPHOCYTES # BLD: 3.5 K/UL (ref 0.5–4.6)
LYMPHOCYTES NFR BLD: 38 % (ref 13–44)
MCH RBC QN AUTO: 29.7 PG (ref 26.1–32.9)
MCHC RBC AUTO-ENTMCNC: 32.8 G/DL (ref 31.4–35)
MCV RBC AUTO: 90.4 FL (ref 79.6–97.8)
MONOCYTES # BLD: 0.8 K/UL (ref 0.1–1.3)
MONOCYTES NFR BLD: 8 % (ref 4–12)
NEUTS SEG # BLD: 4.5 K/UL (ref 1.7–8.2)
NEUTS SEG NFR BLD: 49 % (ref 43–78)
NRBC # BLD: 0 K/UL (ref 0–0.2)
PLATELET # BLD AUTO: 370 K/UL (ref 150–450)
PMV BLD AUTO: 9.3 FL (ref 9.4–12.3)
POTASSIUM SERPL-SCNC: 4.1 MMOL/L (ref 3.5–5.1)
PROT SERPL-MCNC: 7.8 G/DL (ref 6.3–8.2)
RBC # BLD AUTO: 4.08 M/UL (ref 4.05–5.2)
SODIUM SERPL-SCNC: 139 MMOL/L (ref 136–145)
WBC # BLD AUTO: 9.2 K/UL (ref 4.3–11.1)

## 2021-02-12 PROCEDURE — 96374 THER/PROPH/DIAG INJ IV PUSH: CPT

## 2021-02-12 PROCEDURE — 74011000636 HC RX REV CODE- 636: Performed by: EMERGENCY MEDICINE

## 2021-02-12 PROCEDURE — 99284 EMERGENCY DEPT VISIT MOD MDM: CPT

## 2021-02-12 PROCEDURE — 83690 ASSAY OF LIPASE: CPT

## 2021-02-12 PROCEDURE — 74177 CT ABD & PELVIS W/CONTRAST: CPT

## 2021-02-12 PROCEDURE — 85025 COMPLETE CBC W/AUTO DIFF WBC: CPT

## 2021-02-12 PROCEDURE — 80053 COMPREHEN METABOLIC PANEL: CPT

## 2021-02-12 PROCEDURE — 74011000258 HC RX REV CODE- 258: Performed by: EMERGENCY MEDICINE

## 2021-02-12 PROCEDURE — 81003 URINALYSIS AUTO W/O SCOPE: CPT

## 2021-02-12 RX ORDER — SODIUM CHLORIDE 0.9 % (FLUSH) 0.9 %
10 SYRINGE (ML) INJECTION
Status: COMPLETED | OUTPATIENT
Start: 2021-02-12 | End: 2021-02-12

## 2021-02-12 RX ORDER — ONDANSETRON 2 MG/ML
4 INJECTION INTRAMUSCULAR; INTRAVENOUS
Status: DISCONTINUED | OUTPATIENT
Start: 2021-02-12 | End: 2021-02-12 | Stop reason: HOSPADM

## 2021-02-12 RX ORDER — ONDANSETRON 4 MG/1
4 TABLET, ORALLY DISINTEGRATING ORAL
Qty: 20 TAB | Refills: 0 | Status: SHIPPED | OUTPATIENT
Start: 2021-02-12

## 2021-02-12 RX ADMIN — IOPAMIDOL 100 ML: 755 INJECTION, SOLUTION INTRAVENOUS at 19:15

## 2021-02-12 RX ADMIN — Medication 10 ML: at 19:15

## 2021-02-12 RX ADMIN — SODIUM CHLORIDE 100 ML: 900 INJECTION, SOLUTION INTRAVENOUS at 19:15

## 2021-02-12 NOTE — ED TRIAGE NOTES
Pt ambulatory to triage. Pt states is having abd pain x 2 days with dark stools. Denies anticoag other that ASA. Pt tachypneic in triage. Denies SOB worse than normal with her COPD dx. Pt has hx of IBS and diverticulitis. Pt states abd is hurting all over. Pt denies O2 use at home during the day but states she uses it at night.

## 2021-02-12 NOTE — ED PROVIDER NOTES
25-year-old female with history of irritable bowel syndrome, hemorrhoids, cholelithiasis presents with sharp diffuse abdominal pain intermittently for the past 3 days. Pain worse with movement and eating. Pain improves with rest.  She reports nausea without vomiting. She has chronic diarrhea from IBS that resolved yesterday. No bowel movement today. No new blood in stools. She does have hemorrhoids with occasional hemorrhoidal bleeding. No fevers. Was seen in urgent care and sent to the emergency department for further work-up. Denies urinary symptoms. Had congestion last week and took Mucinex. No recent antibiotics. Abdominal Pain   Associated symptoms include diarrhea and nausea. Pertinent negatives include no fever, no vomiting, no dysuria, no headaches, no chest pain and no back pain.         Past Medical History:   Diagnosis Date    Acute bacterial sinusitis 5/24/2017    Adenoma of left adrenal gland     Allergic rhinitis     Arthritis     Asthma     Bronchiectasis     Cholelithiasis     Chronic back pain     Chronic obstructive pulmonary disease (COPD)     Chronic pain syndrome     Chronic renal insufficiency     Chronic restrictive lung disease     Congestive heart failure (CHF)     COPD exacerbation (HCC) 1/9/2015    Coronary artery disease     Depression with anxiety     Diarrhea     Dizziness     Ear problems     Gastroesophageal reflux disease     Glaucoma     Hyperkalemia     Hyperlipidemia      Hypertension     Macular degeneration     Neuropathy     Obstructive sleep apnea     Peripheral vascular disease     Primary hypothyroidism     Proteinuria     Pruritic disorder     Spinal stenosis     Stenosis of right carotid artery     Trigger thumb     Type 2 diabetes mellitus     Venous stasis        Past Surgical History:   Procedure Laterality Date    HX APPENDECTOMY      HX BACK SURGERY  11/09/2015    SCS trial    HX CAROTID ENDARTERECTOMY Left 01/2014  HX CAROTID ENDARTERECTOMY Right 2014    HX CATARACT REMOVAL Bilateral 2014    HX CYST REMOVAL  2000    back    HX LUMBAR FUSION      HX MYRINGOTOMY Right     x2    HX HERNÁN AND BSO  1978         Family History:   Problem Relation Age of Onset    Diabetes Brother     Cancer Mother     Breast Cancer Mother     Heart Disease Father     Heart Attack Father     Cancer Sister         uterine       Social History     Socioeconomic History    Marital status:      Spouse name: Not on file    Number of children: Not on file    Years of education: Not on file    Highest education level: Not on file   Occupational History    Not on file   Social Needs    Financial resource strain: Not on file    Food insecurity     Worry: Not on file     Inability: Not on file    Transportation needs     Medical: Not on file     Non-medical: Not on file   Tobacco Use    Smoking status: Former Smoker     Packs/day: 1.00     Years: 30.00     Pack years: 30.00     Types: Cigarettes     Quit date: 2011     Years since quittin.2    Smokeless tobacco: Never Used    Tobacco comment: quit    Substance and Sexual Activity    Alcohol use: No     Alcohol/week: 0.0 standard drinks    Drug use: No    Sexual activity: Not on file   Lifestyle    Physical activity     Days per week: Not on file     Minutes per session: Not on file    Stress: Not on file   Relationships    Social connections     Talks on phone: Not on file     Gets together: Not on file     Attends Faith service: Not on file     Active member of club or organization: Not on file     Attends meetings of clubs or organizations: Not on file     Relationship status: Not on file    Intimate partner violence     Fear of current or ex partner: Not on file     Emotionally abused: Not on file     Physically abused: Not on file     Forced sexual activity: Not on file   Other Topics Concern    Not on file   Social History Narrative    Lives alone, granddaughter lives next door. 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 ALLERGIES: Sulfa (sulfonamide antibiotics), Abilify [aripiprazole], Chantix [varenicline], Desyrel [trazodone], Lipitor [atorvastatin], Lisinopril, Nolamine, Other medication, Wellbutrin [bupropion], and Zocor [simvastatin]    Review of Systems   Constitutional: Negative for chills and fever. HENT: Negative for hearing loss. Eyes: Negative for visual disturbance. Respiratory: Negative for cough and shortness of breath. Cardiovascular: Negative for chest pain and palpitations. Gastrointestinal: Positive for abdominal pain, diarrhea and nausea. Negative for vomiting. Genitourinary: Negative for dysuria. Musculoskeletal: Negative for back pain. Skin: Negative for rash. Neurological: Negative for weakness and headaches. Psychiatric/Behavioral: Negative for confusion. Vitals:    02/12/21 1726   BP: (!) 165/63   Pulse: 89   Resp: 28   Temp: 97.8 °F (36.6 °C)   SpO2: 96%   Weight: 66.2 kg (146 lb)   Height: 5' 3\" (1.6 m)            Physical Exam  Vitals signs and nursing note reviewed. Constitutional:       Appearance: She is well-developed. HENT:      Head: Normocephalic and atraumatic. Eyes:      Pupils: Pupils are equal, round, and reactive to light. Neck:      Musculoskeletal: Normal range of motion and neck supple. Cardiovascular:      Rate and Rhythm: Regular rhythm. Heart sounds: Normal heart sounds. Pulmonary:      Effort: Pulmonary effort is normal.      Breath sounds: Normal breath sounds. Abdominal:      Palpations: Abdomen is soft. Tenderness: There is generalized abdominal tenderness. There is guarding. Musculoskeletal: Normal range of motion. Skin:     General: Skin is warm and dry. Neurological:      Mental Status: She is alert.    Psychiatric:         Behavior: Behavior normal.          MDM  Number of Diagnoses or Management Options  Diagnosis management comments: Parts of this document were created using dragon voice recognition software. The chart has been reviewed but errors may still be present. I wore appropriate PPE throughout this patient's ED visit. Meghan Meier MD, 6:45 PM    Significant diffuse abdominal tenderness on exam.  Labs unremarkable. CT ordered. Lipase pending. Does not want anything for pain at this time. 7:51 PM  Urine neg. No acute findings on CT. Distended GB and stone without elevated LFT's, fever, or leukocytosis. Will refer to gen surgery. No current pain. Given return precautions. I discussed the results of all labs, procedures, radiographs, and treatments with the patient and available family. Treatment plan is agreed upon and the patient is ready for discharge. Questions about treatment in the ED and differential diagnosis of presenting condition were answered. Patient was given verbal discharge instructions including, but not limited to, importance of returning to the emergency department for any concern of worsening or continued symptoms. Instructions were given to follow up with a primary care provider or specialist within 1-2 days. Adverse effects of medications, if prescribed, were discussed and patient was advised to refrain from significant physical activity until followed up by primary care physician and to not drive or operate heavy machinery after taking any sedating substances.            Amount and/or Complexity of Data Reviewed  Clinical lab tests: ordered and reviewed (Results for orders placed or performed during the hospital encounter of 02/12/21  -CBC WITH AUTOMATED DIFF       Result                      Value             Ref Range           WBC                         9.2               4.3 - 11.1 K*       RBC                         4.08              4.05 - 5.2 M*       HGB                         12.1              11.7 - 15.4 *       HCT                         36.9 35.8 - 46.3 %       MCV                         90.4              79.6 - 97.8 *       MCH                         29.7              26.1 - 32.9 *       MCHC                        32.8              31.4 - 35.0 *       RDW                         14.6              11.9 - 14.6 %       PLATELET                    370               150 - 450 K/*       MPV                         9.3 (L)           9.4 - 12.3 FL       ABSOLUTE NRBC               0.00              0.0 - 0.2 K/*       DF                          AUTOMATED                             NEUTROPHILS                 49                43 - 78 %           LYMPHOCYTES                 38                13 - 44 %           MONOCYTES                   8                 4.0 - 12.0 %        EOSINOPHILS                 3                 0.5 - 7.8 %         BASOPHILS                   1                 0.0 - 2.0 %         IMMATURE GRANULOCYTES       1                 0.0 - 5.0 %         ABS. NEUTROPHILS            4.5               1.7 - 8.2 K/*       ABS. LYMPHOCYTES            3.5               0.5 - 4.6 K/*       ABS. MONOCYTES              0.8               0.1 - 1.3 K/*       ABS. EOSINOPHILS            0.3               0.0 - 0.8 K/*       ABS. BASOPHILS              0.1               0.0 - 0.2 K/*       ABS. IMM.  GRANS.            0.1               0.0 - 0.5 K/*  -METABOLIC PANEL, COMPREHENSIVE       Result                      Value             Ref Range           Sodium                      139               136 - 145 mm*       Potassium                   4.1               3.5 - 5.1 mm*       Chloride                    104               98 - 107 mmo*       CO2                         28                21 - 32 mmol*       Anion gap                   7                 7 - 16 mmol/L       Glucose                     93                65 - 100 mg/*       BUN                         13                8 - 23 MG/DL        Creatinine                  1.02 (H) 0.6 - 1.0 MG*       GFR est AA                  >60               >60 ml/min/1*       GFR est non-AA              55 (L)            >60 ml/min/1*       Calcium                     9.9               8.3 - 10.4 M*       Bilirubin, total            0.3               0.2 - 1.1 MG*       ALT (SGPT)                  11 (L)            12 - 65 U/L         AST (SGOT)                  16                15 - 37 U/L         Alk. phosphatase            100               50 - 136 U/L        Protein, total              7.8               6.3 - 8.2 g/*       Albumin                     3.6               3.2 - 4.6 g/*       Globulin                    4.2 (H)           2.3 - 3.5 g/*       A-G Ratio                   0.9 (L)           1.2 - 3.5      -LIPASE       Result                      Value             Ref Range           Lipase                      266               73 - 393 U/L   )  Tests in the radiology section of CPT®: ordered and reviewed (Ct Abd Pelv W Cont    Result Date: 2/12/2021  Exam: CT ABD PELV W CONT on 2/12/2021 7:30 PM Clinical History: The Female patient is 79years old  presenting for abd pain. Technique: Thin slice axial images were obtained through the abdomen and pelvis with intravenous and without oral contrast.  Coronal reformatted images were also provided for review. A total of 100 ml of Iopamidol ([id number]) 76 % contrast was administered intravenously. All CT scans at this facility are performed using dose reduction/dose modulation techniques, as appropriate the performed exam, including the following: Automated Exposure Control; Adjustment of the mA and/or kV according to patient size (this includes techniques or standardized protocols for targeted exams where dose is matched to indication/reason for exam); and Use of Iterative Reconstruction Technique. Radiation Exposure Indices: Reference Air Kerma (Ka,r) = 484 mGy-cm COMPARISON:  Abdomen pelvis CT 10/2/2018.  FINDINGS:  Abdomen: Lung bases: Clear without evidence of focal infiltrate, consolidation, or effusion. Liver: Normal size, contour, density and enhancement without focal mass. There is a stable probable small hemangioma in the right liver lobe Biliary: Gallbladder is markedly distended and contains at least one laminated stone. There is however no evidence of intrahepatic biliary dilatation for age. Pancreas: Normal in size and contour without focal lesion. Spleen: Normal in size and contour without focal lesion. Ossified granuloma. Adrenal glands: Normal in size without focal lesion. Kidneys: Symmetric without evidence of hydronephrosis or nephrolithiasis. Normal enhancement throughout all visualized phases of contrast excretion. Bowel: No evidence of obstruction or focal lesion. Descending and sigmoid colonic diverticula without evidence of acute diverticulitis Retroperitoneum/vasculature: No evidence of significant adenopathy. Mild aortoiliac atherosclerotic disease without evidence of aneurysmal dilatation. Abdominal soft tissues: Unremarkable. Osseous structures: No acute osseous abnormality. Pelvis: Unremarkable bladder. No significant adenopathy or free fluid. Ebonie Money Hysterectomy     1. Distended gallbladder with at least single laminated stone. 2. Descending and sigmoid colonic diverticula without evidence of acute diverticulitis. 3. Hysterectomy changes.  CPT code(s): A4386153, [id number]    )  Tests in the medicine section of CPT®: reviewed and ordered           Procedures

## 2021-02-13 NOTE — DISCHARGE INSTRUCTIONS
Follow-up with general surgery for further evaluation of gallstone and distended gallbladder. Low-fat diet. Return for worsening concerning symptoms such as continued pain, vomiting, fever.

## 2021-02-13 NOTE — ED NOTES
I have reviewed discharge instructions with the patient. The patient verbalized understanding. Patient left ED via Discharge Method: wheelchair to Home with daughter. Opportunity for questions and clarification provided. Patient given 1 scripts. To continue your aftercare when you leave the hospital, you may receive an automated call from our care team to check in on how you are doing. This is a free service and part of our promise to provide the best care and service to meet your aftercare needs.  If you have questions, or wish to unsubscribe from this service please call [phone]. Thank you for Choosing our [facility] Emergency Department.

## 2021-06-03 PROBLEM — K64.0 GRADE I HEMORRHOIDS: Status: ACTIVE | Noted: 2021-06-03

## 2022-03-18 PROBLEM — G47.34 NOCTURNAL HYPOXIA: Status: ACTIVE | Noted: 2020-07-08

## 2022-03-18 PROBLEM — J96.11 CHRONIC RESPIRATORY FAILURE WITH HYPOXIA (HCC): Status: ACTIVE | Noted: 2020-05-06

## 2022-03-19 PROBLEM — J43.2 CENTRILOBULAR EMPHYSEMA (HCC): Status: ACTIVE | Noted: 2019-09-19

## 2022-03-19 PROBLEM — E11.40 TYPE 2 DIABETES MELLITUS WITH DIABETIC NEUROPATHY (HCC): Status: ACTIVE | Noted: 2018-01-09

## 2022-03-19 PROBLEM — F33.9 RECURRENT DEPRESSION (HCC): Status: ACTIVE | Noted: 2018-01-09

## 2022-03-19 PROBLEM — E11.9 CONTROLLED TYPE 2 DIABETES MELLITUS WITHOUT COMPLICATION, WITHOUT LONG-TERM CURRENT USE OF INSULIN (HCC): Status: ACTIVE | Noted: 2017-05-27

## 2022-03-19 PROBLEM — E26.9 HYPERALDOSTERONISM (HCC): Status: ACTIVE | Noted: 2017-08-21

## 2022-03-19 PROBLEM — J40 BRONCHITIS: Status: ACTIVE | Noted: 2017-05-24

## 2022-03-20 PROBLEM — K64.0 GRADE I HEMORRHOIDS: Status: ACTIVE | Noted: 2021-06-03

## 2022-03-20 PROBLEM — E11.3599 TYPE 2 DIABETES MELLITUS WITH PROLIFERATIVE RETINOPATHY (HCC): Status: ACTIVE | Noted: 2018-02-23

## 2022-03-20 PROBLEM — E11.21 TYPE 2 DIABETES MELLITUS WITH NEPHROPATHY (HCC): Status: ACTIVE | Noted: 2018-01-09

## 2022-03-20 PROBLEM — F17.211 CIGARETTE NICOTINE DEPENDENCE IN REMISSION: Status: ACTIVE | Noted: 2018-09-18

## 2022-03-20 PROBLEM — R91.8 PULMONARY INFILTRATE: Status: ACTIVE | Noted: 2020-05-06

## 2022-07-21 ENCOUNTER — HOSPITAL ENCOUNTER (OUTPATIENT)
Dept: CT IMAGING | Age: 82
Discharge: HOME | End: 2022-07-24
Payer: MEDICARE

## 2022-07-21 DIAGNOSIS — R10.32 LLQ ABDOMINAL PAIN: ICD-10-CM

## 2022-07-21 LAB — CREAT BLD-MCNC: 1.01 MG/DL (ref 0.8–1.5)

## 2022-07-21 PROCEDURE — 74177 CT ABD & PELVIS W/CONTRAST: CPT

## 2022-07-21 PROCEDURE — 82565 ASSAY OF CREATININE: CPT

## 2022-07-21 PROCEDURE — 2580000003 HC RX 258: Performed by: FAMILY MEDICINE

## 2022-07-21 PROCEDURE — 6360000004 HC RX CONTRAST MEDICATION: Performed by: FAMILY MEDICINE

## 2022-07-21 RX ORDER — SODIUM CHLORIDE 0.9 % (FLUSH) 0.9 %
10 SYRINGE (ML) INJECTION
Status: COMPLETED | OUTPATIENT
Start: 2022-07-21 | End: 2022-07-21

## 2022-07-21 RX ORDER — 0.9 % SODIUM CHLORIDE 0.9 %
100 INTRAVENOUS SOLUTION INTRAVENOUS
Status: COMPLETED | OUTPATIENT
Start: 2022-07-21 | End: 2022-07-21

## 2022-07-21 RX ADMIN — SODIUM CHLORIDE 100 ML: 9 INJECTION, SOLUTION INTRAVENOUS at 10:14

## 2022-07-21 RX ADMIN — DIATRIZOATE MEGLUMINE AND DIATRIZOATE SODIUM 15 ML: 660; 100 LIQUID ORAL; RECTAL at 10:14

## 2022-07-21 RX ADMIN — SODIUM CHLORIDE, PRESERVATIVE FREE 10 ML: 5 INJECTION INTRAVENOUS at 10:14

## 2022-07-21 RX ADMIN — IOPAMIDOL 100 ML: 755 INJECTION, SOLUTION INTRAVENOUS at 10:11

## 2022-08-31 NOTE — PROGRESS NOTES
She is an 79-year-old female seen for follow-up of COPD, bronchiectasis, prior tobacco use. She has upper lobe predominant emphysema, was previously on Dulera and Spiriva Respimat but had difficulty manipulating inhalers and requested change to Trelegy that she saw advertised on television. She was able to use this medication effectively but there are issues with cost.  Was already in the donut hole and had concerns about continuing this medication along with Daliresp, although states that Elana Basil seems to have made a significant improvement in terms of decreasing exacerbation and need for steroids. She remains tobacco free. She does not meet criteria for screening CT. DIAGNOSTICS:       CPFT's 8/2015-flow volume loop reveals airway obstruction, no significant bronchodilator response, lung volumes were low normal.  Diffusion capacity was decreased, study is consistent with COPD. Spirometry 9/2017-moderate obstructive defect with decreased FVC. Chest CT 12/2017-stable right upper lobe groundglass nodule which has been stable dating back to February 2012. Improving atelectasis in bilateral lower lobes. Stable mild-moderate centrilobular emphysematous changes in the upper lobes. Spirometry 9/18/2018-moderate obstructive defect with decreased FVC. No significant interval change. Spirometry 3/21/2019-moderately severe restrictive defect with obstructive pattern on expiratory loop. Slight but statistically insignificant improvement in FVC and FEV1. Previous CPFT's demonstrated obstructive defect. CT of chest 2/4/[id number] for PE. Severe emphysema, increased interstitial markings bilaterally, bibasilar atelectasis or scarring. Probable acute superimposed interstitial pneumonitis and alveolitis. Chest CT 5/1/2020- upper lobe predominant centrilobular emphysema, no consolidation or infiltrate.   Lymph nodes normal.  O2 sat 5/6/2020 - at patient's home, RA rest 98%, heart rate 68 - 78;  RA w/ exertion 94%, heart rate 110  Spirometry 11/9/2020-moderate restrictive defect, technically limited study, no significant interval change in FVC and FEV1.

## 2022-09-02 ENCOUNTER — OFFICE VISIT (OUTPATIENT)
Dept: PULMONOLOGY | Age: 82
End: 2022-09-02
Payer: MEDICARE

## 2022-09-02 VITALS
HEIGHT: 63 IN | TEMPERATURE: 97.5 F | OXYGEN SATURATION: 97 % | SYSTOLIC BLOOD PRESSURE: 132 MMHG | WEIGHT: 134 LBS | DIASTOLIC BLOOD PRESSURE: 78 MMHG | HEART RATE: 93 BPM | BODY MASS INDEX: 23.74 KG/M2

## 2022-09-02 DIAGNOSIS — F17.211 CIGARETTE NICOTINE DEPENDENCE IN REMISSION: ICD-10-CM

## 2022-09-02 DIAGNOSIS — J43.2 CENTRILOBULAR EMPHYSEMA (HCC): ICD-10-CM

## 2022-09-02 DIAGNOSIS — J47.9 BRONCHIECTASIS WITHOUT COMPLICATION (HCC): ICD-10-CM

## 2022-09-02 DIAGNOSIS — J44.9 CHRONIC OBSTRUCTIVE PULMONARY DISEASE, UNSPECIFIED COPD TYPE (HCC): Primary | ICD-10-CM

## 2022-09-02 DIAGNOSIS — G47.34 NOCTURNAL HYPOXIA: ICD-10-CM

## 2022-09-02 DIAGNOSIS — G89.29 OTHER CHRONIC PAIN: ICD-10-CM

## 2022-09-02 LAB
EXPIRATORY TIME: NORMAL
FEF 25-75% %PRED-PRE: NORMAL
FEF 25-75% PRED: NORMAL
FEF 25-75%-PRE: NORMAL
FEV1 %PRED-PRE: 71 %
FEV1 PRED: NORMAL
FEV1/FVC %PRED-PRE: NORMAL
FEV1/FVC PRED: NORMAL
FEV1/FVC: 73 %
FEV1: 1.24 L
FVC %PRED-PRE: 72 %
FVC PRED: NORMAL
FVC: 1.7 L
PEF %PRED-PRE: NORMAL
PEF PRED: NORMAL
PEF-PRE: NORMAL

## 2022-09-02 PROCEDURE — 99214 OFFICE O/P EST MOD 30 MIN: CPT | Performed by: NURSE PRACTITIONER

## 2022-09-02 PROCEDURE — G8420 CALC BMI NORM PARAMETERS: HCPCS | Performed by: NURSE PRACTITIONER

## 2022-09-02 PROCEDURE — 3023F SPIROM DOC REV: CPT | Performed by: NURSE PRACTITIONER

## 2022-09-02 PROCEDURE — 94010 BREATHING CAPACITY TEST: CPT | Performed by: NURSE PRACTITIONER

## 2022-09-02 PROCEDURE — G8427 DOCREV CUR MEDS BY ELIG CLIN: HCPCS | Performed by: NURSE PRACTITIONER

## 2022-09-02 PROCEDURE — 1123F ACP DISCUSS/DSCN MKR DOCD: CPT | Performed by: NURSE PRACTITIONER

## 2022-09-02 PROCEDURE — G8399 PT W/DXA RESULTS DOCUMENT: HCPCS | Performed by: NURSE PRACTITIONER

## 2022-09-02 PROCEDURE — 1036F TOBACCO NON-USER: CPT | Performed by: NURSE PRACTITIONER

## 2022-09-02 PROCEDURE — 1090F PRES/ABSN URINE INCON ASSESS: CPT | Performed by: NURSE PRACTITIONER

## 2022-09-02 RX ORDER — GUAIFENESIN 400 MG/1
TABLET ORAL
COMMUNITY

## 2022-09-02 RX ORDER — MONTELUKAST SODIUM 10 MG/1
TABLET ORAL
COMMUNITY

## 2022-09-02 RX ORDER — TRAMADOL HYDROCHLORIDE 50 MG/1
TABLET ORAL
COMMUNITY
Start: 2022-06-01 | End: 2022-09-13 | Stop reason: SINTOL

## 2022-09-02 RX ORDER — LEVOCETIRIZINE DIHYDROCHLORIDE 5 MG/1
TABLET, FILM COATED ORAL NIGHTLY
COMMUNITY

## 2022-09-02 ASSESSMENT — PULMONARY FUNCTION TESTS
FEV1: 1.24
FEV1/FVC: 73
FEV1_PERCENT_PREDICTED_PRE: 71
FVC: 1.70
FVC_PERCENT_PREDICTED_PRE: 72

## 2022-09-02 ASSESSMENT — ENCOUNTER SYMPTOMS
CHEST TIGHTNESS: 0
BACK PAIN: 1
COUGH: 0
ABDOMINAL PAIN: 1
SHORTNESS OF BREATH: 1
WHEEZING: 0

## 2022-09-02 NOTE — PROGRESS NOTES
[address] [place]. [address], [address] [facility]  [phone]    Patient Name:  Ministerio Mckenna    YOB: 1940    Office Visit 9/2/2022      CHIEF COMPLAINT:      Chief Complaint   Patient presents with    COPD    Follow-up    Other     HYPOXIA         ASSESSMENT:   (Medical Decision Making)                                                                                                                                          Encounter Diagnoses   Name Primary? Chronic obstructive pulmonary disease, unspecified COPD type ([address] Yes    Centrilobular emphysema (HCC)     Cigarette nicotine dependence in remission     Bronchiectasis without complication (HCC)     Nocturnal hypoxia     Other chronic pain      She is stable symptomatically. Tachypnea increased with anxiety. She has been instructed in pursed lip breathing. Today's spirometry demonstrates mild restrictive defect, slight trend toward improvement. Prior CPFT's demonstrated airway obstruction, low lung volumes, decreased diffusion capacity consistent with COPD. Severe emphysema noted on CT of chest.    She remains tobacco free. She does not meet criteria for screening CT. She is able to expectorate without difficulty. She is aware of measures to optimize mucus clearance. She is compliant with oxygen with sleep as prescribed. Has had occasional nosebleeds, now has humidity for O2 concentrator. Recommend OTC Migdalia gel, pharmacy    She reports that she has chronic pain, has had evaluation by pain management, received pain injections without relief, reports intolerance to various pain medications. She has questions regarding possible drug interaction with tramadol and other Rx. Recent abdominal CT for abdominal pain revealed no acute process. Encouraged her to review questions/concerns regarding RX with prescriber.                        PLAN:     Continue Trelegy inhaler every morning. Albuterol via nebulizer 4 times daily as needed. Continue oxygen as prescribed. Ayr gel to moisturize nasal mucosa up to 3 times daily, including before bedtime. Continue Daliresp as prescribed. OTC mucolytic, (mucinex or generic equivalent of guaifenesin), 2400mg in 24 hours in divided doses as needed to thin mucous. reviewed pursed lip breathing, advised to practice this breathing technique with exertion and if feeling anxious. After lengthy discussion regarding impact of chronic pain on her quality of life, along with significant exertional dyspnea, discussed palliative care referral, brochure provided. Advised to contact us if she desires referral.    Follow-up and Dispositions    Return in about 6 months (around 3/2/2023) for MD or Jesse, 40  min appt, COPD, hypoxia. Orders Placed This Encounter    Spirometry Without Bronchodilator     Follow-up and Dispositions    Return in about 6 months (around 3/2/2023) for MD or Jesse, 40  min appt, COPD, hypoxia. MARIAA Velázquez - CNP    Total  time spent with patient - 33 min. Collaborating MD: Dr. Faulkner Gamma:    She is an 43-year-old female seen for follow-up of COPD, bronchiectasis, prior tobacco use. She has upper lobe predominant emphysema, was previously on Dulera and Spiriva Respimat but had difficulty manipulating inhalers and requested change to Trelegy that she saw advertised on television. She was able to use this medication effectively but there are issues with cost.  Was already in the donut hole and had concerns about continuing this medication along with Daliresp, although states that Faisal Stout seems to have made a significant improvement in terms of decreasing exacerbation and need for steroids. She remains tobacco free. She does not meet criteria for screening CT.         DIAGNOSTICS:       CPFT's 8/2015-flow volume loop reveals airway obstruction, no significant bronchodilator response, lung volumes were low normal.  Diffusion capacity was decreased, study is consistent with COPD. Spirometry 9/2017-moderate obstructive defect with decreased FVC. Chest CT 12/2017-stable right upper lobe groundglass nodule which has been stable dating back to February 2012. Improving atelectasis in bilateral lower lobes. Stable mild-moderate centrilobular emphysematous changes in the upper lobes. Spirometry 9/18/2018-moderate obstructive defect with decreased FVC. No significant interval change. Spirometry 3/21/2019-moderately severe restrictive defect with obstructive pattern on expiratory loop. Slight but statistically insignificant improvement in FVC and FEV1. Previous CPFT's demonstrated obstructive defect. CT of chest 2/4/[id number] for PE. Severe emphysema, increased interstitial markings bilaterally, bibasilar atelectasis or scarring. Probable acute superimposed interstitial pneumonitis and alveolitis. Chest CT 5/1/2020- upper lobe predominant centrilobular emphysema, no consolidation or infiltrate. Lymph nodes normal.  O2 sat 5/6/2020 - at patient's home, RA rest 98%, heart rate 68 - 78;  RA w/ exertion 94%, heart rate 110  Spirometry 11/9/2020-moderate restrictive defect, technically limited study, no significant interval change in FVC and FEV1.  _____________________________________________________________      REVIEW OF SYSTEMS:    Review of Systems   Constitutional:  Positive for fatigue. Negative for chills, fever and unexpected weight change. HENT:  Positive for hearing loss. Respiratory:  Positive for shortness of breath. Negative for cough, chest tightness and wheezing. Cardiovascular:  Negative for chest pain, palpitations and leg swelling. Gastrointestinal:  Positive for abdominal pain. Musculoskeletal:  Positive for arthralgias and back pain.      PHYSICAL EXAM:    Vitals:    09/02/22 1106   BP: 132/78   Pulse: 93   Temp: 97.5 °F (36.4 °C)   TempSrc: Skin   SpO2: 97%   Weight: 134 lb (60.8 kg)   Height: 5' 3\" (1.6 m)        Body mass index is 23.74 kg/m². GENERAL APPEARANCE:  The patient is normal weight and in no respiratory distress. HEENT:  PERRL. Conjunctivae unremarkable. Nasal mucosa is without epistaxis, exudate, or polyps. Posterior oropharynx is moist, pink, no exudate or lesions. NECK/LYMPHATIC:   Symmetrical with no elevation of jugular venous pulsation. Trachea midline. No thyroid enlargement. No cervical adenopathy. LUNGS:   Normal respiratory effort with symmetrical lung expansion. Breath sounds - decreased but clear. HEART:   There is a normal rate and regular rhythm. No murmur, rub, or gallop. There is no edema in the lower extremities. ABDOMEN:  Soft and non-tender. No hepatosplenomegaly. Bowel sounds are normal.      NEURO:  The patient is alert and oriented to person, place, and time. Memory appears intact and mood is slightly anxious  No gross sensorimotor deficits are present. DIAGNOSTIC TESTS: Studies were personally reviewed by me and discussed with the patient.     Spirometry:    Office Spirometry Results Latest Ref Rng & Units 9/2/2022   FVC L 1.70   FEV1 L 1.24   FEV1 %PRED-PRE % 71   FVC %PRED-PRE % 72   FEV1/FVC % 73       Spirometry 11/2020:              Past Medical History:   Diagnosis Date    Acute bacterial sinusitis 5/24/2017    Adenoma of left adrenal gland     Allergic rhinitis     Arthritis     Asthma     Bronchiectasis (HCC)     Cholelithiasis     Chronic back pain     Chronic obstructive pulmonary disease (COPD) ([facility])     Chronic pain syndrome     Chronic renal insufficiency     Chronic restrictive lung disease     Congestive heart failure (CHF) ([facility])     COPD exacerbation ([facility] 1/9/2015    Coronary artery disease     Depression with anxiety     Diarrhea     Dizziness     Ear problems     Gastroesophageal reflux disease     Glaucoma     Hyperkalemia Hyperlipidemia      Hypertension     Macular degeneration     Neuropathy     Obstructive sleep apnea     Peripheral vascular disease (HCC)     Primary hypothyroidism     Proteinuria     Pruritic disorder     Spinal stenosis     Stenosis of right carotid artery     Trigger thumb     Type 2 diabetes mellitus (HCC)     Venous stasis        Patient Active Problem List   Diagnosis    CAD (coronary artery disease)    Hypertension    Macular degeneration    Chronic respiratory failure with hypoxia (HCC)    Nocturnal hypoxia    Pulmonary hypertension (HCC)    Proteinuria    Cholelithiasis    Depression    Chronic obstructive pulmonary disease (HCC)    Hyperaldosteronism (HCC)    Controlled type 2 diabetes mellitus without complication, without long-term current use of insulin (HCC)    Abnormal chest CT    Centrilobular emphysema (HCC)    Arthritis    Carotid artery stenosis without cerebral infarction    Bronchitis    Primary hypothyroidism    Carotid stenosis, right    Neuropathy    Menopausal disorder    PVD (peripheral vascular disease) (HCC)    Recurrent depression (HCC)    Leg pain    Chronic renal insufficiency    Congestive heart failure (CHF) ([address]    Hyperlipidemia    Left carotid stenosis    Bronchiectasis (HCC)    Spondylosis with myelopathy, lumbar region    Asthma    Chronic back pain    Obesity    Parotitis    BERNARD on CPAP    Chronic pain syndrome    Adenoma of left adrenal gland    S/p bilateral carotid endarterectomy    Pruritic disorder    Dyspnea    Hypoxemia    GERD (gastroesophageal reflux disease)    Type 2 diabetes mellitus with diabetic neuropathy (HCC)    Grade I hemorrhoids    Pulmonary nodule    Chronic restrictive lung disease    Type 2 diabetes mellitus with proliferative retinopathy (HCC)    Cigarette nicotine dependence in remission    Diverticulitis    Carotid artery disease ([address]    Pulmonary infiltrate    Anxiety    Type 2 diabetes mellitus with nephropathy ([address]       Past Surgical History: Procedure Laterality Date    APPENDECTOMY      BACK SURGERY  11/09/2015    [facility] trial    CAROTID ENDARTERECTOMY Right 04/2014    CAROTID ENDARTERECTOMY Left 01/2014    CATARACT REMOVAL Bilateral 2014    CYST REMOVAL  02/14/2000    back    LUMBAR FUSION      MYRINGOTOMY Right     x2    FRANCINE AND BSO (CERVIX REMOVED)  1978         Social History     Socioeconomic History    Marital status:      Spouse name: None    Number of children: None    Years of education: None    Highest education level: None   Tobacco Use    Smoking status: Former     Packs/day: 1.00     Years: 30.00     Pack years: 30.00     Types: Cigarettes     Quit date: 12/1/2011     Years since quitting: 10.7    Smokeless tobacco: Never    Tobacco comments:     Quit smoking: quit 2011   Substance and Sexual Activity    Alcohol use: No     Alcohol/week: 0.0 standard drinks    Drug use: No   Social History Narrative    Lives alone, granddaughter lives next door. 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 Family History   Problem Relation Age of Onset    Breast Cancer Mother     Cancer Sister         uterine    Heart Disease Father     Cancer Mother     Diabetes Brother     Heart Attack Father        Allergies   Allergen Reactions    Penicillins Rash    Sulfa Antibiotics Rash    Aripiprazole Other (See Comments)     Causes involuntary mouth movement     Atorvastatin Other (See Comments)     Legs hurt    Bupropion Other (See Comments)     Made her \"jerk\"    Lisinopril Other (See Comments)     Cough? ?    Simvastatin Other (See Comments)     Causes leg pain. Lf leg.      Trazodone Other (See Comments)     nightmares    Varenicline Nausea Only       Current Outpatient Medications   Medication Sig    montelukast (SINGULAIR) 10 MG tablet 1 tablet for allergies, lungs    guaiFENesin 400 MG tablet 1 tablet as needed    levocetirizine (XYZAL) 5 MG tablet levocetirizine 5 mg tablet    traMADol (ULTRAM) 50 MG tablet 2 tablets as needed for pain    acetaminophen (TYLENOL) 500 MG tablet Take 500 mg by mouth every 6 hours as needed    albuterol sulfate  (90 Base) MCG/ACT inhaler Inhale 2 puffs into the lungs every 4 hours as needed    albuterol (PROVENTIL) (2.5 MG/3ML) 0.083% nebulizer solution Inhale 2.5 mg into the lungs 4 times daily as needed    ALPRAZolam (XANAX) 0.5 MG tablet Take 0.5 mg by mouth. amLODIPine (NORVASC) 5 MG tablet Take 5 mg by mouth    aspirin 81 MG EC tablet Take 81 mg by mouth    DULoxetine (CYMBALTA) 60 MG extended release capsule Take 60 mg by mouth 2 times daily    fluticasone-umeclidin-vilant (TRELEGY ELLIPTA) 100-62.5-25 MCG/INH AEPB 1 inhalation daily, rinse mouth after use    levothyroxine (SYNTHROID) 100 MCG tablet Take 100 mcg by mouth every morning (before breakfast)    Roflumilast (DALIRESP) 500 MCG tablet 1 po every day; may decrease to every other day if tolerated    cetirizine (ZYRTEC) 10 MG tablet daily (Patient not taking: Reported on 9/2/2022)    doxycycline monohydrate (ADOXA) 100 MG tablet Take 100 mg by mouth 2 times daily (Patient not taking: Reported on 9/2/2022)    fluticasone (FLONASE) 50 MCG/ACT nasal spray Flonase Allergy Relief 50 mcg/actuation nasal spray,suspension Spray 2 sprays every day by intranasal route for 90 days.  For allergies and congestion (Patient not taking: Reported on 9/2/2022)    meclizine (ANTIVERT) 25 MG tablet Take 25 mg by mouth 3 times daily as needed (Patient not taking: Reported on 9/2/2022)    meloxicam (MOBIC) 7.5 MG tablet TAKE 1 TABLET BY MOUTH EVERY DAY FOR ARTHRITIS PAIN (Patient not taking: Reported on 9/2/2022)    omeprazole (PRILOSEC) 40 MG delayed release capsule Take 40 mg by mouth daily (Patient not taking: Reported on 9/2/2022)    ondansetron (ZOFRAN-ODT) 4 MG disintegrating tablet Take 4 mg by mouth every 8 hours as needed (Patient not taking: Reported on 9/2/2022)    oxybutynin (DITROPAN-XL) 5 MG extended release tablet Take 5 mg by mouth daily (Patient not taking: Reported on 9/2/2022)    predniSONE (DELTASONE) 20 MG tablet 2 tabs po qd x 3 days, 1 and 1/2 tabs po qd x 3 days, 1 tab po qd x 3 days, 1/2 tab po qd x 3 days, or as directed. (Patient not taking: Reported on 9/2/2022)    rOPINIRole (REQUIP) 0.25 MG tablet TAKE 1 TABLET BY MOUTH EVERY NIGHT AT BEDTIME (Patient not taking: Reported on 9/2/2022)     No current facility-administered medications for this visit. Over 50% of today's office visit was spent in face to face time reviewing test results, prognosis, importance of compliance, education about disease process, benefits of medications, instructions for management of acute symptoms, and follow up plans. Electronically signed. Dictated using voice recognition software.   Proof read but unrecognized errors may exist.

## 2022-09-06 ENCOUNTER — TELEPHONE (OUTPATIENT)
Dept: PULMONOLOGY | Age: 82
End: 2022-09-06

## 2022-09-06 DIAGNOSIS — E26.9 HYPERALDOSTERONISM (HCC): ICD-10-CM

## 2022-09-06 DIAGNOSIS — J44.9 CHRONIC OBSTRUCTIVE PULMONARY DISEASE, UNSPECIFIED COPD TYPE (HCC): ICD-10-CM

## 2022-09-06 DIAGNOSIS — K21.9 GASTROESOPHAGEAL REFLUX DISEASE, UNSPECIFIED WHETHER ESOPHAGITIS PRESENT: ICD-10-CM

## 2022-09-06 DIAGNOSIS — I25.10 CORONARY ARTERY DISEASE, UNSPECIFIED VESSEL OR LESION TYPE, UNSPECIFIED WHETHER ANGINA PRESENT, UNSPECIFIED WHETHER NATIVE OR TRANSPLANTED HEART: Primary | ICD-10-CM

## 2022-09-06 DIAGNOSIS — I10 HYPERTENSION, UNSPECIFIED TYPE: ICD-10-CM

## 2022-09-06 DIAGNOSIS — F41.9 ANXIETY: ICD-10-CM

## 2022-09-06 DIAGNOSIS — J47.9 BRONCHIECTASIS WITHOUT COMPLICATION (HCC): ICD-10-CM

## 2022-09-06 DIAGNOSIS — E11.9 CONTROLLED TYPE 2 DIABETES MELLITUS WITHOUT COMPLICATION, WITHOUT LONG-TERM CURRENT USE OF INSULIN (HCC): ICD-10-CM

## 2022-09-06 DIAGNOSIS — M47.16 SPONDYLOSIS WITH MYELOPATHY, LUMBAR REGION: ICD-10-CM

## 2022-09-06 DIAGNOSIS — Z99.89 OSA ON CPAP: ICD-10-CM

## 2022-09-06 DIAGNOSIS — I50.9 CONGESTIVE HEART FAILURE, UNSPECIFIED HF CHRONICITY, UNSPECIFIED HEART FAILURE TYPE (HCC): ICD-10-CM

## 2022-09-06 DIAGNOSIS — G47.33 OSA ON CPAP: ICD-10-CM

## 2022-09-06 DIAGNOSIS — I27.20 PULMONARY HYPERTENSION (HCC): ICD-10-CM

## 2022-09-06 NOTE — TELEPHONE ENCOUNTER
Last time patient was here Peter Villanueva mentioned Palliative Care to her. They are willing to start this.

## 2022-09-12 NOTE — TELEPHONE ENCOUNTER
Contacted patient's family (daughter) and discussed that referral was approved and just needs appt set up. Wants an afternoon appt after 2pm so that other daughter can attend appt after work. Scheduled for afternoon appt 9/13.

## 2022-09-13 ENCOUNTER — OFFICE VISIT (OUTPATIENT)
Dept: PULMONOLOGY | Age: 82
End: 2022-09-13
Payer: MEDICARE

## 2022-09-13 VITALS
WEIGHT: 134 LBS | HEART RATE: 89 BPM | OXYGEN SATURATION: 98 % | BODY MASS INDEX: 23.74 KG/M2 | SYSTOLIC BLOOD PRESSURE: 120 MMHG | DIASTOLIC BLOOD PRESSURE: 80 MMHG | TEMPERATURE: 97.6 F

## 2022-09-13 DIAGNOSIS — R06.00 DYSPNEA, UNSPECIFIED TYPE: ICD-10-CM

## 2022-09-13 DIAGNOSIS — J44.9 CHRONIC OBSTRUCTIVE PULMONARY DISEASE, UNSPECIFIED COPD TYPE (HCC): ICD-10-CM

## 2022-09-13 DIAGNOSIS — M47.16 SPONDYLOSIS WITH MYELOPATHY, LUMBAR REGION: ICD-10-CM

## 2022-09-13 DIAGNOSIS — G89.4 CHRONIC PAIN SYNDROME: Primary | ICD-10-CM

## 2022-09-13 DIAGNOSIS — Z51.5 PALLIATIVE CARE PATIENT: ICD-10-CM

## 2022-09-13 PROCEDURE — G8399 PT W/DXA RESULTS DOCUMENT: HCPCS | Performed by: NURSE PRACTITIONER

## 2022-09-13 PROCEDURE — 1090F PRES/ABSN URINE INCON ASSESS: CPT | Performed by: NURSE PRACTITIONER

## 2022-09-13 PROCEDURE — G8427 DOCREV CUR MEDS BY ELIG CLIN: HCPCS | Performed by: NURSE PRACTITIONER

## 2022-09-13 PROCEDURE — 99215 OFFICE O/P EST HI 40 MIN: CPT | Performed by: NURSE PRACTITIONER

## 2022-09-13 PROCEDURE — 3023F SPIROM DOC REV: CPT | Performed by: NURSE PRACTITIONER

## 2022-09-13 PROCEDURE — 1036F TOBACCO NON-USER: CPT | Performed by: NURSE PRACTITIONER

## 2022-09-13 PROCEDURE — G8420 CALC BMI NORM PARAMETERS: HCPCS | Performed by: NURSE PRACTITIONER

## 2022-09-13 PROCEDURE — 1123F ACP DISCUSS/DSCN MKR DOCD: CPT | Performed by: NURSE PRACTITIONER

## 2022-09-13 NOTE — PROGRESS NOTES
Tosha Bianchi (:  1940) is a 79 y.o. female,Established patient, here for evaluation of the following chief complaint(s):  Pain (Long standing back pain resulting in worsening debility. ) and Breathing Problem (Worsening by pain)       ASSESSMENT/PLAN:  1. Chronic pain syndrome - Reviewed history of pain medication with minimal to none used. The tramadol is making her very dizzy so she cannot use. She has been on duloxetine for 15+ years of pain/neuropathy with minimal help.  Aware suggests prior use earlier this year of Tylenol #3 - doesn't recall any significant side effects. Has used steroids before, but doesn't recall if it helped the chronic pain. She reports using CBD oil in very low dosing with some reported relief. We will initiate escalation of the dose as outlined in the bottle and check in 2 weeks to see of the impact. 2. Dyspnea, unspecified type - the patient describes this symptom as being associated solely with her chronic pain syndrome     3. Chronic obstructive pulmonary disease, unspecified COPD type ([address] - Known pulmonary disease with compliance of trelegy. 4. Spondylosis with myelopathy, lumbar region - describes long standing lower back pain. Prior spinal simulator that had to be taken out. 5. Palliative care patient - 1st encounter with palliative care. Wants to continue working toward some improvements. Return in about 2 weeks (around 2022) for Phone call then 4 weeks in-office visit. Subjective   SUBJECTIVE/OBJECTIVE:  HPI  Ms. Kandy Church is a pleasant 70-year-old female seen in palliative care for her first encounter. She is accompanied by her two daughters. She has known COPD, bronchiectasis, prior tobacco use. She has upper lobe predominant emphysema, was previously on Dulera and Spiriva Respimat but had difficulty manipulating inhalers and requested change to Trelegy that she saw advertised on television. She doesn't require oxygen at this point. slightly anxious  No gross sensorimotor deficits are present. On this date 9/13/2022 I have spent 50 minutes reviewing previous notes, test results and face to face with the patient discussing the diagnosis and importance of compliance with the treatment plan as well as documenting on the day of the visit. An electronic signature was used to authenticate this note.     --MARIAA Drake - CNP

## 2022-09-16 ENCOUNTER — TELEPHONE (OUTPATIENT)
Dept: PULMONOLOGY | Age: 82
End: 2022-09-16

## 2022-09-16 NOTE — TELEPHONE ENCOUNTER
TRIAGE CALL      Complaint: Coughing  Cough: yes  Productive:  yes  Bloody Sputum:  no  Increased SOB/Wheezing:  no  Duration: 4 days  Fever/Chills: no  OTC Meds tried: allergy pill/mucinex

## 2022-09-20 RX ORDER — DOXYCYCLINE HYCLATE 100 MG/1
100 CAPSULE ORAL 2 TIMES DAILY
Qty: 14 CAPSULE | Refills: 0 | Status: SHIPPED | OUTPATIENT
Start: 2022-09-20 | End: 2022-09-27

## 2022-09-20 NOTE — TELEPHONE ENCOUNTER
I have spoken with patient. She is aware of communication per Mrs Cassie Garg. She will increase nebulizer to QID until back to baseline. She reports that she is already taking Mucinex as directed by Mrs Cassie Garg. Doxycycline is sent to preferred pharmacy and she voices understanding of directions. She is aware that per Mrs Cassie Garg, patient can use OTC Coricidin. She is aware to call back if no improvement. Doxycycline 100mg po bid for 7 days, # 14, no refill. sent to preferred pharmacy

## 2022-09-20 NOTE — TELEPHONE ENCOUNTER
Continue Trelegy inhaler every morning. Albuterol via nebulizer 4 times daily as needed - should use qid now until improved and with future issues with cough, chest congestion and mucous clearance. OTC mucolytic, (mucinex or generic equivalent of guaifenesin), 2400mg in 24 hours in divided doses as needed to thin mucous. .    Doxycycline 100mg po bid for 7 days, # 14, no refill. Can use OTC coricidin decongestant per package directions.

## 2022-09-20 NOTE — TELEPHONE ENCOUNTER
Last seen: 9/13/22  Hx: chronic pain, dyspnea, COPD, palliative     Patient reporting more sputum than normal, has been intermittently green, c/o ears stopped up & some pain @ ears; no fevers; not used to using nebulizer much, but knows how; agrees to use at least TID; when asked about mucinex notes she normally takes this medication several times a day, but recently when she was feeling fine quit taking, attributes this change to causing a flare up. States she didn't know if she should call Ms. Yareli Mac or her family care doctor, doesn't seem to remember seeing palliative NP recently. Updated local pharmacy. Please advise if any recommendations for increased sputum & coughing.

## 2022-09-27 ENCOUNTER — SCHEDULED TELEPHONE ENCOUNTER (OUTPATIENT)
Dept: PULMONOLOGY | Age: 82
End: 2022-09-27
Payer: MEDICARE

## 2022-09-27 DIAGNOSIS — Z51.5 PALLIATIVE CARE PATIENT: ICD-10-CM

## 2022-09-27 DIAGNOSIS — J44.9 CHRONIC OBSTRUCTIVE PULMONARY DISEASE, UNSPECIFIED COPD TYPE (HCC): ICD-10-CM

## 2022-09-27 DIAGNOSIS — G89.4 CHRONIC PAIN SYNDROME: ICD-10-CM

## 2022-09-27 DIAGNOSIS — R10.13 EPIGASTRIC PAIN: Primary | ICD-10-CM

## 2022-09-27 DIAGNOSIS — K21.9 GASTROESOPHAGEAL REFLUX DISEASE, UNSPECIFIED WHETHER ESOPHAGITIS PRESENT: ICD-10-CM

## 2022-09-27 PROCEDURE — 99443 PR PHYS/QHP TELEPHONE EVALUATION 21-30 MIN: CPT | Performed by: NURSE PRACTITIONER

## 2022-09-27 RX ORDER — ACETAMINOPHEN AND CODEINE PHOSPHATE 300; 30 MG/1; MG/1
1 TABLET ORAL 2 TIMES DAILY
Qty: 60 TABLET | Refills: 0 | Status: SHIPPED | OUTPATIENT
Start: 2022-09-27 | End: 2022-10-18 | Stop reason: SDUPTHER

## 2022-09-27 RX ORDER — OMEPRAZOLE 20 MG/1
20 CAPSULE, DELAYED RELEASE ORAL
Qty: 180 CAPSULE | Refills: 1 | Status: SHIPPED | OUTPATIENT
Start: 2022-09-27

## 2022-09-27 RX ORDER — ALBUTEROL SULFATE 90 UG/1
2 AEROSOL, METERED RESPIRATORY (INHALATION) EVERY 4 HOURS PRN
Qty: 18 G | Refills: 11 | Status: SHIPPED | OUTPATIENT
Start: 2022-09-27 | End: 2022-10-18 | Stop reason: SDUPTHER

## 2022-09-27 RX ORDER — FLUTICASONE FUROATE, UMECLIDINIUM BROMIDE AND VILANTEROL TRIFENATATE 100; 62.5; 25 UG/1; UG/1; UG/1
1 POWDER RESPIRATORY (INHALATION) DAILY
Qty: 1 EACH | Refills: 11 | Status: SHIPPED | OUTPATIENT
Start: 2022-09-27

## 2022-09-27 ASSESSMENT — PATIENT HEALTH QUESTIONNAIRE - PHQ9
8. MOVING OR SPEAKING SO SLOWLY THAT OTHER PEOPLE COULD HAVE NOTICED. OR THE OPPOSITE, BEING SO FIGETY OR RESTLESS THAT YOU HAVE BEEN MOVING AROUND A LOT MORE THAN USUAL: 3
6. FEELING BAD ABOUT YOURSELF - OR THAT YOU ARE A FAILURE OR HAVE LET YOURSELF OR YOUR FAMILY DOWN: 1
5. POOR APPETITE OR OVEREATING: 3
2. FEELING DOWN, DEPRESSED OR HOPELESS: 3
7. TROUBLE CONCENTRATING ON THINGS, SUCH AS READING THE NEWSPAPER OR WATCHING TELEVISION: 3
SUM OF ALL RESPONSES TO PHQ QUESTIONS 1-9: 19
4. FEELING TIRED OR HAVING LITTLE ENERGY: 3
1. LITTLE INTEREST OR PLEASURE IN DOING THINGS: 3
SUM OF ALL RESPONSES TO PHQ QUESTIONS 1-9: 19
SUM OF ALL RESPONSES TO PHQ9 QUESTIONS 1 & 2: 6
9. THOUGHTS THAT YOU WOULD BE BETTER OFF DEAD, OR OF HURTING YOURSELF: 0
SUM OF ALL RESPONSES TO PHQ QUESTIONS 1-9: 19
3. TROUBLE FALLING OR STAYING ASLEEP: 0
SUM OF ALL RESPONSES TO PHQ QUESTIONS 1-9: 19

## 2022-09-27 NOTE — PROGRESS NOTES
Jamilah Michaels is a 79 y.o. female evaluated via telephone on 9/27/2022 for Pain and Abdominal Pain  . Assessment & Plan   1. Epigastric pain - Patient was placed on ATB therapy approximately 7 days ago and has now developed epigastric pain. Describes as   -     omeprazole (PRILOSEC) 20 MG delayed release capsule; Take 1 capsule by mouth 2 times daily (before meals), Disp-180 capsule, R-1Normal  -     acetaminophen-codeine (TYLENOL/CODEINE #3) 300-30 MG per tablet; Take 1 tablet by mouth in the morning and at bedtime for 30 days. Z51.5 Palliative Care, Disp-60 tablet, R-0Normal  2. Chronic obstructive pulmonary disease, unspecified COPD type ([facility] [address]  -     fluticasone-umeclidin-vilant ([place] [place] [address]) 100-62.5-25 MCG/INH AEPB; Inhale 1 puff into the lungs daily 1 inhalation daily, rinse mouth after use, Disp-1 each, R-11Normal  -     albuterol sulfate HFA (PROVENTIL;VENTOLIN;PROAIR) 108 (90 Base) MCG/ACT inhaler; Inhale 2 puffs into the lungs every 4 hours as needed for Wheezing or Shortness of Breath, Disp-18 g, R-11Normal  3. Gastroesophageal reflux disease, unspecified whether esophagitis present  -     omeprazole (PRILOSEC) 20 MG delayed release capsule; Take 1 capsule by mouth 2 times daily (before meals), Disp-180 capsule, R-1Normal  4. Chronic pain syndrome  -     acetaminophen-codeine (TYLENOL/CODEINE #3) 300-30 MG per tablet; Take 1 tablet by mouth in the morning and at bedtime for 30 days. Z51.5 Palliative Care, Disp-60 tablet, R-0Normal  5. Palliative care patient  -     acetaminophen-codeine (TYLENOL/CODEINE #3) 300-30 MG per tablet; Take 1 tablet by mouth in the morning and at bedtime for 30 days. Z51.5 Palliative Care, Disp-60 tablet, R-0Normal    Return in about 4 weeks (around 10/25/2022) for Please facility a video call instead of phone call. Subjective   Prior to Visit Medications    Medication Sig Taking?  Authorizing Provider   fluticasone-umeclidin-vilant ([place] [address]) 100-62.5-25 MCG/INH AEPB Inhale 1 puff into the lungs daily 1 inhalation daily, rinse mouth after use Yes MARIAA Milton CNP   albuterol sulfate HFA (PROVENTIL;VENTOLIN;PROAIR) 108 (90 Base) MCG/ACT inhaler Inhale 2 puffs into the lungs every 4 hours as needed for Wheezing or Shortness of Breath Yes MARIAA Milton CNP   omeprazole (PRILOSEC) 20 MG delayed release capsule Take 1 capsule by mouth 2 times daily (before meals) Yes MARIAA Milton CNP   acetaminophen-codeine (TYLENOL/CODEINE #3) 300-30 MG per tablet Take 1 tablet by mouth in the morning and at bedtime for 30 days. Z51.5 Palliative Care Yes MARIAA Milton CNP   doxycycline hyclate (VIBRAMYCIN) 100 MG capsule Take 1 capsule by mouth 2 times daily for 7 days Yes MARIAA Arguelles CNP   montelukast (SINGULAIR) 10 MG tablet 1 tablet for allergies, lungs Yes Historical Provider, MD   guaiFENesin 400 MG tablet 1 tablet as needed Yes Historical Provider, MD   levocetirizine (XYZAL) 5 MG tablet nightly Yes Historical Provider, MD   acetaminophen (TYLENOL) 500 MG tablet Take 500 mg by mouth every 6 hours as needed Yes Ar Automatic Reconciliation   albuterol (PROVENTIL) (2.5 MG/3ML) 0.083% nebulizer solution Inhale 2.5 mg into the lungs 4 times daily as needed Yes Ar Automatic Reconciliation   ALPRAZolam (XANAX) 0.5 MG tablet Take 0.5 mg by mouth nightly as needed.  Yes Ar Automatic Reconciliation   amLODIPine (NORVASC) 5 MG tablet Take 5 mg by mouth daily Yes Ar Automatic Reconciliation   aspirin 81 MG EC tablet Take 81 mg by mouth Yes Ar Automatic Reconciliation   DULoxetine (CYMBALTA) 60 MG extended release capsule Take 60 mg by mouth 2 times daily Yes Ar Automatic Reconciliation   levothyroxine (SYNTHROID) 100 MCG tablet Take 88 mcg by mouth every morning (before breakfast) Per patient taking 88 mcg tablet Yes Ar Automatic Reconciliation   Roflumilast (DALIRESP) 500 MCG tablet 1 po every day; may decrease to every other day if tolerated Yes Ar Automatic Reconciliation     Ms. Nacho Meza is a pleasant 19-year-old female seen in palliative care for her first encounter. She is accompanied by her two daughters. She has known COPD, bronchiectasis, prior tobacco use. She has upper lobe predominant emphysema, was previously on Dulera and Spiriva Respimat but had difficulty manipulating inhalers and requested change to Trelegy that she saw advertised on television. She doesn't require oxygen at this point. She remains tobacco free. Her goals include relief of pain and symptoms. She struggles with IBS diarrhea and does limit her activities. Review of Systems      Objective   Patient-Reported Vitals  Patient-Reported Systolic (Top): 0 mmHg (not able to check)  Patient-Reported Pulse: not able to check  Patient-Reported Temperature: not able to check  Patient-Reported Weight: not able to check weight  Patient-Reported Height: 5'3\"  Patient-Reported Pulse Oximetry: not able to check  Patient-Reported Peak Flow: not able to check         Total Time: minutes: 21-30 minutes     Go Andersen was evaluated through a synchronous (real-time) audio only encounter. Patient identification was verified at the start of the visit. She (or guardian if applicable) is aware that this is a billable service, which includes applicable co-pays. This visit was conducted with patient's (and/or legal guardian's) verbal consent. She has not had a related appointment within my department in the past 7 days or scheduled within the next 24 hours. The patient was located at Home: [address] [place] [address] [address]. The provider was located at [place] [address]): [address] [place] [address] Dr Juan Garcia [address] [place] [address],  [address] [facility].      Lilia Rosa, APRN - CNP

## 2022-10-18 ENCOUNTER — OFFICE VISIT (OUTPATIENT)
Dept: PULMONOLOGY | Age: 82
End: 2022-10-18
Payer: MEDICARE

## 2022-10-18 VITALS
DIASTOLIC BLOOD PRESSURE: 73 MMHG | OXYGEN SATURATION: 98 % | RESPIRATION RATE: 20 BRPM | TEMPERATURE: 97.2 F | BODY MASS INDEX: 23.21 KG/M2 | SYSTOLIC BLOOD PRESSURE: 145 MMHG | WEIGHT: 131 LBS | HEIGHT: 63 IN | HEART RATE: 90 BPM

## 2022-10-18 DIAGNOSIS — R53.81 DEBILITY: ICD-10-CM

## 2022-10-18 DIAGNOSIS — J44.9 CHRONIC OBSTRUCTIVE PULMONARY DISEASE, UNSPECIFIED COPD TYPE (HCC): ICD-10-CM

## 2022-10-18 DIAGNOSIS — Z51.5 PALLIATIVE CARE PATIENT: ICD-10-CM

## 2022-10-18 DIAGNOSIS — G89.4 CHRONIC PAIN SYNDROME: ICD-10-CM

## 2022-10-18 PROCEDURE — 1036F TOBACCO NON-USER: CPT | Performed by: NURSE PRACTITIONER

## 2022-10-18 PROCEDURE — 1123F ACP DISCUSS/DSCN MKR DOCD: CPT | Performed by: NURSE PRACTITIONER

## 2022-10-18 PROCEDURE — G8399 PT W/DXA RESULTS DOCUMENT: HCPCS | Performed by: NURSE PRACTITIONER

## 2022-10-18 PROCEDURE — G8484 FLU IMMUNIZE NO ADMIN: HCPCS | Performed by: NURSE PRACTITIONER

## 2022-10-18 PROCEDURE — 3023F SPIROM DOC REV: CPT | Performed by: NURSE PRACTITIONER

## 2022-10-18 PROCEDURE — 1090F PRES/ABSN URINE INCON ASSESS: CPT | Performed by: NURSE PRACTITIONER

## 2022-10-18 PROCEDURE — G8420 CALC BMI NORM PARAMETERS: HCPCS | Performed by: NURSE PRACTITIONER

## 2022-10-18 PROCEDURE — G8427 DOCREV CUR MEDS BY ELIG CLIN: HCPCS | Performed by: NURSE PRACTITIONER

## 2022-10-18 PROCEDURE — 99214 OFFICE O/P EST MOD 30 MIN: CPT | Performed by: NURSE PRACTITIONER

## 2022-10-18 RX ORDER — ACETAMINOPHEN AND CODEINE PHOSPHATE 300; 30 MG/1; MG/1
1 TABLET ORAL 2 TIMES DAILY
Qty: 60 TABLET | Refills: 3 | Status: SHIPPED | OUTPATIENT
Start: 2022-10-18 | End: 2022-11-17

## 2022-10-18 RX ORDER — ALBUTEROL SULFATE 90 UG/1
2 AEROSOL, METERED RESPIRATORY (INHALATION) EVERY 4 HOURS PRN
Qty: 3 EACH | Refills: 3 | Status: SHIPPED | OUTPATIENT
Start: 2022-10-18 | End: 2023-01-16

## 2022-10-18 ASSESSMENT — ENCOUNTER SYMPTOMS
WHEEZING: 0
ABDOMINAL PAIN: 1
CHEST TIGHTNESS: 0
COUGH: 1
STRIDOR: 0

## 2022-10-18 NOTE — PROGRESS NOTES
Varun Lin (:  1940) is a 79 y.o. female,Established patient, here for evaluation of the following chief complaint(s):  Chronic Pain         ASSESSMENT/PLAN:  1. Chronic pain syndrome - We've made some progress with the use of tylenol #3 with AM and PM dosing. She does need to move the PM dose up to around 3:30-4:00pm for pain control. She is sleeping well but having onset of pain again around 3-4pm. No reports of side effects from the tylenol #3. Advised to monitor total acetaminophen doses when using OTC medication. -     acetaminophen-codeine (TYLENOL/CODEINE #3) 300-30 MG per tablet; Take 1 tablet by mouth in the morning and at bedtime for 30 days. Z51.5 Palliative Care, Disp-60 tablet, R-3Normal    2. Chronic obstructive pulmonary disease, unspecified COPD type ([facility] [address] - Managing her COPD well at this point. Recognizes the progression of this condition and using her pulmonary medications/oxygen as advised. -     albuterol sulfate HFA (PROVENTIL;VENTOLIN;PROAIR) 108 (90 Base) MCG/ACT inhaler; Inhale 2 puffs into the lungs every 4 hours as needed for Wheezing or Shortness of Breath, Disp-3 each, R-3Normal    3. Debility - Aware of debility and some issues with declining health. Thoughts of traveling to [facility]) to visit with her sister in law. Encouraged to pursue and enjoy her time with family. Currently living with daughter and her family. 4. Palliative care patient - We continue to assist with symptom management with this patient. Refills provided. May escalate if needed to better control pain with activity. Currently pain free at rest.   -     acetaminophen-codeine (TYLENOL/CODEINE #3) 300-30 MG per tablet; Take 1 tablet by mouth in the morning and at bedtime for 30 days. Z51.5 Palliative Care, Disp-60 tablet, R-3Normal  Return in about 3 months (around 2023). Subjective   SUBJECTIVE/OBJECTIVE:  HPI  Ms. Augustina Delgado is a pleasant 79-year-old female seen in palliative care for ongoing support by the palliative care team. She is accompanied by her primary care daughter. She has known COPD, bronchiectasis, prior tobacco use. Her goals include relief of pain and symptoms. She struggles with IBS diarrhea and does limit her activities. Since the last visit, she has found the pain subsiding and does not have associated issues with side effects. No constipation issues. The IBS and diarrhea has improved slightly but still has some bouts. [facility] Symptom Assessment Scale (CMSAS)    How much did this symptom bother or distress you in the past 7 days? Symptom Score   Lack of energy* 4   Lack of appetite* 3   Pain* 4   Dry mouth* 4   Weight Loss* 1   Feeling drowsy* 3   Shortness of breath* 3   Constipation* 0   Difficulty sleeping* 3   Difficulty concentrating 4   Nausea* 3     How frequently did these symptoms occur during the last week? Symptom Score   Worrying 3   Feeling sad 3   Feeling nervous 0     Health literacy score = 12 - Hearing deficit and doesn't wear her hearing aids. Review of Systems   Constitutional:  Positive for fatigue. HENT:  Positive for ear pain. Left ear pain secondary to cerumen impaction removal.   Respiratory:  Positive for cough. Negative for chest tightness, wheezing and stridor. Gastrointestinal:  Positive for abdominal pain. Intermittent left upper quad pain post-prandial.    Endocrine: Negative. Genitourinary: Negative. Neurological: Negative. Hematological: Negative. Psychiatric/Behavioral:  Negative for agitation, behavioral problems and sleep disturbance. Sadness bouts at times. Objective   Physical Exam  Vitals reviewed. Constitutional:       Appearance: Normal appearance. She is normal weight. HENT:      Head: Normocephalic. Right Ear: Ear canal normal. Tympanic membrane is scarred. Tympanic membrane is not erythematous.       Left Ear: Tympanic membrane is scarred and erythematous. Tympanic membrane is not injected. Nose: Nose normal.      Mouth/Throat:      Mouth: Mucous membranes are moist.   Eyes:      Pupils: Pupils are equal, round, and reactive to light. Cardiovascular:      Rate and Rhythm: Normal rate. Pulses: Normal pulses. Pulmonary:      Breath sounds: Normal breath sounds. Musculoskeletal:      Cervical back: Normal range of motion. Skin:     General: Skin is warm. Capillary Refill: Capillary refill takes 2 to 3 seconds. Neurological:      General: No focal deficit present. Mental Status: She is alert. Psychiatric:         Mood and Affect: Mood normal.         Behavior: Behavior normal.         Thought Content: Thought content normal.         Judgment: Judgment normal.        On this date 10/18/2022 I have spent 35 minutes reviewing previous notes, test results and face to face with the patient discussing the diagnosis and importance of compliance with the treatment plan as well as documenting on the day of the visit. An electronic signature was used to authenticate this note.     --MARIAA Long - CNP

## 2022-11-11 ENCOUNTER — CLINICAL DOCUMENTATION (OUTPATIENT)
Dept: PULMONOLOGY | Age: 82
End: 2022-11-11

## 2022-11-11 ENCOUNTER — TELEPHONE (OUTPATIENT)
Dept: PULMONOLOGY | Age: 82
End: 2022-11-11

## 2022-11-11 DIAGNOSIS — J44.9 CHRONIC OBSTRUCTIVE PULMONARY DISEASE, UNSPECIFIED COPD TYPE (HCC): ICD-10-CM

## 2022-11-11 RX ORDER — ROFLUMILAST 500 UG/1
TABLET ORAL
Qty: 30 TABLET | Refills: 6 | Status: SHIPPED | OUTPATIENT
Start: 2022-11-11

## 2022-11-11 NOTE — TELEPHONE ENCOUNTER
I have refilled trelegy and daliresp. I an not the prescribed of cymbalta and in review of record I cannot tell who original prescriber is. She is also seeing joycelyn, so looked at her notes and cannot confirm that she is prescriber. She needs to look at prescription bottle to determine prescriber and request refill from them. Please call her to let her know of the above. Note that she requested call when rx was sent in    Also, the pharmacy was not designated in this call/note, so I sent to [facility], [address]    Thanks so much.

## 2022-11-11 NOTE — TELEPHONE ENCOUNTER
1215 Report obtained from ED RN. Transported pt from cart 46 to 3221 via transport.     1315 Dr Franco at bedside. All questions asked and answered. Will continue current plan of care.     1720 Dr Franco made aware of most recent lab values. Will remain in ICU, on insulin gtt, labs drawn in am.       Pharmacy needs new prescription for this med     Roflumilast (DALIRESP) 500 MCG tablet   11/3/2021     Si po every day; may decrease to every other day if tolerated        fluticasone-umeclidin-vilant (TRELEGY ELLIPTA) 100-62.5-25 MCG/INH AEPB        DULoxetine (CYMBALTA) 60 MG extended release capsule    Patient ask that you call to let her know that these were sent in .

## 2022-11-11 NOTE — PROGRESS NOTES
Spoke to the patient and daughter, they informed me a clarification needed to be done to [place] @ [phone], spoke to Khushbu/Toya, script was  for Daliresp so verbal order was given, script for Trelegy was from 2022 so it will be filled as well. Call [facility] and cancelled scripts sent to them prior to calling [place] [id number]. Orders will be filled and sent by [place] [address].  [place], [place]

## 2022-11-22 ENCOUNTER — TELEPHONE (OUTPATIENT)
Dept: PULMONOLOGY | Age: 82
End: 2022-11-22

## 2022-11-22 NOTE — TELEPHONE ENCOUNTER
TRIAGE CALL      Complaint: Coughing  Cough: yes  Productive:  yes  Bloody Sputum:  no  Increased SOB/Wheezing:  yes  Duration: 5 days  Fever/Chills: no  OTC Meds tried: mucinex  Asking if she can come in today for appointment

## 2022-11-22 NOTE — TELEPHONE ENCOUNTER
Spoke with pt who states that she got her flu shot and is now experiencing flu like sx. Reviewed OTC sx control as well as medication regimen. Verbalized understanding. Scheduled for next week with Eloisa Lombardo NP per request in case she may need it.

## 2022-11-30 ENCOUNTER — OFFICE VISIT (OUTPATIENT)
Dept: PULMONOLOGY | Age: 82
End: 2022-11-30
Payer: MEDICARE

## 2022-11-30 VITALS
HEIGHT: 63 IN | BODY MASS INDEX: 23.57 KG/M2 | HEART RATE: 94 BPM | SYSTOLIC BLOOD PRESSURE: 118 MMHG | DIASTOLIC BLOOD PRESSURE: 62 MMHG | OXYGEN SATURATION: 99 % | TEMPERATURE: 98.7 F | WEIGHT: 133 LBS

## 2022-11-30 DIAGNOSIS — J20.9 ACUTE BRONCHITIS, UNSPECIFIED ORGANISM: ICD-10-CM

## 2022-11-30 DIAGNOSIS — G47.34 NOCTURNAL HYPOXIA: ICD-10-CM

## 2022-11-30 DIAGNOSIS — Z51.5 PALLIATIVE CARE PATIENT: ICD-10-CM

## 2022-11-30 DIAGNOSIS — J43.2 CENTRILOBULAR EMPHYSEMA (HCC): ICD-10-CM

## 2022-11-30 DIAGNOSIS — J44.1 COPD EXACERBATION (HCC): Primary | ICD-10-CM

## 2022-11-30 PROCEDURE — G8427 DOCREV CUR MEDS BY ELIG CLIN: HCPCS | Performed by: NURSE PRACTITIONER

## 2022-11-30 PROCEDURE — 3074F SYST BP LT 130 MM HG: CPT | Performed by: NURSE PRACTITIONER

## 2022-11-30 PROCEDURE — 99214 OFFICE O/P EST MOD 30 MIN: CPT | Performed by: NURSE PRACTITIONER

## 2022-11-30 PROCEDURE — 3023F SPIROM DOC REV: CPT | Performed by: NURSE PRACTITIONER

## 2022-11-30 PROCEDURE — G8484 FLU IMMUNIZE NO ADMIN: HCPCS | Performed by: NURSE PRACTITIONER

## 2022-11-30 PROCEDURE — 1090F PRES/ABSN URINE INCON ASSESS: CPT | Performed by: NURSE PRACTITIONER

## 2022-11-30 PROCEDURE — G8420 CALC BMI NORM PARAMETERS: HCPCS | Performed by: NURSE PRACTITIONER

## 2022-11-30 PROCEDURE — 3078F DIAST BP <80 MM HG: CPT | Performed by: NURSE PRACTITIONER

## 2022-11-30 PROCEDURE — G8399 PT W/DXA RESULTS DOCUMENT: HCPCS | Performed by: NURSE PRACTITIONER

## 2022-11-30 PROCEDURE — 1036F TOBACCO NON-USER: CPT | Performed by: NURSE PRACTITIONER

## 2022-11-30 PROCEDURE — 1123F ACP DISCUSS/DSCN MKR DOCD: CPT | Performed by: NURSE PRACTITIONER

## 2022-11-30 RX ORDER — DOXYCYCLINE HYCLATE 100 MG/1
100 CAPSULE ORAL 2 TIMES DAILY
Qty: 14 CAPSULE | Refills: 0 | Status: SHIPPED | OUTPATIENT
Start: 2022-11-30 | End: 2022-12-07

## 2022-11-30 RX ORDER — PREDNISONE 20 MG/1
TABLET ORAL
Qty: 10 TABLET | Refills: 0 | Status: SHIPPED | OUTPATIENT
Start: 2022-11-30

## 2022-11-30 ASSESSMENT — ENCOUNTER SYMPTOMS
COUGH: 1
WHEEZING: 1
SHORTNESS OF BREATH: 1
SPUTUM PRODUCTION: 1
HEMOPTYSIS: 0

## 2022-11-30 NOTE — PROGRESS NOTES
She is an 74-year-old female w/ hx of COPD, bronchiectasis, upper lobe predominant emphysema, prior tobacco use who is seen today as a work in visit due to [employer] like symptoms\" after having flu vaccine last week. Advised by triage in OTC rx to aid with symptoms and given work in appt for today in the event that she failed to improve. DIAGNOSTICS:       CPFT's 8/2015-flow volume loop reveals airway obstruction, no significant bronchodilator response, lung volumes were low normal.  Diffusion capacity was decreased, study is consistent with COPD. Spirometry 9/2017-moderate obstructive defect with decreased FVC. Chest CT 12/2017-stable right upper lobe groundglass nodule which has been stable dating back to February 2012. Improving atelectasis in bilateral lower lobes. Stable mild-moderate centrilobular emphysematous changes in the upper lobes. Spirometry 9/18/2018-moderate obstructive defect with decreased FVC. No significant interval change. Spirometry 3/21/2019-moderately severe restrictive defect with obstructive pattern on expiratory loop. Slight but statistically insignificant improvement in FVC and FEV1. Previous CPFT's demonstrated obstructive defect. CT of chest 2/4/20202437-[id number] for PE. Severe emphysema, increased interstitial markings bilaterally, bibasilar atelectasis or scarring. Probable acute superimposed interstitial pneumonitis and alveolitis. Chest CT 5/1/2020- upper lobe predominant centrilobular emphysema, no consolidation or infiltrate. Lymph nodes normal.  O2 sat 5/6/2020 - at patient's home, RA rest 98%, heart rate 68 - 78;  RA w/ exertion 94%, heart rate 110  Spirometry 11/9/2020-moderate restrictive defect, technically limited study, no significant interval change in FVC and FEV1.

## 2022-11-30 NOTE — PATIENT INSTRUCTIONS
Prednisone taper as prescribed, take after morning meal.  Doxycycline 1 twice daily for 7 days. Advised to use albuterol via nebulizer 4 times daily until she has improved, then she may attempt to decrease frequency. She can always increase back to 4 times daily as needed for worsening shortness of breath, wheezing, cough/chest congestion and mucus clearance. OTC mucolytic, (mucinex or generic equivalent of guaifenesin), 2400mg in 24 hours in divided doses as needed to thin mucous. Continue Daliresp as prescribed. Continue oxygen with sleep as prescribed. Order for new nebulizer will be sent to [facility].  Advised to contact us if she does not receive it within 1 week. Follow-up as previously planned. Palliative care appointment as planned.

## 2022-11-30 NOTE — PROGRESS NOTES
[address] Michelle Matias. [address], [address] [facility]  [phone]    Patient Name:  Mo Ayala    YOB: 1940    Office Visit 11/30/2022      CHIEF COMPLAINT:      Chief Complaint   Patient presents with    Other     Work-in         ASSESSMENT:   (Medical Decision Making)                                                                                                                                          Encounter Diagnoses   Name Primary? COPD exacerbation (HCC) Yes    Acute bronchitis, unspecified organism     Centrilobular emphysema ([address]     Nocturnal hypoxia      Patient reports onset of wheezing, cough/chest congestion approximately 2 weeks ago. She had cough productive of thick, purulent sputum. No fever. Triage note indicated her symptoms followed flu vaccine but patient and daughter indicate that her symptoms preceded vaccine. No fever, chills, hemoptysis. She is afebrile at time of visit today and oxygen saturation is 99% on room air. She has wet sounding cough. She has faint wheezing. We will treat for exacerbation. Had been doing nebulizer treatments 1-2 times daily, compliant with maintenance inhaler. She is compliant with oxygen with sleep. She requests new nebulizer as hers is very old. PLAN:     Prednisone taper as prescribed, take after morning meal.  Doxycycline 1 twice daily for 7 days. Advised to use albuterol via nebulizer 4 times daily until she has improved, then she may attempt to decrease frequency. She can always increase back to 4 times daily as needed for worsening shortness of breath, wheezing, cough/chest congestion and mucus clearance. OTC mucolytic, (mucinex or generic equivalent of guaifenesin), 2400mg in 24 hours in divided doses as needed to thin mucous. Continue Daliresp as prescribed. Continue oxygen with sleep as prescribed.     Order for new nebulizer [facility].  Advised to contact us if she does not receive it within 1 week. Follow-up as previously planned. Palliative care appointment as planned. Orders Placed This Encounter    predniSONE (DELTASONE) 20 MG tablet     Si po q am pc for 2 days, 1 and 1/2 for 2 days, 1 for 2 days, 1/2 for 2 days, then stop or as directed. Dispense:  10 tablet     Refill:  0    doxycycline hyclate (VIBRAMYCIN) 100 MG capsule     Sig: Take 1 capsule by mouth 2 times daily for 7 days     Dispense:  14 capsule     Refill:  0         MARIAA PASCUAL CNP    Total  time spent with patient - 22 min. Collaborating MD: Dr. Damian Maldonado:    She is an 71-year-old female w/ hx of COPD, bronchiectasis, upper lobe predominant emphysema, prior tobacco use who is seen today as a work in visit due to [employer] like symptoms\" after having flu vaccine last week. Advised by triage in OTC rx to aid with symptoms and given work in appt for today in the event that she failed to improve. She is accompanied by her daughter today. She assists with history. Patient has significant hearing loss. She reports onset of wheezing, cough/chest congestion approximately 2 weeks ago. She had cough productive of thick, purulent sputum. No fever. Triage note indicated her symptoms followed flu vaccine but patient and daughter indicate that her symptoms preceded vaccine. No fever, chills, hemoptysis. She is afebrile at time of visit today and oxygen saturation is 99% on room air. She has wet sounding cough. She has faint wheezing. Had been doing nebulizer treatments 1-2 times daily, compliant with maintenance inhaler. She is compliant with oxygen with sleep. She requests new nebulizer as hers is very old.       DIAGNOSTICS:       CPFT's 2015-flow volume loop reveals airway obstruction, no significant bronchodilator response, lung volumes were low normal.  Diffusion capacity was decreased, study is consistent with COPD. Spirometry 9/2017-moderate obstructive defect with decreased FVC. Chest CT 12/2017-stable right upper lobe groundglass nodule which has been stable dating back to February 2012. Improving atelectasis in bilateral lower lobes. Stable mild-moderate centrilobular emphysematous changes in the upper lobes. Spirometry 9/18/2018-moderate obstructive defect with decreased FVC. No significant interval change. Spirometry 3/21/2019-moderately severe restrictive defect with obstructive pattern on expiratory loop. Slight but statistically insignificant improvement in FVC and FEV1. Previous CPFT's demonstrated obstructive defect. CT of chest 2/4/[id number] for PE. Severe emphysema, increased interstitial markings bilaterally, bibasilar atelectasis or scarring. Probable acute superimposed interstitial pneumonitis and alveolitis. Chest CT 5/1/2020- upper lobe predominant centrilobular emphysema, no consolidation or infiltrate. Lymph nodes normal.  O2 sat 5/6/2020 - at patient's home, RA rest 98%, heart rate 68 - 78;  RA w/ exertion 94%, heart rate 110  Spirometry 11/9/2020-moderate restrictive defect, technically limited study, no significant interval change in FVC and FEV    _____________________________________________________________      REVIEW OF SYSTEMS:    Review of Systems   Constitutional: Positive for malaise/fatigue. Negative for chills and fever. HENT:  Positive for ear pain and hearing loss. Cardiovascular:  Negative for chest pain. Respiratory:  Positive for cough, shortness of breath, sputum production and wheezing. Negative for hemoptysis. PHYSICAL EXAM:    Vitals:    11/30/22 1450   BP: 118/62   Pulse: 94   Temp: 98.7 °F (37.1 °C)   TempSrc: Skin   SpO2: 99%   Weight: 133 lb (60.3 kg)   Height: 5' 3\" (1.6 m)        Body mass index is 23.56 kg/m².     GENERAL APPEARANCE:  The patient is normal weight and in no respiratory distress. HEENT:  PERRL. Conjunctivae unremarkable.  + Hearing deficit. NECK/LYMPHATIC:   Symmetrical with no elevation of jugular venous pulsation. Trachea midline. LUNGS:   Normal respiratory effort with symmetrical lung expansion. Breath sounds-decreased, wet sounding cough. Faint wheeze. Minimal rhonchi. No crackles. HEART:   There is a normal rate and regular rhythm. No murmur, rub, or gallop. There is no edema in the lower extremities. NEURO:  The patient is alert and oriented to person, place, and time. Memory appears intact and mood is normal.  No gross sensorimotor deficits are present. DIAGNOSTIC TESTS: Studies were personally reviewed by me and discussed with the patient.     Spirometry:    Office Spirometry Results Latest Ref Rng & Units 9/2/2022   FVC L 1.70   FEV1 L 1.24   FEV1 %PRED-PRE % 71   FVC %PRED-PRE % 72   FEV1/FVC % 73     Past Medical History:   Diagnosis Date    Acute bacterial sinusitis 5/24/2017    Adenoma of left adrenal gland     Allergic rhinitis     Arthritis     Asthma     Bronchiectasis ([facility])     Cholelithiasis     Chronic back pain     Chronic obstructive pulmonary disease (COPD) ([facility])     Chronic pain syndrome     Chronic renal insufficiency     Chronic restrictive lung disease     Congestive heart failure (CHF) ([facility])     COPD exacerbation ([address] 1/9/2015    Coronary artery disease     Depression with anxiety     Diarrhea     Dizziness     Ear problems     Gastroesophageal reflux disease     Glaucoma     Hyperkalemia     Hyperlipidemia      Hypertension     Macular degeneration     Neuropathy     Obstructive sleep apnea     Peripheral vascular disease ([facility])     Primary hypothyroidism     Proteinuria     Pruritic disorder     Spinal stenosis     Stenosis of right carotid artery     Trigger thumb     Type 2 diabetes mellitus ([facility])     Venous stasis        Patient Active Problem List   Diagnosis    CAD (coronary artery disease)    Hypertension    Macular degeneration    Chronic respiratory failure with hypoxia (HCC)    Nocturnal hypoxia    Pulmonary hypertension (HCC)    Proteinuria    Cholelithiasis    Depression    Chronic obstructive pulmonary disease (HCC)    Hyperaldosteronism (HCC)    Controlled type 2 diabetes mellitus without complication, without long-term current use of insulin (HCC)    Abnormal chest CT    Centrilobular emphysema (HCC)    Arthritis    Carotid artery stenosis without cerebral infarction    Bronchitis    Primary hypothyroidism    Carotid stenosis, right    Neuropathy    Menopausal disorder    PVD (peripheral vascular disease) (HCC)    Recurrent depression (HCC)    Leg pain    Chronic renal insufficiency    Congestive heart failure (CHF) ([address]    Hyperlipidemia    Left carotid stenosis    Bronchiectasis (HCC)    Spondylosis with myelopathy, lumbar region    Asthma    Chronic back pain    Obesity    Parotitis    BERNARD on CPAP    Chronic pain syndrome    Adenoma of left adrenal gland    S/p bilateral carotid endarterectomy    Pruritic disorder    Dyspnea    Hypoxemia    GERD (gastroesophageal reflux disease)    Type 2 diabetes mellitus with diabetic neuropathy (HCC)    Grade I hemorrhoids    Pulmonary nodule    Chronic restrictive lung disease    Type 2 diabetes mellitus with proliferative retinopathy (HCC)    Cigarette nicotine dependence in remission    Diverticulitis    Carotid artery disease ([address]    Pulmonary infiltrate    Anxiety    Type 2 diabetes mellitus with nephropathy ([address]       Past Surgical History:   Procedure Laterality Date    APPENDECTOMY      BACK SURGERY  11/09/2015    SCS trial    CAROTID ENDARTERECTOMY Right 04/2014    CAROTID ENDARTERECTOMY Left 01/2014    CATARACT REMOVAL Bilateral 2014    CYST REMOVAL  02/14/2000    back    LUMBAR FUSION      MYRINGOTOMY Right     x2    FRANCINE AND BSO (CERVIX REMOVED)  1978         Social History     Socioeconomic History    Marital status:       Spouse name: None    Number of children: None    Years of education: None    Highest education level: None   Tobacco Use    Smoking status: Former     Packs/day: 1.00     Years: 30.00     Pack years: 30.00     Types: Cigarettes     Quit date: 2011     Years since quittin.0    Smokeless tobacco: Never    Tobacco comments:     Quit smoking: quit    Substance and Sexual Activity    Alcohol use: No     Alcohol/week: 0.0 standard drinks    Drug use: No   Social History Narrative    Lives alone, granddaughter lives next door. 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 Family History   Problem Relation Age of Onset    Breast Cancer Mother     Cancer Sister         uterine    Heart Disease Father     Cancer Mother     Diabetes Brother     Heart Attack Father        Allergies   Allergen Reactions    Penicillins Rash    Sulfa Antibiotics Rash    Aripiprazole Other (See Comments)     Causes involuntary mouth movement     Atorvastatin Other (See Comments)     Legs hurt    Bupropion Other (See Comments)     Made her \"jerk\"    Lisinopril Other (See Comments)     Cough? ?    Simvastatin Other (See Comments)     Causes leg pain. Lf leg.      Trazodone Other (See Comments)     nightmares    Varenicline Nausea Only       Current Outpatient Medications   Medication Sig    fluticasone-umeclidin-vilant (TRELEGY ELLIPTA) 100-62.5-25 MCG/INH AEPB 1 inhalation daily, rinse mouth after use    Roflumilast (DALIRESP) 500 MCG tablet 1 po every day; may decrease to every other day if needed    albuterol sulfate HFA (PROVENTIL;VENTOLIN;PROAIR) 108 (90 Base) MCG/ACT inhaler Inhale 2 puffs into the lungs every 4 hours as needed for Wheezing or Shortness of Breath    omeprazole (PRILOSEC) 20 MG delayed release capsule Take 1 capsule by mouth 2 times daily (before meals)    montelukast (SINGULAIR) 10 MG tablet 1 tablet for allergies, lungs    guaiFENesin 400 MG tablet 1 tablet as needed    levocetirizine (XYZAL) 5 MG tablet nightly acetaminophen (TYLENOL) 500 MG tablet Take 500 mg by mouth every 6 hours as needed    albuterol (PROVENTIL) (2.5 MG/3ML) 0.083% nebulizer solution Inhale 2.5 mg into the lungs 4 times daily as needed    ALPRAZolam (XANAX) 0.5 MG tablet Take 0.5 mg by mouth nightly as needed. amLODIPine (NORVASC) 5 MG tablet Take 2.5 mg by mouth daily    aspirin 81 MG EC tablet Take 81 mg by mouth    DULoxetine (CYMBALTA) 60 MG extended release capsule Take 60 mg by mouth 2 times daily    levothyroxine (SYNTHROID) 100 MCG tablet Take 88 mcg by mouth every morning (before breakfast) Per patient taking 88 mcg tablet    acetaminophen-codeine (TYLENOL/CODEINE #3) 300-30 MG per tablet Take 1 tablet by mouth in the morning and at bedtime for 30 days. Z51.5 Palliative Care     No current facility-administered medications for this visit. Over 50% of today's office visit was spent in face to face time reviewing test results, prognosis, importance of compliance, education about disease process, benefits of medications, instructions for management of acute symptoms, and follow up plans. Electronically signed. Dictated using voice recognition software.   Proof read but unrecognized errors may exist.

## 2022-12-01 ENCOUNTER — TELEPHONE (OUTPATIENT)
Dept: PULMONOLOGY | Age: 82
End: 2022-12-01

## 2022-12-01 DIAGNOSIS — J44.1 COPD EXACERBATION (HCC): Primary | ICD-10-CM

## 2022-12-01 DIAGNOSIS — Z51.5 PALLIATIVE CARE PATIENT: ICD-10-CM

## 2022-12-01 DIAGNOSIS — J20.9 ACUTE BRONCHITIS, UNSPECIFIED ORGANISM: ICD-10-CM

## 2022-12-01 DIAGNOSIS — G89.4 CHRONIC PAIN SYNDROME: ICD-10-CM

## 2022-12-01 NOTE — TELEPHONE ENCOUNTER
Patient is needing her     acetaminophen-codeine (TYLENOL/CODEINE #3) 300-30 MG per tablet    [facility]. In [facility] this will be a pharmacy change this is closer to the patient

## 2022-12-06 RX ORDER — ACETAMINOPHEN AND CODEINE PHOSPHATE 300; 30 MG/1; MG/1
1 TABLET ORAL 2 TIMES DAILY
Qty: 60 TABLET | Refills: 3 | Status: SHIPPED | OUTPATIENT
Start: 2022-12-06 | End: 2023-04-05

## 2022-12-27 ENCOUNTER — TELEPHONE (OUTPATIENT)
Dept: PULMONOLOGY | Age: 82
End: 2022-12-27

## 2022-12-27 NOTE — TELEPHONE ENCOUNTER
Patient says that she seems to have a lot neuropathy in her legs.  . She thinks she needs to be tested  for psoriasis

## 2022-12-29 ENCOUNTER — TELEPHONE (OUTPATIENT)
Dept: PULMONOLOGY | Age: 82
End: 2022-12-29

## 2023-03-09 NOTE — PROGRESS NOTES
She is an 26-year-old female w/ hx of COPD, bronchiectasis, upper lobe predominant emphysema, prior tobacco use who is seen today in routine follow up. DIAGNOSTICS:       CPFT's 8/2015-flow volume loop reveals airway obstruction, no significant bronchodilator response, lung volumes were low normal.  Diffusion capacity was decreased, study is consistent with COPD. Spirometry 9/2017-moderate obstructive defect with decreased FVC. Chest CT 12/2017-stable right upper lobe groundglass nodule which has been stable dating back to February 2012. Improving atelectasis in bilateral lower lobes. Stable mild-moderate centrilobular emphysematous changes in the upper lobes. Spirometry 9/18/2018-moderate obstructive defect with decreased FVC. No significant interval change. Spirometry 3/21/2019-moderately severe restrictive defect with obstructive pattern on expiratory loop. Slight but statistically insignificant improvement in FVC and FEV1. Previous CPFT's demonstrated obstructive defect. CT of chest 2/4/[id number] for PE. Severe emphysema, increased interstitial markings bilaterally, bibasilar atelectasis or scarring. Probable acute superimposed interstitial pneumonitis and alveolitis. Chest CT 5/1/2020- upper lobe predominant centrilobular emphysema, no consolidation or infiltrate.   Lymph nodes normal.  O2 sat 5/6/2020 - at patient's home, RA rest 98%, heart rate 68 - 78;  RA w/ exertion 94%, heart rate 110  Spirometry 11/9/2020-moderate restrictive defect, technically limited study, no significant interval change in FVC and FEV

## 2023-03-10 ENCOUNTER — OFFICE VISIT (OUTPATIENT)
Dept: PULMONOLOGY | Age: 83
End: 2023-03-10
Payer: MEDICARE

## 2023-03-10 VITALS
BODY MASS INDEX: 22.32 KG/M2 | OXYGEN SATURATION: 98 % | WEIGHT: 126 LBS | SYSTOLIC BLOOD PRESSURE: 118 MMHG | HEART RATE: 77 BPM | DIASTOLIC BLOOD PRESSURE: 74 MMHG | HEIGHT: 63 IN | TEMPERATURE: 97.3 F

## 2023-03-10 DIAGNOSIS — J96.11 CHRONIC RESPIRATORY FAILURE WITH HYPOXIA (HCC): ICD-10-CM

## 2023-03-10 DIAGNOSIS — J44.9 MODERATE COPD (CHRONIC OBSTRUCTIVE PULMONARY DISEASE) (HCC): Primary | ICD-10-CM

## 2023-03-10 DIAGNOSIS — F41.9 ANXIETY: ICD-10-CM

## 2023-03-10 DIAGNOSIS — J47.9 BRONCHIECTASIS WITHOUT COMPLICATION (HCC): ICD-10-CM

## 2023-03-10 DIAGNOSIS — Z51.5 PALLIATIVE CARE PATIENT: ICD-10-CM

## 2023-03-10 DIAGNOSIS — F17.211 CIGARETTE NICOTINE DEPENDENCE IN REMISSION: ICD-10-CM

## 2023-03-10 DIAGNOSIS — J43.2 CENTRILOBULAR EMPHYSEMA (HCC): ICD-10-CM

## 2023-03-10 DIAGNOSIS — G89.4 CHRONIC PAIN SYNDROME: ICD-10-CM

## 2023-03-10 PROCEDURE — 1123F ACP DISCUSS/DSCN MKR DOCD: CPT | Performed by: NURSE PRACTITIONER

## 2023-03-10 PROCEDURE — 1090F PRES/ABSN URINE INCON ASSESS: CPT | Performed by: NURSE PRACTITIONER

## 2023-03-10 PROCEDURE — G8427 DOCREV CUR MEDS BY ELIG CLIN: HCPCS | Performed by: NURSE PRACTITIONER

## 2023-03-10 PROCEDURE — 99214 OFFICE O/P EST MOD 30 MIN: CPT | Performed by: NURSE PRACTITIONER

## 2023-03-10 PROCEDURE — G8420 CALC BMI NORM PARAMETERS: HCPCS | Performed by: NURSE PRACTITIONER

## 2023-03-10 PROCEDURE — G8484 FLU IMMUNIZE NO ADMIN: HCPCS | Performed by: NURSE PRACTITIONER

## 2023-03-10 PROCEDURE — 3023F SPIROM DOC REV: CPT | Performed by: NURSE PRACTITIONER

## 2023-03-10 PROCEDURE — 3074F SYST BP LT 130 MM HG: CPT | Performed by: NURSE PRACTITIONER

## 2023-03-10 PROCEDURE — G8399 PT W/DXA RESULTS DOCUMENT: HCPCS | Performed by: NURSE PRACTITIONER

## 2023-03-10 PROCEDURE — 3078F DIAST BP <80 MM HG: CPT | Performed by: NURSE PRACTITIONER

## 2023-03-10 PROCEDURE — 1036F TOBACCO NON-USER: CPT | Performed by: NURSE PRACTITIONER

## 2023-03-10 ASSESSMENT — ENCOUNTER SYMPTOMS
WHEEZING: 0
SHORTNESS OF BREATH: 0
HEMOPTYSIS: 0
SPUTUM PRODUCTION: 0
COUGH: 0

## 2023-03-10 NOTE — PROGRESS NOTES
[address] [place]rmond. [address] [facility], [address] [facility]  [phone]    Patient Name:  Laurel Lincoln    YOB: 1940    Office Visit 3/10/2023      CHIEF COMPLAINT:      Chief Complaint   Patient presents with    COPD    Follow-up    Other     hypoxia         ASSESSMENT:   (Medical Decision Making)                                                                                                                                          Encounter Diagnoses   Name Primary? Moderate COPD (chronic obstructive pulmonary disease) (HCC) Yes    Centrilobular emphysema (HCC)     Chronic respiratory failure with hypoxia (HCC)     Bronchiectasis without complication (HCC)     Cigarette nicotine dependence in remission     Chronic pain syndrome     Palliative care patient     Anxiety      Today, she reports interval improvement in shortness of breath, no significant cough at this time. She is compliant with maintenance inhaler. Using albuterol if needed. She is compliant with oxygen with sleep as prescribed. Much of today's visit is centered around her reports of pain and ultimately, generalized discomfort. However, she reports burning in her feet/neuropathic pain, for which Cymbalta has been prescribed for her in the past.  She questions whether she should see a specialist for this. I have suggested that she discuss this with her primary provider. Per review of palliative care visit, there has been some improvement in discomfort. She had to reschedule palliative care appointment, has upcoming appointment. Reviewed goals of palliative care with her and her daughter again today. Primary goal to reduce symptom burden. Discussed that different types of pain require different types of therapy/Rx and that adjustment may be made in Rx at upcoming appointment.     As previously noted, she has a good deal of anxiety which seems to escalate during the course of the visit.                    PLAN:     Continue Trelegy 1 inhalation every morning, rinse mouth after use. Albuterol 4 times daily if needed for shortness of breath, wheezing, cough/chest congestion or mucus clearance. OTC mucolytic, (mucinex or generic equivalent of guaifenesin), 2400mg in 24 hours in divided doses as needed to thin mucous. Continue oxygen with sleep as prescribed. Encouraged her to ambulate with rolling walker in her house several times daily. Follow-up in 4 months, sooner if needed. Follow-up in palliative care clinic as scheduled. MARIAA Mulligan CNP    Total  time spent with patient - 43 min. Collaborating MD: Dr. Mario Ardon:    She is an 79-year-old female w/ hx of COPD, bronchiectasis, upper lobe predominant emphysema, prior tobacco use who is seen today in routine follow up. She is accompanied by her daughter who assists with history. She reports interval improvement in shortness of breath. She expresses concern about this. There has been decrease in chest congestion, denies purulent sputum, fever, chills, night sweats or hemoptysis. Reports right sided lower rib discomfort if she bends from the waist.  Not a constant discomfort. Has jaw discomfort, reports that ENT reassured her that is not related to her ear, but arthritic changes. Has burning in her feet. Sitting a good bit of the day and has discomfort when getting up. Has rolling walker, but not with her today, states does not want to burden family when transporting her. She becomes tearful during the visit, displays a good bit of anxiety. She is compliant with Trelegy inhaler. She is using albuterol if needed. She is compliant with Daliresp. Believes that this has been helpful in decreasing exacerbations.   She is compliant with Singulair, Xyzal.     DIAGNOSTICS:       CPFT's 8/2015-flow volume loop reveals airway obstruction, no significant bronchodilator response, lung volumes were low normal.  Diffusion capacity was decreased, study is consistent with COPD. Spirometry 9/2017-moderate obstructive defect with decreased FVC. Chest CT 12/2017-stable right upper lobe groundglass nodule which has been stable dating back to February 2012. Improving atelectasis in bilateral lower lobes. Stable mild-moderate centrilobular emphysematous changes in the upper lobes. Spirometry 9/18/2018-moderate obstructive defect with decreased FVC. No significant interval change. Spirometry 3/21/2019-moderately severe restrictive defect with obstructive pattern on expiratory loop. Slight but statistically insignificant improvement in FVC and FEV1. Previous CPFT's demonstrated obstructive defect. CT of chest 2/4/20208083-[id number] for PE. Severe emphysema, increased interstitial markings bilaterally, bibasilar atelectasis or scarring. Probable acute superimposed interstitial pneumonitis and alveolitis. Chest CT 5/1/2020- upper lobe predominant centrilobular emphysema, no consolidation or infiltrate. Lymph nodes normal.  O2 sat 5/6/2020 - at patient's home, RA rest 98%, heart rate 68 - 78;  RA w/ exertion 94%, heart rate 110  Spirometry 11/9/2020-moderate restrictive defect, technically limited study, no significant interval change in FVC and FEV    _____________________________________________________________      REVIEW OF SYSTEMS:    Review of Systems   Constitutional: Positive for weight loss. Negative for chills, fever and night sweats. Respiratory:  Negative for cough, hemoptysis, shortness of breath, sputum production and wheezing. Musculoskeletal:         Generalized pain, unchanged. Right-sided lower rib discomfort when bending over. Jaw pain. Neurological:  Positive for sensory change. Burning sensation in feet, has been on Cymbalta for some time for this.           PHYSICAL EXAM:    Vitals:    03/10/23 1509   BP: 118/74   Pulse: 77   Temp: 97.3 °F (36.3 °C)   TempSrc: Skin   SpO2: 98%   Weight: 126 lb (57.2 kg)   Height: 5' 3\" (1.6 m)        Body mass index is 22.32 kg/m². GENERAL APPEARANCE:  The patient is frail and in no respiratory distress. HEENT:  PERRL. Conjunctivae unremarkable. NECK/LYMPHATIC:   Symmetrical with no elevation of jugular venous pulsation. Trachea midline. LUNGS:   Normal respiratory effort with symmetrical lung expansion. Breath sounds-decreased but overall are clear. HEART:   There is a normal rate and regular rhythm. No murmur, rub, or gallop. There is no edema in the lower extremities. NEURO:  The patient is alert and oriented to person, place, and time. Memory appears intact and mood is normal.  No gross sensorimotor deficits are present. Ambulating with cane. DIAGNOSTIC TESTS: Studies were personally reviewed by me and discussed with the patient.     Spirometry:    Office Spirometry Results Latest Ref Rng & Units 9/2/2022   FVC L 1.70   FEV1 L 1.24   FEV1 %PRED-PRE % 71   FVC %PRED-PRE % 72   FEV1/FVC % 73       Past Medical History:   Diagnosis Date    Acute bacterial sinusitis 5/24/2017    Adenoma of left adrenal gland     Allergic rhinitis     Arthritis     Asthma     Bronchiectasis ([facility])     Cholelithiasis     Chronic back pain     Chronic obstructive pulmonary disease (COPD) ([facility])     Chronic pain syndrome     Chronic renal insufficiency     Chronic restrictive lung disease     Congestive heart failure (CHF) ([facility])     COP[facility] 1/9/2015    Coronary artery disease     Depression with anxiety     Diarrhea     Dizziness     Ear problems     Gastroesophageal reflux disease     Glaucoma     Hyperkalemia     Hyperlipidemia      Hypertension     Macular degeneration     Neuropathy     Obstructive sleep apnea     Peripheral vascular disease ([facility])     Primary hypothyroidism     Proteinuria     Pruritic disorder     Spinal stenosis     Stenosis of right carotid artery     Trigger thumb Type 2 diabetes mellitus (HCC)     Venous stasis        Patient Active Problem List   Diagnosis    CAD (coronary artery disease)    Hypertension    Macular degeneration    Chronic respiratory failure with hypoxia (HCC)    Nocturnal hypoxia    Pulmonary hypertension (HCC)    Proteinuria    Cholelithiasis    Depression    Chronic obstructive pulmonary disease (HCC)    Hyperaldosteronism (HCC)    Controlled type 2 diabetes mellitus without complication, without long-term current use of insulin (HCC)    Abnormal chest CT    Centrilobular emphysema (HCC)    Arthritis    Carotid artery stenosis without cerebral infarction    Bronchitis    Primary hypothyroidism    Carotid stenosis, right    Neuropathy    Menopausal disorder    PVD (peripheral vascular disease) (HCC)    Recurrent depression (HCC)    Leg pain    Chronic renal insufficiency    Congestive heart failure (CHF) ([address]    Hyperlipidemia    Left carotid stenosis    Bronchiectasis (HCC)    Spondylosis with myelopathy, lumbar region    Asthma    Chronic back pain    Obesity    Parotitis    BERNARD on CPAP    Chronic pain syndrome    Adenoma of left adrenal gland    S/p bilateral carotid endarterectomy    Pruritic disorder    Dyspnea    Hypoxemia    GERD (gastroesophageal reflux disease)    Type 2 diabetes mellitus with diabetic neuropathy (HCC)    Grade I hemorrhoids    Pulmonary nodule    Chronic restrictive lung disease    Type 2 diabetes mellitus with proliferative retinopathy (HCC)    Cigarette nicotine dependence in remission    Diverticulitis    Carotid artery disease ([address]    Pulmonary infiltrate    Anxiety    Type 2 diabetes mellitus with nephropathy ([address]       Past Surgical History:   Procedure Laterality Date    APPENDECTOMY      BACK SURGERY  11/09/2015    SCS trial    CAROTID ENDARTERECTOMY Right 04/2014    CAROTID ENDARTERECTOMY Left 01/2014    CATARACT REMOVAL Bilateral 2014    CYST REMOVAL  02/14/2000    back    LUMBAR FUSION      MYRINGOTOMY Right x2    FRANCINE AND BSO (CERVIX REMOVED)           Social History     Socioeconomic History    Marital status:      Spouse name: None    Number of children: None    Years of education: None    Highest education level: None   Tobacco Use    Smoking status: Former     Packs/day: 1.00     Years: 30.00     Pack years: 30.00     Types: Cigarettes     Quit date: 2011     Years since quittin.2    Smokeless tobacco: Never    Tobacco comments:     Quit smoking: quit    Substance and Sexual Activity    Alcohol use: No     Alcohol/week: 0.0 standard drinks    Drug use: No   Social History Narrative    Lives alone, granddaughter lives next door. 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 Family History   Problem Relation Age of Onset    Breast Cancer Mother     Cancer Sister         uterine    Heart Disease Father     Cancer Mother     Diabetes Brother     Heart Attack Father        Allergies   Allergen Reactions    Penicillins Rash    Sulfa Antibiotics Rash    Aripiprazole Other (See Comments)     Causes involuntary mouth movement     Atorvastatin Other (See Comments)     Legs hurt    Bupropion Other (See Comments)     Made her \"jerk\"    Lisinopril Other (See Comments)     Cough? ?    Simvastatin Other (See Comments)     Causes leg pain. Lf leg. Trazodone Other (See Comments)     nightmares    Varenicline Nausea Only       Current Outpatient Medications   Medication Sig    acetaminophen-codeine (TYLENOL/CODEINE #3) 300-30 MG per tablet Take 1 tablet by mouth in the morning and at bedtime for 120 days.  Z51.5 Palliative Care    fluticasone-umeclidin-vilant (TRELEGY ELLIPTA) 100-62.5-25 MCG/INH AEPB 1 inhalation daily, rinse mouth after use    Roflumilast (DALIRESP) 500 MCG tablet 1 po every day; may decrease to every other day if needed    omeprazole (PRILOSEC) 20 MG delayed release capsule Take 1 capsule by mouth 2 times daily (before meals)    montelukast (SINGULAIR) 10 MG tablet 1 tablet for allergies, lungs    guaiFENesin 400 MG tablet 1 tablet as needed    levocetirizine (XYZAL) 5 MG tablet nightly    acetaminophen (TYLENOL) 500 MG tablet Take 500 mg by mouth every 6 hours as needed    albuterol (PROVENTIL) (2.5 MG/3ML) 0.083% nebulizer solution Inhale 2.5 mg into the lungs 4 times daily as needed    ALPRAZolam (XANAX) 0.5 MG tablet Take 0.5 mg by mouth nightly as needed. amLODIPine (NORVASC) 5 MG tablet Take 2.5 mg by mouth daily    aspirin 81 MG EC tablet Take 81 mg by mouth    DULoxetine (CYMBALTA) 60 MG extended release capsule Take 60 mg by mouth 2 times daily    levothyroxine (SYNTHROID) 100 MCG tablet Take 88 mcg by mouth every morning (before breakfast) Per patient taking 88 mcg tablet    predniSONE (DELTASONE) 20 MG tablet 2 po q am pc for 2 days, 1 and 1/2 for 2 days, 1 for 2 days, 1/2 for 2 days, then stop or as directed. (Patient not taking: Reported on 3/10/2023)    albuterol sulfate HFA (PROVENTIL;VENTOLIN;PROAIR) 108 (90 Base) MCG/ACT inhaler Inhale 2 puffs into the lungs every 4 hours as needed for Wheezing or Shortness of Breath     No current facility-administered medications for this visit. Over 50% of today's office visit was spent in face to face time reviewing test results, prognosis, importance of compliance, education about disease process, benefits of medications, instructions for management of acute symptoms, and follow up plans. Electronically signed. Dictated using voice recognition software.   Proof read but unrecognized errors may exist.

## 2023-03-10 NOTE — PATIENT INSTRUCTIONS
Continue Trelegy 1 inhalation every morning, rinse mouth after use. Albuterol 4 times daily if needed for shortness of breath, wheezing, cough/chest congestion or mucus clearance. OTC mucolytic, (mucinex or generic equivalent of guaifenesin), 2400mg in 24 hours in divided doses as needed to thin mucous. Continue oxygen with sleep as prescribed. Encouraged her to ambulate with rolling walker in her house several times daily. Follow-up in 4 months, sooner if needed. Follow-up in palliative care clinic as scheduled.

## 2023-05-25 ENCOUNTER — TELEPHONE (OUTPATIENT)
Dept: PULMONOLOGY | Age: 83
End: 2023-05-25

## 2023-05-25 NOTE — TELEPHONE ENCOUNTER
TRIAGE CALL      Complaint: Coughing/congestion  Cough: yes  Productive:  yes  Bloody Sputum:  no  Increased SOB/Wheezing:  yes  Duration: 4 weeks  Fever/Chills: no  OTC Meds tried: mucinex

## 2023-05-26 RX ORDER — DOXYCYCLINE HYCLATE 100 MG
100 TABLET ORAL 2 TIMES DAILY
Qty: 14 TABLET | Refills: 0 | Status: SHIPPED | OUTPATIENT
Start: 2023-05-26 | End: 2023-06-02

## 2023-05-26 NOTE — TELEPHONE ENCOUNTER
I recommend that she use nebulizer qid routinely until back to baseline. Continue trelegy, mucolytic. Allergic to PCN and sulfa. Would use doxycycline 100mg bid for 7 days.

## 2023-05-26 NOTE — TELEPHONE ENCOUNTER
Last seen: 3/10/23  Hx: COPD, bronchiectasis, chronic pain, anxiety, palliative    Daughter Tad Soto listed on KIRAN; has pallaitive appt 6/5. Call from daughter reporting increased coughing & congestion, productive despite mucinex; contacted family, reports that was seen by PCP last week & was treated w/ antibiotic (zpack) & steroid, able to use nebulizer; confirmed mucinex dosing, wearing O2 @ HS; using albuterol nebulizer periodically. Notes zpack usually doesn't help much. No fever symptoms. Please advise for next steps.

## 2023-06-02 ENCOUNTER — TELEPHONE (OUTPATIENT)
Age: 83
End: 2023-06-02

## 2023-06-02 NOTE — TELEPHONE ENCOUNTER
Called and spoke with the patient to change her office visit to virtual visit on June 05, 2023. Patient is agreeable and voice undertanding.  Jarrett VERDUZCO

## 2023-06-05 ENCOUNTER — TELEMEDICINE (OUTPATIENT)
Age: 83
End: 2023-06-05
Payer: MEDICARE

## 2023-06-05 DIAGNOSIS — J44.9 CHRONIC OBSTRUCTIVE PULMONARY DISEASE, UNSPECIFIED COPD TYPE (HCC): ICD-10-CM

## 2023-06-05 DIAGNOSIS — G89.4 CHRONIC PAIN SYNDROME: Primary | ICD-10-CM

## 2023-06-05 DIAGNOSIS — Z51.5 PALLIATIVE CARE PATIENT: ICD-10-CM

## 2023-06-05 DIAGNOSIS — R53.81 DEBILITY: ICD-10-CM

## 2023-06-05 PROCEDURE — 1123F ACP DISCUSS/DSCN MKR DOCD: CPT | Performed by: NURSE PRACTITIONER

## 2023-06-05 PROCEDURE — 1036F TOBACCO NON-USER: CPT | Performed by: NURSE PRACTITIONER

## 2023-06-05 PROCEDURE — 1090F PRES/ABSN URINE INCON ASSESS: CPT | Performed by: NURSE PRACTITIONER

## 2023-06-05 PROCEDURE — 3023F SPIROM DOC REV: CPT | Performed by: NURSE PRACTITIONER

## 2023-06-05 PROCEDURE — G8420 CALC BMI NORM PARAMETERS: HCPCS | Performed by: NURSE PRACTITIONER

## 2023-06-05 PROCEDURE — G8399 PT W/DXA RESULTS DOCUMENT: HCPCS | Performed by: NURSE PRACTITIONER

## 2023-06-05 PROCEDURE — G8427 DOCREV CUR MEDS BY ELIG CLIN: HCPCS | Performed by: NURSE PRACTITIONER

## 2023-06-05 PROCEDURE — 99214 OFFICE O/P EST MOD 30 MIN: CPT | Performed by: NURSE PRACTITIONER

## 2023-06-05 RX ORDER — TRAMADOL HYDROCHLORIDE 50 MG/1
100 TABLET ORAL 2 TIMES DAILY
COMMUNITY

## 2023-06-05 NOTE — PROGRESS NOTES
Eyes:   EOM    [x]  Normal    [] Abnormal -   Sclera  [x]  Normal    [] Abnormal -          Discharge [x]  None visible   [] Abnormal -     HENT: [x] Normocephalic, atraumatic  [] Abnormal - Extremely [place]  [x] Mouth/Throat: Mucous membranes are moist    External Ears [x] Normal  [] Abnormal -    Neck: [x] No visualized mass [] Abnormal -     Pulmonary/Chest: [x] Respiratory effort normal   [x] No visualized signs of difficulty breathing or respiratory distress        [] Abnormal -      Musculoskeletal:   [x] Normal gait with no signs of ataxia         [x] Normal range of motion of neck        [] Abnormal -     Neurological:        [x] No Facial Asymmetry (Cranial nerve 7 motor function) (limited exam due to video visit)          [x] No gaze palsy        [] Abnormal -          Skin:        [x] No significant exanthematous lesions or discoloration noted on facial skin         [] Abnormal -            Psychiatric:       [x] Normal Affect [] Abnormal -        [x] No Hallucinations        On this date 6/5/2023 I have spent 40 minutes reviewing previous notes, test results and face to face (virtual) with the patient discussing the diagnosis and importance of compliance with the treatment plan as well as documenting on the day of the visit. Ronald Number, was evaluated through a synchronous (real-time) audio-video encounter. The patient (or guardian if applicable) is aware that this is a billable service, which includes applicable co-pays. This Virtual Visit was conducted with patient's (and/or legal guardian's) consent. Patient identification was verified, and a caregiver was present when appropriate.    The patient was located at Home: [address] [place] [address] [address]  Provider was located at [place] [id number] ([address] [place] [address]): [address] [facility],  [address] [facility] [address]         --MARIAA Knott - CNP

## 2023-06-27 ENCOUNTER — TELEMEDICINE (OUTPATIENT)
Age: 83
End: 2023-06-27
Payer: MEDICARE

## 2023-06-27 DIAGNOSIS — Z51.5 PALLIATIVE CARE PATIENT: ICD-10-CM

## 2023-06-27 DIAGNOSIS — R53.81 DEBILITY: ICD-10-CM

## 2023-06-27 DIAGNOSIS — G89.4 CHRONIC PAIN SYNDROME: ICD-10-CM

## 2023-06-27 DIAGNOSIS — J44.9 CHRONIC OBSTRUCTIVE PULMONARY DISEASE, UNSPECIFIED COPD TYPE (HCC): ICD-10-CM

## 2023-06-27 PROCEDURE — 99443 PR PHYS/QHP TELEPHONE EVALUATION 21-30 MIN: CPT | Performed by: NURSE PRACTITIONER

## 2023-06-27 RX ORDER — HYDROCODONE BITARTRATE AND ACETAMINOPHEN 5; 325 MG/1; MG/1
0.5 TABLET ORAL 3 TIMES DAILY
Qty: 45 TABLET | Refills: 0 | Status: SHIPPED | OUTPATIENT
Start: 2023-06-27 | End: 2023-07-27

## 2023-06-28 ASSESSMENT — ENCOUNTER SYMPTOMS
SHORTNESS OF BREATH: 1
ALLERGIC/IMMUNOLOGIC NEGATIVE: 1
GASTROINTESTINAL NEGATIVE: 1

## 2023-06-30 ENCOUNTER — TELEPHONE (OUTPATIENT)
Dept: PULMONOLOGY | Age: 83
End: 2023-06-30

## 2023-07-05 NOTE — TELEPHONE ENCOUNTER
Thanks for your note. She may proceed with both medications - These are very low doses and she was advised exactly how to take the medication during our office visit. Thanks!

## 2023-07-25 ENCOUNTER — TELEPHONE (OUTPATIENT)
Dept: PULMONOLOGY | Age: 83
End: 2023-07-25

## 2023-07-25 DIAGNOSIS — J43.2 CENTRILOBULAR EMPHYSEMA (HCC): Primary | ICD-10-CM

## 2023-07-25 DIAGNOSIS — J44.9 CHRONIC OBSTRUCTIVE PULMONARY DISEASE, UNSPECIFIED COPD TYPE (HCC): ICD-10-CM

## 2023-07-25 RX ORDER — PREDNISONE 10 MG/1
TABLET ORAL
Qty: 30 TABLET | Refills: 0 | Status: SHIPPED | OUTPATIENT
Start: 2023-07-25

## 2023-07-25 NOTE — TELEPHONE ENCOUNTER
TRIAGE CALL      Complaint: SOB/Coughing  Cough: yes  Productive:  yes  Bloody Sputum:  no  Increased SOB/Wheezing:  yes  Duration: 1 days  Fever/Chills: no  OTC Meds tried: none  Patient started coughing yesterday after being around someone spraying ant killer spray

## 2023-07-25 NOTE — TELEPHONE ENCOUNTER
Thanks for the amazingly detailed information. I have routed an RX for a prednisone taper as a starting point. Please kindly advise the patient of the RX routing to [place], [place]. Encourage use of her nebulizer medications up to QID as well as mucus pills. If we start this sooner than latter, we should be able to turn this around.

## 2023-07-25 NOTE — TELEPHONE ENCOUNTER
LOV 6/27/2023 Mrs Moseley-chronic pain, COPD, debility, palliative care//COPD, bronchiectasis, prior tobacco use    Call reporting increased sob & coughing, started yesterday after being around someone spraying ant/pest control solution. Contacted patient regarding c/o, notes she has high enough O2 levels, sob is at her baseline; notes she has exacerbations after being exposed to smells; yesterday a neighbor sprayed for ants on her front porch and didn't react at the time but last night started having a strong, croupy cough, very similar to how her exacerbations start, she started taking \"mucous pills\" today; wants to be sure she gets ahead of any symptoms & asking for recommendations. Has the rescue inhaler and normally uses approx once a day, \"has a machine to use if needed\" confirmed is nebulizer and advised to use BID. Please advise if any other interventions.

## 2023-07-26 NOTE — TELEPHONE ENCOUNTER
Advised patient of Rx and NP recommendations, grateful for call & Rx will call back if doesn't clear.

## 2023-08-08 ENCOUNTER — TELEMEDICINE (OUTPATIENT)
Age: 83
End: 2023-08-08
Payer: MEDICARE

## 2023-08-08 DIAGNOSIS — R53.81 DEBILITY: ICD-10-CM

## 2023-08-08 DIAGNOSIS — Z51.5 PALLIATIVE CARE PATIENT: ICD-10-CM

## 2023-08-08 DIAGNOSIS — G89.4 CHRONIC PAIN SYNDROME: Primary | ICD-10-CM

## 2023-08-08 DIAGNOSIS — J44.9 CHRONIC OBSTRUCTIVE PULMONARY DISEASE, UNSPECIFIED COPD TYPE (HCC): ICD-10-CM

## 2023-08-08 PROCEDURE — 99443 PR PHYS/QHP TELEPHONE EVALUATION 21-30 MIN: CPT | Performed by: NURSE PRACTITIONER

## 2023-08-08 RX ORDER — HYDROCODONE BITARTRATE AND ACETAMINOPHEN 5; 325 MG/1; MG/1
1 TABLET ORAL 3 TIMES DAILY
Qty: 90 TABLET | Refills: 0 | Status: SHIPPED | OUTPATIENT
Start: 2023-08-08 | End: 2023-09-07

## 2023-08-08 NOTE — PROGRESS NOTES
palliative care for ongoing support by the palliative care team. She is accompanied by her primary care daughter during this landline phone call. She has known COPD, bronchiectasis, prior tobacco use. Her goals include relief of pain and symptoms. She struggles with IBS diarrhea and does limit her activities. She is undergoing PT/OT for fatigue and dizziness issues. Daughter and patient reports some improvement, but slow. After completing the ATB therapy, her cough did improve. She does struggle with it episodes during the day. Has not     Review of Systems   Constitutional:  Negative for activity change, appetite change and fever. HENT: Negative. Respiratory:  Positive for shortness of breath. Cardiovascular: Negative. Gastrointestinal: Negative. Musculoskeletal: Negative. Skin: Negative. Allergic/Immunologic: Negative. Neurological: Negative. Psychiatric/Behavioral: Negative. All other systems reviewed and are negative. Objective   Physical Exam - Unable to complete an assessment due to limitations of virtual connectivity. On this date 8/8/2023 I have spent 35 minutes reviewing previous notes, test results and face to face with the patient discussing the diagnosis and importance of compliance with the treatment plan as well as documenting on the day of the visit. An electronic signature was used to authenticate this note.     --Yadira Marte, MARIAA - CNP

## 2023-08-16 ENCOUNTER — TELEPHONE (OUTPATIENT)
Dept: PULMONOLOGY | Age: 83
End: 2023-08-16

## 2023-08-16 RX ORDER — ROFLUMILAST 500 UG/1
TABLET ORAL
Qty: 90 TABLET | Refills: 3 | Status: SHIPPED | OUTPATIENT
Start: 2023-08-16

## 2023-08-16 NOTE — TELEPHONE ENCOUNTER
Patient is due to order her Duloxetine DR 60mg. Kyra Celaya was telling them that she would look for another medicine to give her instead of this one.   Has not heard from Kyra Celaya

## 2023-08-16 NOTE — TELEPHONE ENCOUNTER
Patient Refill Request     Last Office Visit: 3/29/23 with Tone Galicia and 8/8/23 with Opal Smith     When they were supposed to follow up: 4 months w/ Tone Galicia and PRN w/ Opal Smith     Upcoming Office Visit: 8/29/23 w/ Tone Galicia and 9/12/23 with Epi Davidson Requested: Scott     Type of refill: 90 day     [facility]     Is prescription pended?  Yes

## 2023-08-20 DIAGNOSIS — J44.9 CHRONIC OBSTRUCTIVE PULMONARY DISEASE, UNSPECIFIED COPD TYPE (HCC): ICD-10-CM

## 2023-08-21 RX ORDER — FLUTICASONE FUROATE, UMECLIDINIUM BROMIDE AND VILANTEROL TRIFENATATE 100; 62.5; 25 UG/1; UG/1; UG/1
POWDER RESPIRATORY (INHALATION)
Qty: 3 EACH | Refills: 3 | Status: SHIPPED | OUTPATIENT
Start: 2023-08-21 | End: 2023-08-29 | Stop reason: SDUPTHER

## 2023-08-21 NOTE — TELEPHONE ENCOUNTER
Patient Refill Request     Last Office Visit: 3/29/23 with Thalia Somers and 8/8/23 with Osmani Boss     When they were supposed to follow up: 4 months w/ Thalia Somers and PRN w/ Osmani Boss     Upcoming Office Visit: 8/29/23 w/ Thalia Somers and 9/12/23 with Ervin Foote Requested: Trelegy     Type of refill: 90 day     [facility]     Is prescription pended?  Yes

## 2023-08-22 ENCOUNTER — TELEPHONE (OUTPATIENT)
Dept: PULMONOLOGY | Age: 83
End: 2023-08-22

## 2023-08-22 NOTE — TELEPHONE ENCOUNTER
Called and spoke with the patient's daughter Karthik Ridley) explain ton her as per Ms. Luis Mohamud she is still reviewing the medications available. Her allergy list poses some challenges. Will talk with them during pt next office encounter to identify possible other medications. She can opt to continue her current medication OR consider weaning off over the course of the next 6 week . Karthik Ridley stated they will continue the medication  Duloxetine DR 60mg until the next visit on 09/12/2023. Patient voice understanding.  Janie VERDUZCO

## 2023-08-22 NOTE — TELEPHONE ENCOUNTER
Apologies for the delay - Efforts to identify another medication have been problematic given her long list of allergies. Will wait to discuss with the patient during our next visit.

## 2023-08-28 NOTE — PROGRESS NOTES
She is an 79-year-old female w/ hx of COPD, bronchiectasis, upper lobe predominant emphysema, prior tobacco use who is seen today in routine follow up. DIAGNOSTICS:       CPFT's 8/2015-flow volume loop reveals airway obstruction, no significant bronchodilator response, lung volumes were low normal.  Diffusion capacity was decreased, study is consistent with COPD. Spirometry 9/2017-moderate obstructive defect with decreased FVC. Chest CT 12/2017-stable right upper lobe groundglass nodule which has been stable dating back to February 2012. Improving atelectasis in bilateral lower lobes. Stable mild-moderate centrilobular emphysematous changes in the upper lobes. Spirometry 9/18/2018-moderate obstructive defect with decreased FVC. No significant interval change. Spirometry 3/21/2019-moderately severe restrictive defect with obstructive pattern on expiratory loop. Slight but statistically insignificant improvement in FVC and FEV1. Previous CPFT's demonstrated obstructive defect. CT of chest 2/4/20208203-[id number] for PE. Severe emphysema, increased interstitial markings bilaterally, bibasilar atelectasis or scarring. Probable acute superimposed interstitial pneumonitis and alveolitis. Chest CT 5/1/2020- upper lobe predominant centrilobular emphysema, no consolidation or infiltrate. Lymph nodes normal.  O2 sat 5/6/2020 - at patient's home, RA rest 98%, heart rate 68 - 78;  RA w/ exertion 94%, heart rate 110  Spirometry 11/9/2020-moderate restrictive defect, technically limited study, no significant interval change in FVC and FEV. Spirometry 9/2022 - flow volume loop hast late scooping suggesting obstruction. Spirometry suggests restriction with concomitnat mild obstruction at low lung volumes.

## 2023-08-29 ENCOUNTER — OFFICE VISIT (OUTPATIENT)
Dept: PULMONOLOGY | Age: 83
End: 2023-08-29
Payer: MEDICARE

## 2023-08-29 VITALS
HEIGHT: 63 IN | DIASTOLIC BLOOD PRESSURE: 68 MMHG | WEIGHT: 125 LBS | TEMPERATURE: 97.4 F | HEART RATE: 66 BPM | RESPIRATION RATE: 18 BRPM | BODY MASS INDEX: 22.15 KG/M2 | SYSTOLIC BLOOD PRESSURE: 124 MMHG | OXYGEN SATURATION: 96 %

## 2023-08-29 DIAGNOSIS — G47.34 NOCTURNAL HYPOXIA: Chronic | ICD-10-CM

## 2023-08-29 DIAGNOSIS — J47.9 BRONCHIECTASIS WITHOUT COMPLICATION (HCC): Chronic | ICD-10-CM

## 2023-08-29 DIAGNOSIS — F17.211 CIGARETTE NICOTINE DEPENDENCE IN REMISSION: ICD-10-CM

## 2023-08-29 DIAGNOSIS — J44.9 MODERATE COPD (CHRONIC OBSTRUCTIVE PULMONARY DISEASE) (HCC): Primary | Chronic | ICD-10-CM

## 2023-08-29 DIAGNOSIS — J43.2 CENTRILOBULAR EMPHYSEMA (HCC): Chronic | ICD-10-CM

## 2023-08-29 PROBLEM — J96.11 CHRONIC RESPIRATORY FAILURE WITH HYPOXIA (HCC): Chronic | Status: ACTIVE | Noted: 2020-05-06

## 2023-08-29 PROCEDURE — G8427 DOCREV CUR MEDS BY ELIG CLIN: HCPCS | Performed by: NURSE PRACTITIONER

## 2023-08-29 PROCEDURE — 1036F TOBACCO NON-USER: CPT | Performed by: NURSE PRACTITIONER

## 2023-08-29 PROCEDURE — 3023F SPIROM DOC REV: CPT | Performed by: NURSE PRACTITIONER

## 2023-08-29 PROCEDURE — 3074F SYST BP LT 130 MM HG: CPT | Performed by: NURSE PRACTITIONER

## 2023-08-29 PROCEDURE — G8399 PT W/DXA RESULTS DOCUMENT: HCPCS | Performed by: NURSE PRACTITIONER

## 2023-08-29 PROCEDURE — 99214 OFFICE O/P EST MOD 30 MIN: CPT | Performed by: NURSE PRACTITIONER

## 2023-08-29 PROCEDURE — G8420 CALC BMI NORM PARAMETERS: HCPCS | Performed by: NURSE PRACTITIONER

## 2023-08-29 PROCEDURE — 1090F PRES/ABSN URINE INCON ASSESS: CPT | Performed by: NURSE PRACTITIONER

## 2023-08-29 PROCEDURE — 1123F ACP DISCUSS/DSCN MKR DOCD: CPT | Performed by: NURSE PRACTITIONER

## 2023-08-29 PROCEDURE — 3078F DIAST BP <80 MM HG: CPT | Performed by: NURSE PRACTITIONER

## 2023-08-29 RX ORDER — ALBUTEROL SULFATE 2.5 MG/3ML
SOLUTION RESPIRATORY (INHALATION)
Qty: 1080 ML | Refills: 3 | Status: SHIPPED | OUTPATIENT
Start: 2023-08-29

## 2023-08-29 RX ORDER — MONTELUKAST SODIUM 10 MG/1
TABLET ORAL
Qty: 90 TABLET | Refills: 3 | Status: SHIPPED | OUTPATIENT
Start: 2023-08-29

## 2023-08-29 RX ORDER — ALBUTEROL SULFATE 90 UG/1
2 AEROSOL, METERED RESPIRATORY (INHALATION) EVERY 4 HOURS PRN
Qty: 3 EACH | Refills: 3 | Status: SHIPPED | OUTPATIENT
Start: 2023-08-29 | End: 2023-11-27

## 2023-08-29 RX ORDER — FLUTICASONE FUROATE, UMECLIDINIUM BROMIDE AND VILANTEROL TRIFENATATE 100; 62.5; 25 UG/1; UG/1; UG/1
POWDER RESPIRATORY (INHALATION)
Qty: 3 EACH | Refills: 3 | Status: SHIPPED | OUTPATIENT
Start: 2023-08-29

## 2023-08-29 ASSESSMENT — ENCOUNTER SYMPTOMS
COUGH: 1
HEMOPTYSIS: 0
SPUTUM PRODUCTION: 1
WHEEZING: 0
SHORTNESS OF BREATH: 1

## 2023-08-29 NOTE — PATIENT INSTRUCTIONS
Continue Trelegy 1 inhalation every morning, rinse mouth after use. Albuterol via nebulizer or inhaler 4 times daily if needed for shortness of breath, wheezing, cough/chest congestion or mucus clearance. OTC mucolytic, (mucinex or generic equivalent of guaifenesin), 2400mg in 24 hours in divided doses as needed to thin mucous. Continue daliresp daily. Continue oxygen with sleep as prescribed. Recommend newest COVID booster per CDC guidelines, flu vaccine in the next month. She is up-to-date on pneumonia vaccines.

## 2023-08-29 NOTE — PROGRESS NOTES
[address] [place] Sheehan. [address] [facility], [address] [place] [address]  [phone]    Patient Name:  Yuki Gagnon    YOB: 1940    Office Visit 8/29/2023      CHIEF COMPLAINT:      Chief Complaint   Patient presents with    COPD         ASSESSMENT:   (Medical Decision Making)                                                                                                                                          Encounter Diagnoses   Name Primary? Moderate COPD (chronic obstructive pulmonary disease) (HCC) Yes    Centrilobular emphysema (HCC)     Bronchiectasis without complication (HCC)     Nocturnal hypoxia     Cigarette nicotine dependence in remission      She is stable symptomatically and reports compliance with Trelegy inhaler. Typically uses albuterol via nebulizer once daily, no particular time and believes that this has been beneficial.    Compliant with Eduardo Savage, believes that this has been instrumental in decreasing exacerbations. Had rhonchi left lung base on exam, was able to expectorate and rhonchi cleared. She is compliant with oxygen with sleep. She remains tobacco free. She does not meet criteria for screening CT. PLAN:     Continue Trelegy 1 inhalation every morning, rinse mouth after use. Albuterol via nebulizer or inhaler 4 times daily if needed for shortness of breath, wheezing, cough/chest congestion or mucus clearance. OTC mucolytic, (mucinex or generic equivalent of guaifenesin), 2400mg in 24 hours in divided doses as needed to thin mucous. Continue daliresp daily. Continue oxygen with sleep as prescribed. Recommend newest COVID booster when available per CDC guidelines, and flu vaccine in the next month. She is up-to-date on pneumonia vaccines.     Orders Placed This Encounter   Medications    albuterol sulfate HFA (PROVENTIL;VENTOLIN;PROAIR) 108 (90 Base) MCG/ACT inhaler     Sig: Inhale 2 puffs into the

## 2023-09-12 ENCOUNTER — TELEMEDICINE (OUTPATIENT)
Age: 83
End: 2023-09-12
Payer: MEDICARE

## 2023-09-12 DIAGNOSIS — R53.81 DEBILITY: ICD-10-CM

## 2023-09-12 DIAGNOSIS — Z51.5 PALLIATIVE CARE PATIENT: ICD-10-CM

## 2023-09-12 DIAGNOSIS — R10.9 ABDOMINAL PAIN, UNSPECIFIED ABDOMINAL LOCATION: Primary | ICD-10-CM

## 2023-09-12 DIAGNOSIS — G89.4 CHRONIC PAIN SYNDROME: ICD-10-CM

## 2023-09-12 DIAGNOSIS — J44.9 CHRONIC OBSTRUCTIVE PULMONARY DISEASE, UNSPECIFIED COPD TYPE (HCC): ICD-10-CM

## 2023-09-12 PROCEDURE — 1090F PRES/ABSN URINE INCON ASSESS: CPT | Performed by: NURSE PRACTITIONER

## 2023-09-12 PROCEDURE — 99214 OFFICE O/P EST MOD 30 MIN: CPT | Performed by: NURSE PRACTITIONER

## 2023-09-12 PROCEDURE — 1036F TOBACCO NON-USER: CPT | Performed by: NURSE PRACTITIONER

## 2023-09-12 PROCEDURE — G8420 CALC BMI NORM PARAMETERS: HCPCS | Performed by: NURSE PRACTITIONER

## 2023-09-12 PROCEDURE — 3023F SPIROM DOC REV: CPT | Performed by: NURSE PRACTITIONER

## 2023-09-12 PROCEDURE — G8427 DOCREV CUR MEDS BY ELIG CLIN: HCPCS | Performed by: NURSE PRACTITIONER

## 2023-09-12 PROCEDURE — 1123F ACP DISCUSS/DSCN MKR DOCD: CPT | Performed by: NURSE PRACTITIONER

## 2023-09-12 PROCEDURE — G8399 PT W/DXA RESULTS DOCUMENT: HCPCS | Performed by: NURSE PRACTITIONER

## 2023-09-12 RX ORDER — PREDNISONE 10 MG/1
10 TABLET ORAL DAILY
Qty: 14 TABLET | Refills: 0 | Status: SHIPPED | OUTPATIENT
Start: 2023-09-12 | End: 2023-09-26

## 2023-09-12 NOTE — PROGRESS NOTES
Bereket Draper, was evaluated through a synchronous (real-time) audio-video encounter. The patient (or guardian if applicable) is aware that this is a billable service, which includes applicable co-pays. This Virtual Visit was conducted with patient's (and/or legal guardian's) consent. Patient identification was verified, and a caregiver was present when appropriate. The patient was located at Home: [address] [place] [id number]  Provider was located at [facility] (Appt Dept): [address]en Draper (:  1940) is a Established patient, presenting virtually for evaluation of the following:    Assessment & Plan   Below is the assessment and plan developed based on review of pertinent history, physical exam, labs, studies, and medications. 1. Abdominal pain, unspecified abdominal location - spontaneously resolving    2. Chronic obstructive pulmonary disease, unspecified COPD type ([address]) - recent exacerbation. Initiated prednisone. -     predniSONE (DELTASONE) 10 MG tablet; Take 1 tablet by mouth daily for 14 days COPD exacerbation. Z51.5 Palliative Care, Disp-14 tablet, R-0Normal    3. Chronic pain syndrome - struggling with chronic pain and extremely limited due to allergies/intolerance. 4. Debility - Continue working to identify strategy to help reduce pain experience. Next steps would be compounding pharmacy strategy. 5. Palliative care patient  -     predniSONE (DELTASONE) 10 MG tablet; Take 1 tablet by mouth daily for 14 days COPD exacerbation. Z51.5 Palliative Care, Disp-14 tablet, R-0Normal    Return in about 2 months (around 2023). Patient to see Korin Brasher and I will be on standby if further palliative care . Subjective   Ms. Isabella Bone is a pleasant 19-year-old female seen in palliative care for ongoing support by the palliative care team. She has known COPD, bronchiectasis, prior tobacco use.  Her goals include relief of

## 2023-09-14 ENCOUNTER — TELEPHONE (OUTPATIENT)
Dept: PULMONOLOGY | Age: 83
End: 2023-09-14

## 2023-09-14 NOTE — TELEPHONE ENCOUNTER
TRIAGE CALL      Complaint: Coughing  Cough: yes  Productive:  yes  Bloody Sputum:  no  Increased SOB/Wheezing:  yes  Duration: 1 weeks  Fever/Chills: no  OTC Meds tried: none  Coughing up green stuff. Still on prednisone.  Chest is hurting when coughing

## 2023-09-14 NOTE — TELEPHONE ENCOUNTER
Patients daughter Trevon Ferro called and wanted Prince Halsted to know that the patient is having some nerve test  and also the neurologist put her on Lurica.  She ask if Prince Halsted had time please contact her     [phone]

## 2023-09-15 RX ORDER — DOXYCYCLINE HYCLATE 100 MG
100 TABLET ORAL 2 TIMES DAILY
Qty: 14 TABLET | Refills: 0 | Status: SHIPPED | OUTPATIENT
Start: 2023-09-15 | End: 2023-09-22

## 2023-09-15 RX ORDER — PREDNISONE 10 MG/1
TABLET ORAL
Qty: 30 TABLET | Refills: 0 | Status: SHIPPED | OUTPATIENT
Start: 2023-09-15

## 2023-09-15 NOTE — TELEPHONE ENCOUNTER
Last seen: 9/12/23 virtual  Hx: COPD, chronic pain, debility, IBS, palliative care    Noted recent COPD exacerbation at appt & 14 day course of 10mg prednisone prescribed. Call from family reporting 1 week of increased coughing that is productive, sputum is green, chest pain w/ cough, continues to take prednisone. 2nd note indicates zpack has not been helpful in the past.    Contacted daughter regarding current s/s w/ COPD exacerbation, using nebulizer TID since getting \"sick\", no s/s fevers, \"feels like it always does when there is infection\". Please advise.

## 2023-09-15 NOTE — TELEPHONE ENCOUNTER
Patient daughter Italo Woodson says that if an antibiotic is called in .  Please do not send in [facility] it does not help her

## 2023-09-15 NOTE — TELEPHONE ENCOUNTER
Advised daughter of MD recommendation, has enough 10mg tabs of prednisone on hand, gave mg instructions. & antibiotics sent in electronically.

## 2023-09-19 ENCOUNTER — TELEPHONE (OUTPATIENT)
Dept: PULMONOLOGY | Age: 83
End: 2023-09-19

## 2023-09-19 NOTE — TELEPHONE ENCOUNTER
Patients daughter Jignesh Pruett says that they were suppose to contact Macy Reich after seeing the Neurologist . Please let Amita Borja know to give her a call

## 2023-09-22 NOTE — TELEPHONE ENCOUNTER
Spoke with the patient & daughter regarding recent appt with neurology - Has opted out of CT scan and nerve conduction testing which seems appropriate given her wishes. Will initiate Lyrcia and taper off Cymbalta as a trial (drop to QD for the next week or two). We will check in again in 1-2 weeks for a brief conversation about this management plan.

## 2023-10-03 ENCOUNTER — SCHEDULED TELEPHONE ENCOUNTER (OUTPATIENT)
Age: 83
End: 2023-10-03

## 2023-10-03 DIAGNOSIS — G62.9 NEUROPATHY: Primary | ICD-10-CM

## 2023-10-03 DIAGNOSIS — Z51.5 PALLIATIVE CARE PATIENT: ICD-10-CM

## 2023-10-03 NOTE — PROGRESS NOTES
Attempted two tablets of Lyrica with some issues with s.e. Will try again when daughter returns. Using once a day dose of tramadol for pain control.

## 2023-10-13 ENCOUNTER — HOSPITAL ENCOUNTER (EMERGENCY)
Age: 83
Discharge: HOME | End: 2023-10-13
Payer: MEDICARE

## 2023-10-13 ENCOUNTER — APPOINTMENT (OUTPATIENT)
Dept: CT IMAGING | Age: 83
End: 2023-10-13
Payer: MEDICARE

## 2023-10-13 VITALS
RESPIRATION RATE: 18 BRPM | BODY MASS INDEX: 20.55 KG/M2 | DIASTOLIC BLOOD PRESSURE: 59 MMHG | OXYGEN SATURATION: 98 % | TEMPERATURE: 98.9 F | SYSTOLIC BLOOD PRESSURE: 137 MMHG | WEIGHT: 116 LBS | HEIGHT: 63 IN | HEART RATE: 91 BPM

## 2023-10-13 DIAGNOSIS — S16.1XXA STRAIN OF NECK MUSCLE, INITIAL ENCOUNTER: Primary | ICD-10-CM

## 2023-10-13 LAB
ALBUMIN SERPL-MCNC: 3.1 G/DL (ref 3.2–4.6)
ALBUMIN/GLOB SERPL: 0.9 (ref 0.4–1.6)
ALP SERPL-CCNC: 114 U/L (ref 50–136)
ALT SERPL-CCNC: 15 U/L (ref 12–65)
ANION GAP SERPL CALC-SCNC: 6 MMOL/L (ref 2–11)
AST SERPL-CCNC: 24 U/L (ref 15–37)
BASOPHILS # BLD: 0.1 K/UL (ref 0–0.2)
BASOPHILS NFR BLD: 1 % (ref 0–2)
BILIRUB SERPL-MCNC: 0.4 MG/DL (ref 0.2–1.1)
BUN SERPL-MCNC: 18 MG/DL (ref 8–23)
CALCIUM SERPL-MCNC: 9 MG/DL (ref 8.3–10.4)
CHLORIDE SERPL-SCNC: 105 MMOL/L (ref 101–110)
CO2 SERPL-SCNC: 24 MMOL/L (ref 21–32)
CREAT SERPL-MCNC: 1.1 MG/DL (ref 0.6–1)
DIFFERENTIAL METHOD BLD: ABNORMAL
EOSINOPHIL # BLD: 0.5 K/UL (ref 0–0.8)
EOSINOPHIL NFR BLD: 6 % (ref 0.5–7.8)
ERYTHROCYTE [DISTWIDTH] IN BLOOD BY AUTOMATED COUNT: 14.3 % (ref 11.9–14.6)
GLOBULIN SER CALC-MCNC: 3.3 G/DL (ref 2.8–4.5)
GLUCOSE SERPL-MCNC: 103 MG/DL (ref 65–100)
HCT VFR BLD AUTO: 35.2 % (ref 35.8–46.3)
HGB BLD-MCNC: 11.2 G/DL (ref 11.7–15.4)
IMM GRANULOCYTES # BLD AUTO: 0.1 K/UL (ref 0–0.5)
IMM GRANULOCYTES NFR BLD AUTO: 1 % (ref 0–5)
LYMPHOCYTES # BLD: 1.6 K/UL (ref 0.5–4.6)
LYMPHOCYTES NFR BLD: 21 % (ref 13–44)
MCH RBC QN AUTO: 29.2 PG (ref 26.1–32.9)
MCHC RBC AUTO-ENTMCNC: 31.8 G/DL (ref 31.4–35)
MCV RBC AUTO: 91.9 FL (ref 82–102)
MONOCYTES # BLD: 0.8 K/UL (ref 0.1–1.3)
MONOCYTES NFR BLD: 10 % (ref 4–12)
NEUTS SEG # BLD: 4.7 K/UL (ref 1.7–8.2)
NEUTS SEG NFR BLD: 61 % (ref 43–78)
NRBC # BLD: 0 K/UL (ref 0–0.2)
PLATELET # BLD AUTO: 372 K/UL (ref 150–450)
PMV BLD AUTO: 10 FL (ref 9.4–12.3)
POTASSIUM SERPL-SCNC: 4.2 MMOL/L (ref 3.5–5.1)
PROT SERPL-MCNC: 6.4 G/DL (ref 6.3–8.2)
RBC # BLD AUTO: 3.83 M/UL (ref 4.05–5.2)
SODIUM SERPL-SCNC: 135 MMOL/L (ref 133–143)
WBC # BLD AUTO: 7.6 K/UL (ref 4.3–11.1)

## 2023-10-13 PROCEDURE — 70450 CT HEAD/BRAIN W/O DYE: CPT

## 2023-10-13 PROCEDURE — 80053 COMPREHEN METABOLIC PANEL: CPT

## 2023-10-13 PROCEDURE — 72125 CT NECK SPINE W/O DYE: CPT

## 2023-10-13 PROCEDURE — 99284 EMERGENCY DEPT VISIT MOD MDM: CPT

## 2023-10-13 PROCEDURE — 6370000000 HC RX 637 (ALT 250 FOR IP)

## 2023-10-13 PROCEDURE — 85025 COMPLETE CBC W/AUTO DIFF WBC: CPT

## 2023-10-13 RX ORDER — HYDROCODONE BITARTRATE AND ACETAMINOPHEN 5; 325 MG/1; MG/1
1 TABLET ORAL
Status: COMPLETED | OUTPATIENT
Start: 2023-10-13 | End: 2023-10-13

## 2023-10-13 RX ORDER — HYDROCODONE BITARTRATE AND ACETAMINOPHEN 5; 325 MG/1; MG/1
1 TABLET ORAL EVERY 6 HOURS PRN
Qty: 7 TABLET | Refills: 0 | Status: SHIPPED | OUTPATIENT
Start: 2023-10-13 | End: 2023-10-16

## 2023-10-13 RX ORDER — HYDROCODONE BITARTRATE AND ACETAMINOPHEN 5; 325 MG/1; MG/1
1 TABLET ORAL EVERY 6 HOURS PRN
Qty: 7 TABLET | Refills: 0 | Status: SHIPPED | OUTPATIENT
Start: 2023-10-13 | End: 2023-10-13 | Stop reason: SDUPTHER

## 2023-10-13 RX ADMIN — HYDROCODONE BITARTRATE AND ACETAMINOPHEN 1 TABLET: 5; 325 TABLET ORAL at 16:35

## 2023-10-13 ASSESSMENT — LIFESTYLE VARIABLES
HOW OFTEN DO YOU HAVE A DRINK CONTAINING ALCOHOL: NEVER
HOW MANY STANDARD DRINKS CONTAINING ALCOHOL DO YOU HAVE ON A TYPICAL DAY: PATIENT DOES NOT DRINK

## 2023-10-13 ASSESSMENT — ENCOUNTER SYMPTOMS
ABDOMINAL PAIN: 0
SHORTNESS OF BREATH: 0
VOMITING: 0
COUGH: 0
NAUSEA: 0

## 2023-10-13 ASSESSMENT — PAIN SCALES - GENERAL: PAINLEVEL_OUTOF10: 8

## 2023-10-13 ASSESSMENT — PAIN DESCRIPTION - LOCATION: LOCATION: NECK;SHOULDER

## 2023-10-13 ASSESSMENT — PAIN DESCRIPTION - DESCRIPTORS: DESCRIPTORS: ACHING

## 2023-10-13 ASSESSMENT — PAIN DESCRIPTION - ORIENTATION: ORIENTATION: RIGHT;LEFT

## 2023-10-13 NOTE — ED NOTES
Assumed care of pt at this time. Pt reports neck and shoulder pain (4/10) (x 3 days). Pt reports hx of chronic back problems. Pt resting quietly in bed, attached to bedside monitor.       Han Lazaro RN  10/13/23 0240

## 2023-10-13 NOTE — DISCHARGE INSTRUCTIONS
Please take the Norco as prescribed to help with your neck pain. Use a heating pad to your neck to help relax your muscles. Do not take Ultram while you are taking the Norco.  Be sure to follow-up with orthopedics for close reevaluation. If your symptoms change or worsen, return immediately to the emergency department. Please remember that the Marcos Felt may make you feel unsteady. Be very careful while taking this medication to avoid falls.

## 2023-10-13 NOTE — ED PROVIDER NOTES
friction rub. No gallop. Pulmonary:      Effort: Pulmonary effort is normal. No respiratory distress. Breath sounds: Normal breath sounds. No wheezing or rales. Abdominal:      General: Abdomen is flat. There is no distension. Palpations: Abdomen is soft. Tenderness: There is no abdominal tenderness. There is no right CVA tenderness, left CVA tenderness or guarding. Musculoskeletal:         General: Normal range of motion. Cervical back: Normal range of motion and neck supple. Tenderness (left paraspinal tenderness) present. Skin:     General: Skin is warm and dry. Capillary Refill: Capillary refill takes less than 2 seconds. Neurological:      General: No focal deficit present. Mental Status: She is alert and oriented to person, place, and time. Mental status is at baseline. Cranial Nerves: No cranial nerve deficit. Sensory: No sensory deficit. Motor: No weakness. Coordination: Coordination normal.      Comments: No focal neurologic deficits appreciated. Psychiatric:         Mood and Affect: Mood normal.         Behavior: Behavior normal.         Thought Content: Thought content normal.         Judgment: Judgment normal.          Procedures     Procedures    Orders Placed This Encounter   Procedures    CT HEAD WO CONTRAST    CT CERVICAL SPINE WO CONTRAST    CBC with Auto Differential    Comprehensive Metabolic Panel    [address] [facility] (Spine Surgery)        Medications given during this emergency department visit:  Medications   HYDROcodone-acetaminophen (NORCO) 5-325 MG per tablet 1 tablet (1 tablet Oral Given 10/13/23 1635)       New Prescriptions    HYDROCODONE-ACETAMINOPHEN (NORCO) 5-325 MG PER TABLET    Take 1 tablet by mouth every 6 hours as needed for Pain for up to 3 days. Intended supply: 3 days.  Take lowest dose possible to manage pain Max Daily Amount: 4 tablets        Past Medical History:   Diagnosis Date of iterative reconstruction    FINDINGS:    Intracranial contents:    Wallene Duster white differentiation is preserved. Basal cisterns are patent. No   hemorrhage, extra-axial collection, or hydrocephalus. No CT evidence of acute   ischemia. No mass or mass effect. There is age-appropriate parenchymal volume   loss. Fairly prominent and partially confluent low attenuation in the   hemispheric white matter likely reflects the sequela of chronic microvascular   ischemia. Arterial atherosclerosis. No tonsillar ectopia. Bones and extracranial soft tissues:    Calvarium is intact. No fracture or acute osseous abnormality. Rightward nasal   septal deviation and spurring. Minimal paranasal sinus opacification. Bilateral   mastoid effusions. Globes and orbits are unremarkable. TECHNIQUE: Thin section axial noncontrast images were obtained through the   cervical spine. Sagittal and coronal reformatted images were created. Images   were reviewed in bone and soft tissue windows. FINDINGS:    Vertebral column:    Spinal alignment is anatomic. Craniocervical junction is normal. No acute   fracture. Vertebral body heights are maintained. Generalized osteopenia. C2-C3:  Midline disc bulge. No thecal sac or foraminal stenosis. C3-C4 : Anterior osteophytosis. Disc height loss, facet arthropathy and left   foraminal uncovertebral spurring. Moderate left foraminal stenosis. No thecal   sac or right foraminal stenosis. C4-C5:  Midline calcified disc protrusion. Foraminal uncovertebral spurring. Mild left foraminal stenosis. No significant thecal sac or right foraminal   stenosis. C5-C6:  Facet arthropathy and uncovertebral spurring. No significant thecal sac   or foraminal stenosis. C6-C7:  No significant central canal or foraminal narrowing. C7-T1:  No significant central canal or foraminal narrowing. Soft tissues:    Biapical emphysematous changes. Cervical soft tissues are unremarkable.       Impression

## 2023-10-13 NOTE — ED TRIAGE NOTES
Pt arrives via POV from home for complaint of acute on chronic neck pain that radiates to her right shoulder. Increased pain started on Sunday.
none

## 2023-10-26 ENCOUNTER — OFFICE VISIT (OUTPATIENT)
Dept: ORTHOPEDIC SURGERY | Age: 83
End: 2023-10-26
Payer: MEDICARE

## 2023-10-26 DIAGNOSIS — M54.2 CERVICAL SPINE PAIN: Primary | ICD-10-CM

## 2023-10-26 PROCEDURE — 1123F ACP DISCUSS/DSCN MKR DOCD: CPT | Performed by: PHYSICAL MEDICINE & REHABILITATION

## 2023-10-26 PROCEDURE — G8399 PT W/DXA RESULTS DOCUMENT: HCPCS | Performed by: PHYSICAL MEDICINE & REHABILITATION

## 2023-10-26 PROCEDURE — 1090F PRES/ABSN URINE INCON ASSESS: CPT | Performed by: PHYSICAL MEDICINE & REHABILITATION

## 2023-10-26 PROCEDURE — G8484 FLU IMMUNIZE NO ADMIN: HCPCS | Performed by: PHYSICAL MEDICINE & REHABILITATION

## 2023-10-26 PROCEDURE — 20553 NJX 1/MLT TRIGGER POINTS 3/>: CPT | Performed by: PHYSICAL MEDICINE & REHABILITATION

## 2023-10-26 PROCEDURE — 1036F TOBACCO NON-USER: CPT | Performed by: PHYSICAL MEDICINE & REHABILITATION

## 2023-10-26 PROCEDURE — G8420 CALC BMI NORM PARAMETERS: HCPCS | Performed by: PHYSICAL MEDICINE & REHABILITATION

## 2023-10-26 PROCEDURE — 99203 OFFICE O/P NEW LOW 30 MIN: CPT | Performed by: PHYSICAL MEDICINE & REHABILITATION

## 2023-10-26 PROCEDURE — G8428 CUR MEDS NOT DOCUMENT: HCPCS | Performed by: PHYSICAL MEDICINE & REHABILITATION

## 2023-10-26 RX ORDER — METHYLPREDNISOLONE ACETATE 40 MG/ML
160 INJECTION, SUSPENSION INTRA-ARTICULAR; INTRALESIONAL; INTRAMUSCULAR; SOFT TISSUE ONCE
Status: COMPLETED | OUTPATIENT
Start: 2023-10-26 | End: 2023-10-26

## 2023-10-26 RX ADMIN — METHYLPREDNISOLONE ACETATE 160 MG: 40 INJECTION, SUSPENSION INTRA-ARTICULAR; INTRALESIONAL; INTRAMUSCULAR; SOFT TISSUE at 09:09

## 2023-10-26 NOTE — PROGRESS NOTES
Date:  10/26/23     HPI:  I am seeing Jose Villafana in evalution/folowup. I am seeing her primarily for cervical spine pain. She does have a history of chronic pain, but was doing well until around 3 weeks ago. She went to sleep and awoke with pain in the middle to lower cervical paraspinal areas that gravitates toward the vertex and into the occiput. Significant pain, probably dull and sharp. It is nonradiating into the upper extremities. It hurts when she turns her head to either side or flexes or extends her neck. Is better with lying down or resting. She is in palliative care, essentially step above hospice. She has COPD. She takes Tylenol and also has been given tramadol by her palliative care specialist.  She can take up to 4-day but sounds like if she takes more than 2 it might cause a little bit of dizziness or unsteadiness. 2 a day does tend to have some benefit. She has had physical therapy, injections, other interventions. CT scanning of the cervical spine reveals relatively mild degenerative changes. Is also noted she has some bifrontal headaches with all of this and CT scanning of the brain reveals age related changes with no acute or significant pathology. As an aside, she also has lumbosacral spine pain was been through numerous injections, failed spinal cord stimulator implantation. This is being managed by her palliative care specialist.  From initial review of her symptoms does sound that is anything I can add or offer to her care that have been tried before.     Past Medical History:   Diagnosis Date    Acute bacterial sinusitis 5/24/2017    Adenoma of left adrenal gland     Allergic rhinitis     Arthritis     Asthma     Bronchiectasis (HCC)     Cholelithiasis     Chronic back pain     Chronic obstructive pulmonary disease (COPD) (HCC)     Chronic pain syndrome     Chronic renal insufficiency     Chronic restrictive lung disease     Congestive heart failure (CHF) ([address])

## 2023-11-02 ENCOUNTER — SCHEDULED TELEPHONE ENCOUNTER (OUTPATIENT)
Age: 83
End: 2023-11-02
Payer: MEDICARE

## 2023-11-02 DIAGNOSIS — Z51.5 PALLIATIVE CARE PATIENT: ICD-10-CM

## 2023-11-02 DIAGNOSIS — G62.9 NEUROPATHY: Primary | ICD-10-CM

## 2023-11-02 PROCEDURE — 99443 PR PHYS/QHP TELEPHONE EVALUATION 21-30 MIN: CPT | Performed by: NURSE PRACTITIONER

## 2023-11-02 RX ORDER — PREGABALIN 100 MG/1
100 CAPSULE ORAL 2 TIMES DAILY
COMMUNITY
End: 2023-11-02 | Stop reason: SDUPTHER

## 2023-11-02 RX ORDER — PREGABALIN 100 MG/1
100 CAPSULE ORAL 2 TIMES DAILY
Qty: 60 CAPSULE | Refills: 2 | Status: SHIPPED | OUTPATIENT
Start: 2023-11-02 | End: 2024-01-31

## 2023-11-02 NOTE — PROGRESS NOTES
Phone conversation with the patient + daughter regarding the use of Lyrica for her debilitating neuropathy. Reviewed events related to the ED + Ortho regarding acute onset of neck pain that was limiting ROM. Since the visit to the ortho MD, she has had NO neck pain is really feeling well. She has not needed to take any tylenol/tramadol at this point. She is very thankful for the help from the Ortho for that pain. She is tolerating the [place] well at this point - No SE and reports of NO neuropathic pain. We will schedule an virtual call in 6 weeks to continue monitoring and assisting.

## 2023-11-06 ENCOUNTER — TELEPHONE (OUTPATIENT)
Dept: PULMONOLOGY | Age: 83
End: 2023-11-06

## 2023-11-06 NOTE — TELEPHONE ENCOUNTER
Patients daughter Luis Felipe Tan  called says they believe that the patient is having side effects from the new med she started 3 wks ago. Her feet have swollen, red and hot to the touch . Disp Refills Start End    pregabalin (LYRICA) 100 MG capsule 60 capsule 2 11/2/2023 1/31/2024    Sig -         Patient went  to a orthopedic doctor last week and they gave 4 shots of steroids . They are not sure what has caused the swelling .  She is also having problem  breathing

## 2023-11-06 NOTE — TELEPHONE ENCOUNTER
Daughter is calling back asking for patient to be seen today.   Asking to see if Osmani Boss can call her back today

## 2023-11-07 ENCOUNTER — HOSPITAL ENCOUNTER (EMERGENCY)
Age: 83
Discharge: HOME | End: 2023-11-07
Attending: EMERGENCY MEDICINE
Payer: MEDICARE

## 2023-11-07 ENCOUNTER — APPOINTMENT (OUTPATIENT)
Dept: GENERAL RADIOLOGY | Age: 83
End: 2023-11-07
Payer: MEDICARE

## 2023-11-07 VITALS
DIASTOLIC BLOOD PRESSURE: 80 MMHG | OXYGEN SATURATION: 98 % | HEART RATE: 82 BPM | TEMPERATURE: 98.1 F | SYSTOLIC BLOOD PRESSURE: 183 MMHG | RESPIRATION RATE: 22 BRPM

## 2023-11-07 DIAGNOSIS — R60.0 LEG EDEMA: Primary | ICD-10-CM

## 2023-11-07 LAB
ALBUMIN SERPL-MCNC: 3.6 G/DL (ref 3.2–4.6)
ALBUMIN/GLOB SERPL: 1.1 (ref 0.4–1.6)
ALP SERPL-CCNC: 97 U/L (ref 50–136)
ALT SERPL-CCNC: 18 U/L (ref 12–65)
ANION GAP SERPL CALC-SCNC: 7 MMOL/L (ref 2–11)
AST SERPL-CCNC: 18 U/L (ref 15–37)
BILIRUB SERPL-MCNC: 0.6 MG/DL (ref 0.2–1.1)
BUN SERPL-MCNC: 28 MG/DL (ref 8–23)
CALCIUM SERPL-MCNC: 9.2 MG/DL (ref 8.3–10.4)
CHLORIDE SERPL-SCNC: 109 MMOL/L (ref 101–110)
CO2 SERPL-SCNC: 23 MMOL/L (ref 21–32)
CREAT SERPL-MCNC: 1 MG/DL (ref 0.6–1)
ERYTHROCYTE [DISTWIDTH] IN BLOOD BY AUTOMATED COUNT: 14.6 % (ref 11.9–14.6)
GLOBULIN SER CALC-MCNC: 3.3 G/DL (ref 2.8–4.5)
GLUCOSE SERPL-MCNC: 104 MG/DL (ref 65–100)
HCT VFR BLD AUTO: 36 % (ref 35.8–46.3)
HGB BLD-MCNC: 11.4 G/DL (ref 11.7–15.4)
MCH RBC QN AUTO: 28 PG (ref 26.1–32.9)
MCHC RBC AUTO-ENTMCNC: 31.7 G/DL (ref 31.4–35)
MCV RBC AUTO: 88.5 FL (ref 82–102)
NRBC # BLD: 0 K/UL (ref 0–0.2)
NT PRO BNP: 553 PG/ML
PLATELET # BLD AUTO: 329 K/UL (ref 150–450)
PMV BLD AUTO: 9.6 FL (ref 9.4–12.3)
POTASSIUM SERPL-SCNC: 4.4 MMOL/L (ref 3.5–5.1)
PROT SERPL-MCNC: 6.9 G/DL (ref 6.3–8.2)
RBC # BLD AUTO: 4.07 M/UL (ref 4.05–5.2)
SODIUM SERPL-SCNC: 139 MMOL/L (ref 133–143)
WBC # BLD AUTO: 11.8 K/UL (ref 4.3–11.1)

## 2023-11-07 PROCEDURE — 80053 COMPREHEN METABOLIC PANEL: CPT

## 2023-11-07 PROCEDURE — 85027 COMPLETE CBC AUTOMATED: CPT

## 2023-11-07 PROCEDURE — 93005 ELECTROCARDIOGRAM TRACING: CPT | Performed by: EMERGENCY MEDICINE

## 2023-11-07 PROCEDURE — 99285 EMERGENCY DEPT VISIT HI MDM: CPT

## 2023-11-07 PROCEDURE — 83880 ASSAY OF NATRIURETIC PEPTIDE: CPT

## 2023-11-07 PROCEDURE — 71046 X-RAY EXAM CHEST 2 VIEWS: CPT

## 2023-11-07 RX ORDER — FUROSEMIDE 20 MG/1
20 TABLET ORAL DAILY
Qty: 14 TABLET | Refills: 0 | Status: SHIPPED | OUTPATIENT
Start: 2023-11-07 | End: 2023-11-21

## 2023-11-07 ASSESSMENT — LIFESTYLE VARIABLES
HOW MANY STANDARD DRINKS CONTAINING ALCOHOL DO YOU HAVE ON A TYPICAL DAY: PATIENT DOES NOT DRINK
HOW OFTEN DO YOU HAVE A DRINK CONTAINING ALCOHOL: NEVER

## 2023-11-07 ASSESSMENT — ENCOUNTER SYMPTOMS
BACK PAIN: 0
SHORTNESS OF BREATH: 1
ABDOMINAL PAIN: 0
COUGH: 0

## 2023-11-07 ASSESSMENT — PAIN SCALES - GENERAL: PAINLEVEL_OUTOF10: 0

## 2023-11-07 NOTE — ED PROVIDER NOTES
Peripheral vascular disease ([address])     Primary hypothyroidism     Proteinuria     Pruritic disorder     Spinal stenosis     Stenosis of right carotid artery     Trigger thumb     Type 2 diabetes mellitus (HCC)     Venous stasis         Past Surgical History:   Procedure Laterality Date    APPENDECTOMY      BACK SURGERY  2015    SCS trial    CAROTID ENDARTERECTOMY Right 2014    CAROTID ENDARTERECTOMY Left 2014    CATARACT REMOVAL Bilateral 2014    CYST REMOVAL  2000    back    LUMBAR FUSION      MYRINGOTOMY Right     x2    FRANCINE AND BSO (CERVIX REMOVED)  1978        Family History   Problem Relation Age of Onset    Breast Cancer Mother     Cancer Sister         uterine    Heart Disease Father     Cancer Mother     Diabetes Brother     Heart Attack Father         Social History     Socioeconomic History    Marital status:    Tobacco Use    Smoking status: Former     Packs/day: 1.00     Years: 30.00     Additional pack years: 0.00     Total pack years: 30.00     Types: Cigarettes     Quit date: 2011     Years since quittin.9    Smokeless tobacco: Never    Tobacco comments:     Quit smoking: quit    Substance and Sexual Activity    Alcohol use: No     Alcohol/week: 0.0 standard drinks of alcohol    Drug use: No   Social History Narrative    Lives alone, granddaughter lives next door. 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          Allergies: Penicillins, Sulfa antibiotics, Aripiprazole, Atorvastatin, Bupropion, Gabapentin, Lisinopril, Simvastatin, Trazodone, and Varenicline    Previous Medications    ACETAMINOPHEN (TYLENOL) 500 MG TABLET    Take 1 tablet by mouth every 6 hours as needed    ALBUTEROL (PROVENTIL) (2.5 MG/3ML) 0.083% NEBULIZER SOLUTION    3 ml via nebulizer qid prn shortness of breath, wheezing, cough/chest congestion and mucous clearance    ALBUTEROL SULFATE HFA (PROVENTIL;VENTOLIN;PROAIR) 108 (90 BASE) MCG/ACT INHALER    Inhale 2 puffs

## 2023-11-07 NOTE — ED TRIAGE NOTES
Pt presents to triage c/o LE swelling and pain xs 5 days. Pt reports she was recently started on lyrical and steroid injections and is concern the swelling is related.  Pt reports HX of CHF, not currently prescribed diuretics

## 2023-11-07 NOTE — TELEPHONE ENCOUNTER
Able to reach daughter, reports that on Friday patient let her know feet were swollen, by the time the daughter was able to assess them Sunday and they were very swollen, unable to discern ankles etc.; per patient they didn't start this swollen it was progressive over the weekend. Only new medications were 3 weeks ago starting lyrica, and some steroid shots in her neck by an orthopedic doctor, done last week to help w/ neck stiffness/discomfort. Patient had concerns that patient's breathing was affected, and daughter notes that when patient is upset or worried her breathing is not as good. Went to dentist yesterday and they commented on her breathing, and would not see her for dental procedure due to concerns. Daughter does not think this is worse than her normal COPD issues, but acknowledges she may just be used to seeing this. Her main concern is the swelling, redness and and edema is up to her calves. Per patient this happened one time years ago w/ a similar reaction to gabapentin. Has needed diuretics in the past but not a current med. Has been told she has CHF. Does not see her PCP very often b/c she is seen in palliative. O2 levels have been fine. Still only needing O2 @ HS. Last does of lyrica was Friday, swelling has not gone down since. Requesting her be seen, partially for patient's sense of well being. Text message to palliative NP who replies she can review issue this afternoon. Advised daughter that patient will not be seen today but NP will review issue. She states she feels like she should take patient to the emergency room as patient now feels like she has some knots in her throat.

## 2023-11-08 LAB
EKG ATRIAL RATE: 84 BPM
EKG DIAGNOSIS: NORMAL
EKG P AXIS: 62 DEGREES
EKG P-R INTERVAL: 124 MS
EKG Q-T INTERVAL: 374 MS
EKG QRS DURATION: 74 MS
EKG QTC CALCULATION (BAZETT): 441 MS
EKG R AXIS: 8 DEGREES
EKG T AXIS: 30 DEGREES
EKG VENTRICULAR RATE: 84 BPM

## 2023-11-08 PROCEDURE — 93010 ELECTROCARDIOGRAM REPORT: CPT | Performed by: INTERNAL MEDICINE

## 2023-11-08 NOTE — TELEPHONE ENCOUNTER
Spoke with Ms. Morelia Krueger who states that she can have pt here Friday at [id number] [facility] in person.

## 2023-11-08 NOTE — TELEPHONE ENCOUNTER
Unable to access Epic until this afternoon due to process issues with the system. Thank you for the detailed information. Noted she did indeed seek the ED yesterday and was fully evaluated. Thankful for that team's efforts. BNP up to 553 at visit. She was placed on furosemide 20mg QD x 14 days. Unlikely the Lyrica is the source of the edema given it did not improve over 5 days - Clearance is approximately 48 hours for entire elimination. ED VS do not seem to list a weight. As PC patient, we have only been seeing her predominately through telehealth platform. Last weight via in office visit with Mrs. Kerri Meier was 125 pds. Please advise patient/daughter to weight daily at the same time with the same clothing and set up a virtual call from Friday, 11/8 at 10am - I would prefer to see them in the office though, if they could make it. Please advise.  NP will come into the office for in-person visit

## 2023-11-10 ENCOUNTER — OFFICE VISIT (OUTPATIENT)
Age: 83
End: 2023-11-10
Payer: MEDICARE

## 2023-11-10 VITALS
TEMPERATURE: 97.2 F | WEIGHT: 120 LBS | HEART RATE: 87 BPM | OXYGEN SATURATION: 98 % | HEIGHT: 63 IN | RESPIRATION RATE: 18 BRPM | DIASTOLIC BLOOD PRESSURE: 78 MMHG | BODY MASS INDEX: 21.26 KG/M2 | SYSTOLIC BLOOD PRESSURE: 153 MMHG

## 2023-11-10 DIAGNOSIS — Z51.5 PALLIATIVE CARE PATIENT: ICD-10-CM

## 2023-11-10 DIAGNOSIS — G89.4 CHRONIC PAIN SYNDROME: ICD-10-CM

## 2023-11-10 DIAGNOSIS — R06.00 DYSPNEA, UNSPECIFIED TYPE: ICD-10-CM

## 2023-11-10 DIAGNOSIS — R60.0 LOCALIZED EDEMA: ICD-10-CM

## 2023-11-10 DIAGNOSIS — I50.9 CONGESTIVE HEART FAILURE, UNSPECIFIED HF CHRONICITY, UNSPECIFIED HEART FAILURE TYPE (HCC): Primary | ICD-10-CM

## 2023-11-10 DIAGNOSIS — R53.81 DEBILITY: ICD-10-CM

## 2023-11-10 PROCEDURE — 1036F TOBACCO NON-USER: CPT | Performed by: NURSE PRACTITIONER

## 2023-11-10 PROCEDURE — 1123F ACP DISCUSS/DSCN MKR DOCD: CPT | Performed by: NURSE PRACTITIONER

## 2023-11-10 PROCEDURE — G8420 CALC BMI NORM PARAMETERS: HCPCS | Performed by: NURSE PRACTITIONER

## 2023-11-10 PROCEDURE — G8427 DOCREV CUR MEDS BY ELIG CLIN: HCPCS | Performed by: NURSE PRACTITIONER

## 2023-11-10 PROCEDURE — G8484 FLU IMMUNIZE NO ADMIN: HCPCS | Performed by: NURSE PRACTITIONER

## 2023-11-10 PROCEDURE — 99215 OFFICE O/P EST HI 40 MIN: CPT | Performed by: NURSE PRACTITIONER

## 2023-11-10 PROCEDURE — 3078F DIAST BP <80 MM HG: CPT | Performed by: NURSE PRACTITIONER

## 2023-11-10 PROCEDURE — 1090F PRES/ABSN URINE INCON ASSESS: CPT | Performed by: NURSE PRACTITIONER

## 2023-11-10 PROCEDURE — G8399 PT W/DXA RESULTS DOCUMENT: HCPCS | Performed by: NURSE PRACTITIONER

## 2023-11-10 PROCEDURE — 3077F SYST BP >= 140 MM HG: CPT | Performed by: NURSE PRACTITIONER

## 2023-11-10 NOTE — PROGRESS NOTES
Samantha Santana (:  1940) is a 79 y.o. female,Established patient, here for evaluation of the following chief complaint(s): Foot Swelling (Onset approximately 7 days ago. Seen in ED recently for swelling and placed on lasix.)         ASSESSMENT/PLAN:  1. Congestive heart failure, unspecified HF chronicity, unspecified heart failure type ([address])  -     Echo (TTE) complete (PRN contrast/bubble/strain/3D); Future   --there is no clear indication that the Lyrica caused the swelling in her feet - certainly possibly but has not resolved and she has been off of it for nearly 7 days. She is to remain on the furosemide 20mg QD through Tuesday. She is to weight daily at the same time/same clothes. Explaied that we may indeed need to STOP the medication because of the swelling, at which point she became very sad. Years and  years ago she recalls using gabapentin and having some swelling but wanted to try this cousin. We will have ongoing discussions on Tuesday of outcomes. 2. Dyspnea, unspecified type  -     Echo (TTE) complete (PRN contrast/bubble/strain/3D); Future   --No significant changes in her dyspnea. She does seem very sad today, crying intermittently. We will complete an echo to assess structural function. 3. Localized edema - Mild swelling noted along feet/ankles. Continue propping up and keep moving as much as possible. HALT stair climbing given the associated pain and numbness of the feet. Use the [facility] rather than rushing to the bathroom. 4. Chronic pain syndrome - she struggling with this diagnosis and has failed multiple medications. She was so pleased that she could see a response to the Lyrica    5. Debility - Recognize declining help and accompanying sadness. 6. Palliative care patient - Ongoing needs. -     Echo (TTE) complete (PRN contrast/bubble/strain/3D); Future    Return for Tuesday 12:15 pm for phone call.      Subjective   SUBJECTIVE/OBJECTIVE:  Ms. Loni Boss is a pleasant

## 2023-11-14 ENCOUNTER — TELEMEDICINE (OUTPATIENT)
Age: 83
End: 2023-11-14
Payer: MEDICARE

## 2023-11-14 DIAGNOSIS — Z51.5 PALLIATIVE CARE PATIENT: ICD-10-CM

## 2023-11-14 DIAGNOSIS — R60.0 LOCALIZED EDEMA: Primary | ICD-10-CM

## 2023-11-14 DIAGNOSIS — G89.4 CHRONIC PAIN SYNDROME: ICD-10-CM

## 2023-11-14 DIAGNOSIS — R53.81 DEBILITY: ICD-10-CM

## 2023-11-14 PROCEDURE — 99443 PR PHYS/QHP TELEPHONE EVALUATION 21-30 MIN: CPT | Performed by: NURSE PRACTITIONER

## 2023-11-14 NOTE — PROGRESS NOTES
Stefan Carpenter is a 79 y.o. female evaluated via telephone on 11/14/2023 for No chief complaint on file. Provider location: Office  Patient location: Home    Documentation:  I communicated with the patient and/or health care decision maker about swelling in the feet/ankle. .   Details of this discussion including any medical advice provided: Ongoing Lyrica + lasix    Follow up call    Ms. Lashay Steele is a pleasant 62-year-old female seen in palliative care for ongoing support by the palliative care team. She has known COPD, bronchiectasis, prior tobacco use, depression, anxiety, chronic pain syndrome, and debility. She has developed some ankle/feet edema and ended up going to the ED. Placed on lasix for 14 days - there was some concern that pregabalin was associated with the swelling so she had stopped the medication. However, she immediately experience a pain crisis and was truly distressed about not being able to use the medication. She resumed the medication on Friday and have not found the swelling return. She was taking 20mg of lasix until today - We will now HOLD the lasix, weight daily at the same time, continue the pregabalin while monitoring for swelling. Daughter has the direct number to the NP. Total Time: minutes: 21-30 minutes    Stefan Carpenter was evaluated through a synchronous (real-time) audio encounter. Patient identification was verified at the start of the visit. She (or guardian if applicable) is aware that this is a billable service, which includes applicable co-pays. This visit was conducted with the patient's (and/or legal guardian's) verbal consent. She has not had a related appointment within my department in the past 7 days or scheduled within the next 24 hours. The patient was located in a state where the provider was licensed to provide care.     Note: not billable if this call serves to triage the patient into an appointment for the relevant concern    MARIAA Virgen -

## 2023-11-14 NOTE — PATIENT INSTRUCTIONS
Thank you so much for allowing me to participate in your care! Call us at the office if you need anything!  [phone]  --Harsihl MCKAYN, RN

## 2023-11-16 ENCOUNTER — TELEPHONE (OUTPATIENT)
Age: 83
End: 2023-11-16

## 2023-11-16 DIAGNOSIS — R60.9 PERIPHERAL EDEMA: Primary | ICD-10-CM

## 2023-11-16 RX ORDER — FUROSEMIDE 20 MG/1
20 TABLET ORAL DAILY
Qty: 30 TABLET | Refills: 0 | Status: SHIPPED | OUTPATIENT
Start: 2023-11-16 | End: 2023-12-16

## 2023-11-16 NOTE — TELEPHONE ENCOUNTER
Connected with patient & her daughter this evening regarding the feet/ankle swelling. She had resumed her  pregabalin last Friday and was holding the lasix since her fluid in there lower feet/ankles had resolved. Her pain crisis subsided significantly and she was feeling very good. The daughter stated that the fluid returned yesterday and had some increase today. We will resume lasix daily dose and continue with pregabalin given she has not tolerated any prior medications NOR had improvement of her pain issues. Daughter and patient understand the process and accepting of the benefit/risk issue.

## 2023-11-20 ENCOUNTER — TELEPHONE (OUTPATIENT)
Age: 83
End: 2023-11-20

## 2023-11-20 ENCOUNTER — TELEPHONE (OUTPATIENT)
Dept: PULMONOLOGY | Age: 83
End: 2023-11-20

## 2023-11-20 DIAGNOSIS — G62.9 NEUROPATHY: ICD-10-CM

## 2023-11-20 DIAGNOSIS — Z51.5 PALLIATIVE CARE PATIENT: ICD-10-CM

## 2023-11-20 RX ORDER — PREGABALIN 100 MG/1
100 CAPSULE ORAL DAILY
Qty: 30 CAPSULE | Refills: 2 | Status: SHIPPED | OUTPATIENT
Start: 2023-11-20 | End: 2024-02-18

## 2023-11-21 NOTE — TELEPHONE ENCOUNTER
Checked in with the patient & her daughter. Swelling returned after taking BID dosing of Lyrica over the weekend. Currently HOLDING and taking the furosemide 20mg po daily. We will wait until swelling is gone and then try QD dosing of Lyrica - taking at the AM or PM - patient chose. Patient does not need discharge instruction sheets from last visit. We reviewed all information discussion and made sure her daughter heard the information. The patient/daughter can reach me directly for more urgent needs.

## 2023-12-04 ENCOUNTER — TELEPHONE (OUTPATIENT)
Age: 83
End: 2023-12-04

## 2023-12-04 DIAGNOSIS — J47.9 BRONCHIECTASIS WITHOUT COMPLICATION (HCC): Chronic | ICD-10-CM

## 2023-12-04 DIAGNOSIS — J96.11 CHRONIC RESPIRATORY FAILURE WITH HYPOXIA (HCC): Primary | Chronic | ICD-10-CM

## 2023-12-04 NOTE — TELEPHONE ENCOUNTER
Caregiver called for order to be sent to [facility] for nebulizer equipment - disposable & non-disposable kits. Order placed and fax number included.

## 2023-12-05 ENCOUNTER — HOSPITAL ENCOUNTER (OUTPATIENT)
Dept: NON INVASIVE DIAGNOSTICS | Age: 83
Discharge: HOME | End: 2023-12-07
Payer: MEDICARE

## 2023-12-05 DIAGNOSIS — I50.9 CONGESTIVE HEART FAILURE, UNSPECIFIED HF CHRONICITY, UNSPECIFIED HEART FAILURE TYPE (HCC): ICD-10-CM

## 2023-12-05 DIAGNOSIS — R06.00 DYSPNEA, UNSPECIFIED TYPE: ICD-10-CM

## 2023-12-05 DIAGNOSIS — Z51.5 PALLIATIVE CARE PATIENT: ICD-10-CM

## 2023-12-05 LAB
ECHO AO ASC DIAM: 3 CM
ECHO AO ROOT DIAM: 3.3 CM
ECHO AV AREA PEAK VELOCITY: 2 CM2
ECHO AV AREA VTI: 1.9 CM2
ECHO AV MEAN GRADIENT: 3 MMHG
ECHO AV MEAN VELOCITY: 0.9 M/S
ECHO AV PEAK GRADIENT: 6 MMHG
ECHO AV PEAK VELOCITY: 1.2 M/S
ECHO AV VELOCITY RATIO: 0.75
ECHO AV VTI: 26.8 CM
ECHO EST RA PRESSURE: 3 MMHG
ECHO IVC PROX: 1.4 CM
ECHO LA AREA 2C: 17.8 CM2
ECHO LA AREA 4C: 16.1 CM2
ECHO LA DIAMETER: 2.6 CM
ECHO LA MAJOR AXIS: 4.9 CM
ECHO LA MINOR AXIS: 4.8 CM
ECHO LA TO AORTIC ROOT RATIO: 0.79
ECHO LA VOL BP: 45 ML (ref 22–52)
ECHO LA VOL MOD A2C: 54 ML (ref 22–52)
ECHO LA VOL MOD A4C: 38 ML (ref 22–52)
ECHO LV E' LATERAL VELOCITY: 10 CM/S
ECHO LV E' SEPTAL VELOCITY: 9 CM/S
ECHO LV EDV A2C: 66 ML
ECHO LV EDV A4C: 70 ML
ECHO LV EJECTION FRACTION A2C: 59 %
ECHO LV EJECTION FRACTION A4C: 59 %
ECHO LV EJECTION FRACTION BIPLANE: 58 % (ref 55–100)
ECHO LV ESV A2C: 27 ML
ECHO LV ESV A4C: 29 ML
ECHO LV FRACTIONAL SHORTENING: 39 % (ref 28–44)
ECHO LV INTERNAL DIMENSION DIASTOLIC: 4.1 CM (ref 3.9–5.3)
ECHO LV INTERNAL DIMENSION SYSTOLIC: 2.5 CM
ECHO LV IVSD: 0.9 CM (ref 0.6–0.9)
ECHO LV MASS 2D: 114.1 G (ref 67–162)
ECHO LV POSTERIOR WALL DIASTOLIC: 0.9 CM (ref 0.6–0.9)
ECHO LV RELATIVE WALL THICKNESS RATIO: 0.44
ECHO LVOT AREA: 2.5 CM2
ECHO LVOT AV VTI INDEX: 0.73
ECHO LVOT DIAM: 1.8 CM
ECHO LVOT MEAN GRADIENT: 2 MMHG
ECHO LVOT PEAK GRADIENT: 4 MMHG
ECHO LVOT PEAK VELOCITY: 0.9 M/S
ECHO LVOT SV: 49.6 ML
ECHO LVOT VTI: 19.5 CM
ECHO MV A VELOCITY: 0.91 M/S
ECHO MV E DECELERATION TIME (DT): 232 MS
ECHO MV E VELOCITY: 0.89 M/S
ECHO MV E/A RATIO: 0.98
ECHO MV E/E' LATERAL: 8.9
ECHO MV E/E' RATIO (AVERAGED): 9.39
ECHO PV ACCELERATION TIME (AT): 121 MS
ECHO PV MAX VELOCITY: 0.8 M/S
ECHO PV PEAK GRADIENT: 3 MMHG
ECHO RIGHT VENTRICULAR SYSTOLIC PRESSURE (RVSP): 33 MMHG
ECHO RV BASAL DIMENSION: 3.1 CM
ECHO RV FREE WALL PEAK S': 15 CM/S
ECHO RV INTERNAL DIMENSION: 2.8 CM
ECHO RV TAPSE: 2.3 CM (ref 1.7–?)
ECHO TV REGURGITANT MAX VELOCITY: 2.75 M/S
ECHO TV REGURGITANT PEAK GRADIENT: 30 MMHG

## 2023-12-05 PROCEDURE — 93306 TTE W/DOPPLER COMPLETE: CPT

## 2023-12-05 PROCEDURE — 93306 TTE W/DOPPLER COMPLETE: CPT | Performed by: INTERNAL MEDICINE

## 2023-12-06 ENCOUNTER — TELEPHONE (OUTPATIENT)
Age: 83
End: 2023-12-06

## 2023-12-06 DIAGNOSIS — Z51.5 PALLIATIVE CARE PATIENT: ICD-10-CM

## 2023-12-06 DIAGNOSIS — G62.9 NEUROPATHY: ICD-10-CM

## 2023-12-06 DIAGNOSIS — F32.A DEPRESSION, UNSPECIFIED DEPRESSION TYPE: Primary | ICD-10-CM

## 2023-12-06 NOTE — TELEPHONE ENCOUNTER
Reviewed echo report with the patient. Plans to resume duloxetine 60mg ER BID since she was not able to tolerate the Lyrica.

## 2023-12-28 ENCOUNTER — TELEPHONE (OUTPATIENT)
Dept: PULMONOLOGY | Age: 83
End: 2023-12-28

## 2023-12-28 DIAGNOSIS — J44.1 COPD EXACERBATION (HCC): Primary | ICD-10-CM

## 2023-12-28 RX ORDER — PREDNISONE 20 MG/1
TABLET ORAL
Qty: 33 TABLET | Refills: 0 | Status: SHIPPED | OUTPATIENT
Start: 2023-12-28

## 2023-12-28 RX ORDER — DOXYCYCLINE HYCLATE 100 MG
100 TABLET ORAL 2 TIMES DAILY
Qty: 20 TABLET | Refills: 2 | Status: SHIPPED | OUTPATIENT
Start: 2023-12-28 | End: 2024-01-27

## 2023-12-28 NOTE — TELEPHONE ENCOUNTER
TRIAGE CALL      Complaint: congestion/dizzy  Cough: yes  Productive:  yes/dark   Bloody Sputum:  no   Increased SOB/Wheezing:  wheezing  Duration: couple days    Fever/Chills: no  OTC Meds tried: musinex    They are worried it's  getting into her chest.       [facility] in [address]

## 2023-12-28 NOTE — TELEPHONE ENCOUNTER
Talked with daughter regarding patient's symptoms - Started 2-3 days ago with cough and upper respiratory condition. No known COVID or flu exposures. Has history of similar issues - Requested ATB & prednisone RX. Routed Rxs to request [facility] on [facility]. Advised if worsening or not responding in 48 hours to seek urgent care, not ED.

## 2023-12-28 NOTE — TELEPHONE ENCOUNTER
LOV 11/14/23 via VV with REEMA Moseley--- palliative care, COPD, bronchiectasis, prior tobacco use, depression, anxiety, chronic pain syndrome, and debility     LOV 8/29/23 with Rene Robert NP--- COPD, emphysema, bronchiectasis, nocturnal hypoxia, former smoker    Pt's daughter calling to report that pt is having a cough and congestion. Some dark sputum production. No SOB but is wheezing. No fevers. This has been ongoing for a few days, daughter is worried that the congestion is getting into pt's chest and wants to avoid this. Taking Mucinex, albuterol, Trelegy, Singulair, and Daliresp.

## 2024-01-01 ENCOUNTER — HOSPITAL ENCOUNTER (EMERGENCY)
Age: 84
Discharge: HOME | End: 2024-12-30
Attending: GENERAL PRACTICE
Payer: MEDICARE

## 2024-01-01 ENCOUNTER — APPOINTMENT (OUTPATIENT)
Dept: CT IMAGING | Age: 84
End: 2024-01-01
Payer: MEDICARE

## 2024-01-01 VITALS
HEART RATE: 95 BPM | SYSTOLIC BLOOD PRESSURE: 183 MMHG | HEIGHT: 63 IN | TEMPERATURE: 97.7 F | OXYGEN SATURATION: 96 % | RESPIRATION RATE: 20 BRPM | BODY MASS INDEX: 22.32 KG/M2 | WEIGHT: 126 LBS | DIASTOLIC BLOOD PRESSURE: 98 MMHG

## 2024-01-01 DIAGNOSIS — I48.20 CHRONIC ATRIAL FIBRILLATION (HCC): Primary | ICD-10-CM

## 2024-01-01 LAB
ALBUMIN SERPL-MCNC: 3.2 G/DL (ref 3.2–4.6)
ALBUMIN/GLOB SERPL: 0.8 (ref 1–1.9)
ALP SERPL-CCNC: 83 U/L (ref 35–104)
ALT SERPL-CCNC: 12 U/L (ref 8–45)
ANION GAP SERPL CALC-SCNC: 13 MMOL/L (ref 7–16)
AST SERPL-CCNC: 18 U/L (ref 15–37)
BASOPHILS # BLD: 0 K/UL (ref 0–0.2)
BASOPHILS NFR BLD: 0 % (ref 0–2)
BILIRUB SERPL-MCNC: 0.3 MG/DL (ref 0–1.2)
BILIRUB UR QL: NEGATIVE
BUN SERPL-MCNC: 20 MG/DL (ref 8–23)
CALCIUM SERPL-MCNC: 9.8 MG/DL (ref 8.8–10.2)
CHLORIDE SERPL-SCNC: 100 MMOL/L (ref 98–107)
CO2 SERPL-SCNC: 22 MMOL/L (ref 20–29)
CREAT SERPL-MCNC: 0.87 MG/DL (ref 0.6–1.1)
DIFFERENTIAL METHOD BLD: ABNORMAL
EKG DIAGNOSIS: NORMAL
EKG Q-T INTERVAL: 316 MS
EKG QRS DURATION: 74 MS
EKG QTC CALCULATION (BAZETT): 409 MS
EKG R AXIS: 20 DEGREES
EKG T AXIS: 45 DEGREES
EKG VENTRICULAR RATE: 101 BPM
EOSINOPHIL # BLD: 0 K/UL (ref 0–0.8)
EOSINOPHIL NFR BLD: 0 % (ref 0.5–7.8)
ERYTHROCYTE [DISTWIDTH] IN BLOOD BY AUTOMATED COUNT: 15.8 % (ref 11.9–14.6)
GLOBULIN SER CALC-MCNC: 3.8 G/DL (ref 2.3–3.5)
GLUCOSE SERPL-MCNC: 154 MG/DL (ref 70–99)
GLUCOSE UR QL STRIP.AUTO: NEGATIVE MG/DL
HCT VFR BLD AUTO: 38.4 % (ref 35.8–46.3)
HGB BLD-MCNC: 12.2 G/DL (ref 11.7–15.4)
IMM GRANULOCYTES # BLD AUTO: 0.1 K/UL (ref 0–0.5)
IMM GRANULOCYTES NFR BLD AUTO: 1 % (ref 0–5)
KETONES UR-MCNC: NEGATIVE MG/DL
LACTATE SERPL-SCNC: 1.3 MMOL/L (ref 0.5–2)
LEUKOCYTE ESTERASE UR QL STRIP: NEGATIVE
LIPASE SERPL-CCNC: 35 U/L (ref 13–60)
LYMPHOCYTES # BLD: 1.9 K/UL (ref 0.5–4.6)
LYMPHOCYTES NFR BLD: 19 % (ref 13–44)
MCH RBC QN AUTO: 28.6 PG (ref 26.1–32.9)
MCHC RBC AUTO-ENTMCNC: 31.8 G/DL (ref 31.4–35)
MCV RBC AUTO: 89.9 FL (ref 82–102)
MONOCYTES # BLD: 0.7 K/UL (ref 0.1–1.3)
MONOCYTES NFR BLD: 7 % (ref 4–12)
NEUTS SEG # BLD: 7.1 K/UL (ref 1.7–8.2)
NEUTS SEG NFR BLD: 73 % (ref 43–78)
NITRITE UR QL: NEGATIVE
NRBC # BLD: 0 K/UL (ref 0–0.2)
PH UR: 6.5 (ref 5–9)
PLATELET # BLD AUTO: 430 K/UL (ref 150–450)
PMV BLD AUTO: 9.8 FL (ref 9.4–12.3)
POTASSIUM SERPL-SCNC: 3.8 MMOL/L (ref 3.5–5.1)
PROCALCITONIN SERPL-MCNC: 0.09 NG/ML (ref 0–0.1)
PROT SERPL-MCNC: 6.9 G/DL (ref 6.3–8.2)
PROT UR QL: ABNORMAL MG/DL
RBC # BLD AUTO: 4.27 M/UL (ref 4.05–5.2)
RBC # UR STRIP: NEGATIVE
SERVICE CMNT-IMP: ABNORMAL
SODIUM SERPL-SCNC: 136 MMOL/L (ref 136–145)
SP GR UR: 1.02 (ref 1–1.02)
UROBILINOGEN UR QL: 0.2 EU/DL (ref 0.2–1)
WBC # BLD AUTO: 9.8 K/UL (ref 4.3–11.1)

## 2024-01-01 PROCEDURE — 81003 URINALYSIS AUTO W/O SCOPE: CPT

## 2024-01-01 PROCEDURE — 96375 TX/PRO/DX INJ NEW DRUG ADDON: CPT

## 2024-01-01 PROCEDURE — 93010 ELECTROCARDIOGRAM REPORT: CPT | Performed by: INTERNAL MEDICINE

## 2024-01-01 PROCEDURE — 84145 PROCALCITONIN (PCT): CPT

## 2024-01-01 PROCEDURE — 83605 ASSAY OF LACTIC ACID: CPT

## 2024-01-01 PROCEDURE — 6370000000 HC RX 637 (ALT 250 FOR IP)

## 2024-01-01 PROCEDURE — 2500000003 HC RX 250 WO HCPCS

## 2024-01-01 PROCEDURE — 6360000002 HC RX W HCPCS

## 2024-01-01 PROCEDURE — 6360000004 HC RX CONTRAST MEDICATION

## 2024-01-01 PROCEDURE — 93005 ELECTROCARDIOGRAM TRACING: CPT

## 2024-01-01 PROCEDURE — 74177 CT ABD & PELVIS W/CONTRAST: CPT

## 2024-01-01 PROCEDURE — 85025 COMPLETE CBC W/AUTO DIFF WBC: CPT

## 2024-01-01 PROCEDURE — 96374 THER/PROPH/DIAG INJ IV PUSH: CPT

## 2024-01-01 PROCEDURE — 99285 EMERGENCY DEPT VISIT HI MDM: CPT

## 2024-01-01 PROCEDURE — 83690 ASSAY OF LIPASE: CPT

## 2024-01-01 PROCEDURE — 80053 COMPREHEN METABOLIC PANEL: CPT

## 2024-01-01 PROCEDURE — 87040 BLOOD CULTURE FOR BACTERIA: CPT

## 2024-01-01 RX ORDER — DILTIAZEM HYDROCHLORIDE 5 MG/ML
10 INJECTION INTRAVENOUS ONCE
Status: COMPLETED | OUTPATIENT
Start: 2024-01-01 | End: 2024-01-01

## 2024-01-01 RX ORDER — IOPAMIDOL 755 MG/ML
100 INJECTION, SOLUTION INTRAVASCULAR
Status: COMPLETED | OUTPATIENT
Start: 2024-01-01 | End: 2024-01-01

## 2024-01-01 RX ORDER — METOPROLOL SUCCINATE 50 MG/1
75 TABLET, EXTENDED RELEASE ORAL DAILY
Qty: 45 TABLET | Refills: 0 | Status: SHIPPED | OUTPATIENT
Start: 2024-01-01

## 2024-01-01 RX ORDER — MORPHINE SULFATE 4 MG/ML
2 INJECTION, SOLUTION INTRAMUSCULAR; INTRAVENOUS
Status: COMPLETED | OUTPATIENT
Start: 2024-01-01 | End: 2024-01-01

## 2024-01-01 RX ORDER — METOPROLOL SUCCINATE 25 MG/1
25 TABLET, EXTENDED RELEASE ORAL
Status: COMPLETED | OUTPATIENT
Start: 2024-01-01 | End: 2024-01-01

## 2024-01-01 RX ADMIN — IOPAMIDOL 100 ML: 755 INJECTION, SOLUTION INTRAVENOUS at 17:46

## 2024-01-01 RX ADMIN — DILTIAZEM HYDROCHLORIDE 10 MG: 5 INJECTION, SOLUTION INTRAVENOUS at 18:46

## 2024-01-01 RX ADMIN — METOPROLOL SUCCINATE 25 MG: 25 TABLET, FILM COATED, EXTENDED RELEASE ORAL at 20:50

## 2024-01-01 RX ADMIN — MORPHINE SULFATE 2 MG: 4 INJECTION INTRAVENOUS at 16:55

## 2024-01-01 ASSESSMENT — PAIN DESCRIPTION - ORIENTATION
ORIENTATION: LEFT;LOWER
ORIENTATION: LEFT
ORIENTATION: LEFT

## 2024-01-01 ASSESSMENT — ENCOUNTER SYMPTOMS
VOMITING: 0
SHORTNESS OF BREATH: 0
ABDOMINAL PAIN: 1
NAUSEA: 0
DIARRHEA: 0
CHEST TIGHTNESS: 0

## 2024-01-01 ASSESSMENT — PAIN SCALES - GENERAL
PAINLEVEL_OUTOF10: 8
PAINLEVEL_OUTOF10: 2
PAINLEVEL_OUTOF10: 10
PAINLEVEL_OUTOF10: 0

## 2024-01-01 ASSESSMENT — PAIN DESCRIPTION - LOCATION
LOCATION: ABDOMEN
LOCATION: ABDOMEN
LOCATION: ABDOMEN;FLANK

## 2024-01-01 ASSESSMENT — PAIN DESCRIPTION - DESCRIPTORS
DESCRIPTORS: ACHING
DESCRIPTORS: ACHING
DESCRIPTORS: PATIENT UNABLE TO DESCRIBE

## 2024-01-01 ASSESSMENT — PAIN DESCRIPTION - DIRECTION: RADIATING_TOWARDS: BACK

## 2024-01-01 ASSESSMENT — PAIN - FUNCTIONAL ASSESSMENT
PAIN_FUNCTIONAL_ASSESSMENT: 0-10
PAIN_FUNCTIONAL_ASSESSMENT: 0-10

## 2024-01-01 ASSESSMENT — PAIN DESCRIPTION - PAIN TYPE: TYPE: ACUTE PAIN

## 2024-01-03 ENCOUNTER — HOSPITAL ENCOUNTER (EMERGENCY)
Age: 84
Discharge: HOME | End: 2024-01-03
Attending: EMERGENCY MEDICINE
Payer: MEDICARE

## 2024-01-03 ENCOUNTER — APPOINTMENT (OUTPATIENT)
Dept: GENERAL RADIOLOGY | Age: 84
End: 2024-01-03
Payer: MEDICARE

## 2024-01-03 VITALS
OXYGEN SATURATION: 99 % | HEART RATE: 96 BPM | HEIGHT: 63 IN | BODY MASS INDEX: 21.26 KG/M2 | DIASTOLIC BLOOD PRESSURE: 88 MMHG | WEIGHT: 120 LBS | RESPIRATION RATE: 21 BRPM | SYSTOLIC BLOOD PRESSURE: 161 MMHG | TEMPERATURE: 98.6 F

## 2024-01-03 DIAGNOSIS — E87.1 HYPONATREMIA: ICD-10-CM

## 2024-01-03 DIAGNOSIS — I48.91 NEW ONSET A-FIB (HCC): Primary | ICD-10-CM

## 2024-01-03 LAB
ANION GAP SERPL CALC-SCNC: 5 MMOL/L (ref 2–11)
BASOPHILS # BLD: 0 K/UL (ref 0–0.2)
BASOPHILS NFR BLD: 0 % (ref 0–2)
BILIRUB UR QL: NEGATIVE
BUN SERPL-MCNC: 29 MG/DL (ref 8–23)
CALCIUM SERPL-MCNC: 9.9 MG/DL (ref 8.3–10.4)
CHLORIDE SERPL-SCNC: 101 MMOL/L (ref 103–113)
CHP ED QC CHECK: YES
CO2 SERPL-SCNC: 24 MMOL/L (ref 21–32)
CREAT SERPL-MCNC: 0.9 MG/DL (ref 0.6–1)
DIFFERENTIAL METHOD BLD: ABNORMAL
EOSINOPHIL # BLD: 0 K/UL (ref 0–0.8)
EOSINOPHIL NFR BLD: 0 % (ref 0.5–7.8)
ERYTHROCYTE [DISTWIDTH] IN BLOOD BY AUTOMATED COUNT: 16.9 % (ref 11.9–14.6)
GLUCOSE SERPL-MCNC: 99 MG/DL (ref 65–100)
GLUCOSE UR QL STRIP.AUTO: NEGATIVE MG/DL
HCT VFR BLD AUTO: 37.3 % (ref 35.8–46.3)
HGB BLD-MCNC: 12 G/DL (ref 11.7–15.4)
IMM GRANULOCYTES # BLD AUTO: 0.2 K/UL (ref 0–0.5)
IMM GRANULOCYTES NFR BLD AUTO: 2 % (ref 0–5)
KETONES UR-MCNC: NEGATIVE MG/DL
LEUKOCYTE ESTERASE UR QL STRIP: NEGATIVE
LYMPHOCYTES # BLD: 1.6 K/UL (ref 0.5–4.6)
LYMPHOCYTES NFR BLD: 13 % (ref 13–44)
MAGNESIUM SERPL-MCNC: 2.5 MG/DL (ref 1.8–2.4)
MCH RBC QN AUTO: 27.8 PG (ref 26.1–32.9)
MCHC RBC AUTO-ENTMCNC: 32.2 G/DL (ref 31.4–35)
MCV RBC AUTO: 86.5 FL (ref 82–102)
MONOCYTES # BLD: 0.7 K/UL (ref 0.1–1.3)
MONOCYTES NFR BLD: 6 % (ref 4–12)
NEUTS SEG # BLD: 9.5 K/UL (ref 1.7–8.2)
NEUTS SEG NFR BLD: 79 % (ref 43–78)
NITRITE UR QL: NEGATIVE
NRBC # BLD: 0.04 K/UL (ref 0–0.2)
PH UR: 6.5 (ref 5–9)
PLATELET # BLD AUTO: 471 K/UL (ref 150–450)
PMV BLD AUTO: 9.6 FL (ref 9.4–12.3)
POTASSIUM SERPL-SCNC: 4.2 MMOL/L (ref 3.5–5.1)
PROT UR QL: NEGATIVE MG/DL
RBC # BLD AUTO: 4.31 M/UL (ref 4.05–5.2)
RBC # UR STRIP: ABNORMAL
SERVICE CMNT-IMP: ABNORMAL
SODIUM SERPL-SCNC: 130 MMOL/L (ref 136–146)
SP GR UR: 1.02 (ref 1–1.02)
TROPONIN I SERPL HS-MCNC: 14.4 PG/ML (ref 0–14)
UROBILINOGEN UR QL: 0.2 EU/DL (ref 0.2–1)
WBC # BLD AUTO: 12.1 K/UL (ref 4.3–11.1)

## 2024-01-03 PROCEDURE — 85025 COMPLETE CBC W/AUTO DIFF WBC: CPT

## 2024-01-03 PROCEDURE — 2500000003 HC RX 250 WO HCPCS: Performed by: EMERGENCY MEDICINE

## 2024-01-03 PROCEDURE — 84484 ASSAY OF TROPONIN QUANT: CPT

## 2024-01-03 PROCEDURE — 96374 THER/PROPH/DIAG INJ IV PUSH: CPT

## 2024-01-03 PROCEDURE — 6370000000 HC RX 637 (ALT 250 FOR IP): Performed by: EMERGENCY MEDICINE

## 2024-01-03 PROCEDURE — 81003 URINALYSIS AUTO W/O SCOPE: CPT

## 2024-01-03 PROCEDURE — 80048 BASIC METABOLIC PNL TOTAL CA: CPT

## 2024-01-03 PROCEDURE — 99285 EMERGENCY DEPT VISIT HI MDM: CPT

## 2024-01-03 PROCEDURE — 71046 X-RAY EXAM CHEST 2 VIEWS: CPT

## 2024-01-03 PROCEDURE — 83735 ASSAY OF MAGNESIUM: CPT

## 2024-01-03 PROCEDURE — 2580000003 HC RX 258: Performed by: EMERGENCY MEDICINE

## 2024-01-03 RX ORDER — DILTIAZEM HYDROCHLORIDE 120 MG/1
120 CAPSULE, COATED, EXTENDED RELEASE ORAL DAILY
Qty: 30 CAPSULE | Refills: 0 | Status: SHIPPED | OUTPATIENT
Start: 2024-01-03

## 2024-01-03 RX ORDER — 0.9 % SODIUM CHLORIDE 0.9 %
1000 INTRAVENOUS SOLUTION INTRAVENOUS ONCE
Status: COMPLETED | OUTPATIENT
Start: 2024-01-03 | End: 2024-01-03

## 2024-01-03 RX ORDER — DILTIAZEM HYDROCHLORIDE 5 MG/ML
10 INJECTION INTRAVENOUS
Status: COMPLETED | OUTPATIENT
Start: 2024-01-03 | End: 2024-01-03

## 2024-01-03 RX ORDER — ASPIRIN 81 MG/1
324 TABLET, CHEWABLE ORAL ONCE
Status: COMPLETED | OUTPATIENT
Start: 2024-01-03 | End: 2024-01-03

## 2024-01-03 RX ADMIN — ASPIRIN 324 MG: 81 TABLET, CHEWABLE ORAL at 15:06

## 2024-01-03 RX ADMIN — SODIUM CHLORIDE 1000 ML: 9 INJECTION, SOLUTION INTRAVENOUS at 17:18

## 2024-01-03 RX ADMIN — DILTIAZEM HYDROCHLORIDE 10 MG: 5 INJECTION, SOLUTION INTRAVENOUS at 15:07

## 2024-01-03 ASSESSMENT — PAIN - FUNCTIONAL ASSESSMENT: PAIN_FUNCTIONAL_ASSESSMENT: NONE - DENIES PAIN

## 2024-01-03 NOTE — ED PROVIDER NOTES
Emergency Department Provider Signout / Continuation of Care Note         DISPOSITION         ICD-10-CM    1. New onset a-fib (HCC)  I48.91       2. Hyponatremia  E87.1           The patient's care was signed out to me at shift change.      Final Plan      Awaiting all labs and monitoring of heart rate after Cardizem.    I spoke to the patient and her family.  She has no chest pain.  She has had no nausea vomiting diarrhea.  She has no history of GI bleeding, head bleed or recent surgery.  Her sodium is mildly low and will give a bag of fluids.  This will need to be rechecked as an outpatient.  A note to [facility] cardiology was placed to see patient as an outpatient.  Her CHADS2 score is 3 so she will be started on Eliquis.  Eliquis dosing will be at 2.5 mg p.o. twice daily given patient is less than 60 kg and greater than 80 years old.  Will hold her Norvasc and start Cardizem .  This was all explained to the patient and her family and opportunity for questions given.     Shilpi Brantley MD  01/03/24 1737       Shilpi Brantley MD  01/03/24 1749

## 2024-01-03 NOTE — DISCHARGE SUMMARY
I have reviewed discharge instructions with the patient.  The patient verbalized understanding.    Patient left ED via Discharge Method: wheelchair to Home with daughter.    Opportunity for questions and clarification provided.       Patient given 2 scripts.         To continue your aftercare when you leave the hospital, you may receive an automated call from our care team to check in on how you are doing.  This is a free service and part of our promise to provide the best care and service to meet your aftercare needs.” If you have questions, or wish to unsubscribe from this service please call [phone].  Thank you for Choosing our [facility] Emergency Department.

## 2024-01-03 NOTE — DISCHARGE INSTRUCTIONS
Stop your amlodipine.  Start the Cardizem and Eliquis tonight.  Take your blood pressure daily if possible to bring with you to the cardiologist office.  The office should call you but if you do not hear from them by Friday please call the office and tell them you are seen in the emergency department and were told to have an appointment within the next week.  Return to the ER for increased chest pain, shortness of breath, blood in your stools or black tarry stools.  Your sodium was slightly low at 130 today.  You need to have this rechecked with either the cardiologist or your PCP.

## 2024-01-03 NOTE — ED TRIAGE NOTES
Pt arrives to ER via [facility] from [facility]. Reports feeling \"unwell\" for the past two days. [facility], observed some AMS, concerned for UTI. Noticed irregular heart rate - a-fib on monitor (no history a-fib). 02 sats stable on 4L NC. Patient wears 2L NC at night when sleeping.    /92    RR 16    18g left AC

## 2024-01-03 NOTE — ED PROVIDER NOTES
Emergency Department Provider Note       PCP: Bridgett Moseley, APRN - CNP   Age: 83 y.o.   Sex: female     DISPOSITION         ICD-10-CM    1. Atrial fibrillation, unspecified type (HCC)  I48.91           Medical Decision Making     Complexity of Problems Addressed:  1 or more acute illnesses that pose a threat to life or bodily function.     Data Reviewed and Analyzed:   I independently ordered and reviewed each unique test.         I independently ordered and interpreted the ED EKG in the absence of a Cardiologist.    Rate: 104  EKG Interpretation: EKG Interpretation: atrial fibrillation  ST Segments: Normal ST segments - NO STEMI        Discussion of management or test interpretation.  DDX:     CHF, COPD, pneumonia, PE,    MI, coronary artery disease, unstable angina, coronary artery disease,    Atrial fibrillation, cardiac arrhythmia, PVC, medication induced palpitations, heart block,  electrolyte induced palpitations,    Aortic dissection, aortic aneurysm,    GERD, musculoskeletal pain, costochondritis, rib fracture, pleurisy,        Risk of Complications and/or Morbidity of Patient Management:      ED Course as of 01/03/24 1458   Wed Jan 03, 2024   1457 2:57 PM EST The patient's care will be transitioned to Dr. Brantley.  At this time, the plan is follow-up with pending lab work and plan disposition based off of workup.  Please see that provider's documentation for further information.     [KH]      ED Course User Index  [KH] Wyatt Maciel, DO          History      Patient is an 83-year-old female presenting to the emergency department today complaining of palpitations intermittently over the last several months.  Patient has been sick recently and been on 2 rounds of antibiotics and steroids.  The patient went to follow-up appoint with her family doctor today and they did an EKG and was found to have atrial fibrillation.  Patient does not have any known history of A-fib and is not on any blood thinners.

## 2024-01-04 ENCOUNTER — TELEPHONE (OUTPATIENT)
Age: 84
End: 2024-01-04

## 2024-01-04 NOTE — TELEPHONE ENCOUNTER
----- Message from Shilpi Brantley MD sent at 1/3/2024  5:45 PM EST -----  Regarding: ED follow up  New onset afib.  Rate controlled.  Started on cardizem CD and eliquis.  Needs appt within 1 week with next available provider.  Her Na was 130 in ED and needs recheck as ouptpt so if this could be done prior to her appt that would be great.

## 2024-01-08 ASSESSMENT — ENCOUNTER SYMPTOMS
SHORTNESS OF BREATH: 0
CONSTIPATION: 0
PHOTOPHOBIA: 0
DIARRHEA: 0
NAUSEA: 0
ABDOMINAL PAIN: 0
COUGH: 0
WHEEZING: 0
ABDOMINAL DISTENTION: 0
VOMITING: 0

## 2024-01-09 ENCOUNTER — INITIAL CONSULT (OUTPATIENT)
Age: 84
End: 2024-01-09
Payer: MEDICARE

## 2024-01-09 ENCOUNTER — TELEMEDICINE (OUTPATIENT)
Age: 84
End: 2024-01-09
Payer: MEDICARE

## 2024-01-09 VITALS
HEART RATE: 79 BPM | DIASTOLIC BLOOD PRESSURE: 60 MMHG | SYSTOLIC BLOOD PRESSURE: 130 MMHG | BODY MASS INDEX: 22.86 KG/M2 | HEIGHT: 63 IN | WEIGHT: 129 LBS

## 2024-01-09 DIAGNOSIS — I27.20 PULMONARY HYPERTENSION (HCC): ICD-10-CM

## 2024-01-09 DIAGNOSIS — J44.9 MODERATE COPD (CHRONIC OBSTRUCTIVE PULMONARY DISEASE) (HCC): Chronic | ICD-10-CM

## 2024-01-09 DIAGNOSIS — E78.2 MIXED HYPERLIPIDEMIA: ICD-10-CM

## 2024-01-09 DIAGNOSIS — G47.33 OSA ON CPAP: ICD-10-CM

## 2024-01-09 DIAGNOSIS — J47.9 BRONCHIECTASIS WITHOUT COMPLICATION (HCC): ICD-10-CM

## 2024-01-09 DIAGNOSIS — I25.10 CORONARY ARTERY DISEASE INVOLVING NATIVE CORONARY ARTERY OF NATIVE HEART WITHOUT ANGINA PECTORIS: Primary | ICD-10-CM

## 2024-01-09 DIAGNOSIS — G62.9 NEUROPATHY: ICD-10-CM

## 2024-01-09 DIAGNOSIS — I10 PRIMARY HYPERTENSION: ICD-10-CM

## 2024-01-09 DIAGNOSIS — E87.1 HYPONATREMIA: ICD-10-CM

## 2024-01-09 DIAGNOSIS — J96.11 CHRONIC RESPIRATORY FAILURE WITH HYPOXIA (HCC): Primary | ICD-10-CM

## 2024-01-09 DIAGNOSIS — I48.0 PAROXYSMAL ATRIAL FIBRILLATION (HCC): ICD-10-CM

## 2024-01-09 DIAGNOSIS — Z51.5 PALLIATIVE CARE PATIENT: ICD-10-CM

## 2024-01-09 LAB
ANION GAP SERPL CALC-SCNC: 7 MMOL/L (ref 2–11)
BUN SERPL-MCNC: 35 MG/DL (ref 8–23)
CALCIUM SERPL-MCNC: 9.2 MG/DL (ref 8.3–10.4)
CHLORIDE SERPL-SCNC: 102 MMOL/L (ref 103–113)
CO2 SERPL-SCNC: 24 MMOL/L (ref 21–32)
CREAT SERPL-MCNC: 0.9 MG/DL (ref 0.6–1)
GLUCOSE SERPL-MCNC: 104 MG/DL (ref 65–100)
POTASSIUM SERPL-SCNC: 4.9 MMOL/L (ref 3.5–5.1)
SODIUM SERPL-SCNC: 133 MMOL/L (ref 136–146)

## 2024-01-09 PROCEDURE — G8484 FLU IMMUNIZE NO ADMIN: HCPCS | Performed by: INTERNAL MEDICINE

## 2024-01-09 PROCEDURE — 99215 OFFICE O/P EST HI 40 MIN: CPT | Performed by: NURSE PRACTITIONER

## 2024-01-09 PROCEDURE — 1090F PRES/ABSN URINE INCON ASSESS: CPT | Performed by: NURSE PRACTITIONER

## 2024-01-09 PROCEDURE — 1036F TOBACCO NON-USER: CPT | Performed by: NURSE PRACTITIONER

## 2024-01-09 PROCEDURE — G8399 PT W/DXA RESULTS DOCUMENT: HCPCS | Performed by: INTERNAL MEDICINE

## 2024-01-09 PROCEDURE — G8420 CALC BMI NORM PARAMETERS: HCPCS | Performed by: NURSE PRACTITIONER

## 2024-01-09 PROCEDURE — 3075F SYST BP GE 130 - 139MM HG: CPT | Performed by: INTERNAL MEDICINE

## 2024-01-09 PROCEDURE — 3023F SPIROM DOC REV: CPT | Performed by: INTERNAL MEDICINE

## 2024-01-09 PROCEDURE — G8399 PT W/DXA RESULTS DOCUMENT: HCPCS | Performed by: NURSE PRACTITIONER

## 2024-01-09 PROCEDURE — G8484 FLU IMMUNIZE NO ADMIN: HCPCS | Performed by: NURSE PRACTITIONER

## 2024-01-09 PROCEDURE — 1123F ACP DISCUSS/DSCN MKR DOCD: CPT | Performed by: INTERNAL MEDICINE

## 2024-01-09 PROCEDURE — 1090F PRES/ABSN URINE INCON ASSESS: CPT | Performed by: INTERNAL MEDICINE

## 2024-01-09 PROCEDURE — G8427 DOCREV CUR MEDS BY ELIG CLIN: HCPCS | Performed by: INTERNAL MEDICINE

## 2024-01-09 PROCEDURE — 3078F DIAST BP <80 MM HG: CPT | Performed by: INTERNAL MEDICINE

## 2024-01-09 PROCEDURE — 93000 ELECTROCARDIOGRAM COMPLETE: CPT | Performed by: INTERNAL MEDICINE

## 2024-01-09 PROCEDURE — G8420 CALC BMI NORM PARAMETERS: HCPCS | Performed by: INTERNAL MEDICINE

## 2024-01-09 PROCEDURE — 99204 OFFICE O/P NEW MOD 45 MIN: CPT | Performed by: INTERNAL MEDICINE

## 2024-01-09 PROCEDURE — 1036F TOBACCO NON-USER: CPT | Performed by: INTERNAL MEDICINE

## 2024-01-09 PROCEDURE — 1123F ACP DISCUSS/DSCN MKR DOCD: CPT | Performed by: NURSE PRACTITIONER

## 2024-01-09 PROCEDURE — G8427 DOCREV CUR MEDS BY ELIG CLIN: HCPCS | Performed by: NURSE PRACTITIONER

## 2024-01-09 RX ORDER — DILTIAZEM HYDROCHLORIDE 120 MG/1
120 CAPSULE, COATED, EXTENDED RELEASE ORAL DAILY
Qty: 90 CAPSULE | Refills: 3 | Status: SHIPPED | OUTPATIENT
Start: 2024-01-09

## 2024-01-09 RX ORDER — DULOXETIN HYDROCHLORIDE 60 MG/1
60 CAPSULE, DELAYED RELEASE ORAL DAILY
COMMUNITY

## 2024-01-09 RX ORDER — PREGABALIN 100 MG/1
100 CAPSULE ORAL DAILY
Qty: 90 CAPSULE | Refills: 3 | Status: SHIPPED | OUTPATIENT
Start: 2024-01-09 | End: 2025-01-03

## 2024-01-09 ASSESSMENT — ENCOUNTER SYMPTOMS: CHEST TIGHTNESS: 1

## 2024-01-09 NOTE — PROGRESS NOTES
[facility] CARDIOLOGY  [address] [place] [address], [address]  [place] [address]  PHONE: [phone]        NAME:  Dae Correa  : 1940  MRN: [id number]     PCP:  Bridgett Moseley APRN - CNP    SUBJECTIVE:   Dae Correa is a 83 y.o. female seen for a consultation visit regarding the following:     Chief Complaint   Patient presents with    Atrial Fibrillation        HPI:  Consultation is requested by [facility] physicians for evaluation of Atrial Fibrillation     She has a history of COPD and numerous other noncardiac comorbidities but reported that her chest felt different with tightness but no palpitations or worsening shortness of breath.  Her family took her to the ER where she was noted to be in rapid A-fib.  She was administered intravenous Cardizem and spontaneously converted back to sinus rhythm.  Her amlodipine was converted to Cardizem CD and she was started on low-dose Eliquis given her age and weight.  She presents today in follow-up.  She denies any interval angina, syncope, CHF, or palpitations.  Vital signs are stable and ECG is nonischemic.  She is in sinus rhythm.  She uses a walker at home.  She denies any interval bleeding or fall history.    Echocardiogram 2023:  Left Ventricle Normal left ventricular systolic function with a visually estimated EF of 55 - 60%. Left ventricle size is normal. Normal wall thickness. Normal wall motion. Normal diastolic function.   Left Atrium Left atrium size is normal.   Right Ventricle Right ventricle size is normal. Normal systolic function.   Right Atrium Right atrium size is normal.   Aortic Valve Mild sclerosis of the aortic valve cusp. Trace regurgitation. No stenosis.   Mitral Valve Mild annular calcification of the mitral valve. Mild regurgitation. No stenosis noted.   Tricuspid Valve Valve structure is normal. Mild regurgitation. No stenosis noted. The estimated RVSP is 33 mmHg.   Pulmonic Valve The pulmonic valve was

## 2024-01-11 NOTE — PROGRESS NOTES
Contacted the patient's daughter regarding the results of the BNP - Requested fluids/Gatorade hydration over the next several days/week. No acute issues at this point.

## 2024-01-18 RX ORDER — FLUTICASONE FUROATE, UMECLIDINIUM BROMIDE AND VILANTEROL TRIFENATATE 100; 62.5; 25 UG/1; UG/1; UG/1
POWDER RESPIRATORY (INHALATION)
Refills: 3 | OUTPATIENT
Start: 2024-01-18

## 2024-01-25 ENCOUNTER — TELEPHONE (OUTPATIENT)
Dept: PULMONOLOGY | Age: 84
End: 2024-01-25

## 2024-02-05 ENCOUNTER — TELEPHONE (OUTPATIENT)
Dept: ORTHOPEDIC SURGERY | Age: 84
End: 2024-02-05

## 2024-02-05 DIAGNOSIS — M54.2 CERVICAL SPINE PAIN: Primary | ICD-10-CM

## 2024-02-05 NOTE — TELEPHONE ENCOUNTER
Her daughter is asking for a call to get her mom scheduled for more injections in her neck like she had in Oct. She does want him to know that she is now on Eliquis if that makes a difference. Please call Suzette at [phone].

## 2024-02-06 ENCOUNTER — TELEPHONE (OUTPATIENT)
Age: 84
End: 2024-02-06

## 2024-02-06 ENCOUNTER — PATIENT MESSAGE (OUTPATIENT)
Age: 84
End: 2024-02-06

## 2024-02-06 NOTE — TELEPHONE ENCOUNTER
----- Message from Dae Correa sent at 2/6/2024 11:17 AM EST -----  Regarding: Med. question  Contact: [phone]  My mother saw Dr Harris on 10/26 for neck pain and he administered shots into 4 muscle sites. The medicine used was 40 mg of Depo Medrol  with 1x of 0.25% Marcaine. Her pain has returned and she needs another round of shots but wanted to check with Dr Randolph first to make sure this medication will not interfere with medicine prescribed by Dr Randolph for her AFib.   Thank you,  Suzette Anderson

## 2024-02-07 NOTE — TELEPHONE ENCOUNTER
It can increase resting HR but if she needs the inhaler/neb due to SOB or wheezing, then she needs to use it.  If HR starts racing she needs to give us a call.

## 2024-02-08 ENCOUNTER — OFFICE VISIT (OUTPATIENT)
Dept: ORTHOPEDIC SURGERY | Age: 84
End: 2024-02-08

## 2024-02-08 VITALS — WEIGHT: 129 LBS | HEIGHT: 63 IN | BODY MASS INDEX: 22.86 KG/M2

## 2024-02-08 DIAGNOSIS — M60.9 MYOSITIS, UNSPECIFIED MYOSITIS TYPE, UNSPECIFIED SITE: ICD-10-CM

## 2024-02-08 DIAGNOSIS — M54.2 CERVICAL SPINE PAIN: Primary | ICD-10-CM

## 2024-02-08 RX ORDER — LEVOTHYROXINE SODIUM 88 UG/1
TABLET ORAL
COMMUNITY
Start: 2024-01-11

## 2024-02-08 RX ORDER — OXYBUTYNIN CHLORIDE 5 MG/1
TABLET ORAL
COMMUNITY

## 2024-02-08 RX ORDER — VALACYCLOVIR HYDROCHLORIDE 1 G/1
1000 TABLET, FILM COATED ORAL DAILY
COMMUNITY

## 2024-02-08 RX ORDER — TRIAMCINOLONE ACETONIDE 40 MG/ML
160 INJECTION, SUSPENSION INTRA-ARTICULAR; INTRAMUSCULAR ONCE
Status: COMPLETED | OUTPATIENT
Start: 2024-02-08 | End: 2024-02-08

## 2024-02-08 RX ORDER — OMEPRAZOLE 40 MG/1
CAPSULE, DELAYED RELEASE ORAL
COMMUNITY
Start: 2023-12-04

## 2024-02-08 RX ORDER — MECLIZINE HYDROCHLORIDE 25 MG/1
1 TABLET ORAL DAILY PRN
COMMUNITY

## 2024-02-08 RX ADMIN — TRIAMCINOLONE ACETONIDE 160 MG: 40 INJECTION, SUSPENSION INTRA-ARTICULAR; INTRAMUSCULAR at 14:53

## 2024-02-08 NOTE — PROGRESS NOTES
Date: 02/08/24   Name: Dae Correa    Pre-Procedural Diagnosis:    Diagnosis Orders   1. Cervical spine pain  triamcinolone acetonide (KENALOG-40) injection 160 mg    INJECT TRIGGER POINT, 1 OR 2      2. Myositis, unspecified myositis type, unspecified site            CERVICAL TRIGGER POINT INJECTION(S)    Patient presents for repeat upper and lower cervical trigger point injections.  Had a nice response to injections 2-3 months ago. Brief exam is consistent with above impression.  Will repeat injections today.    Trigger Point Injection(s):  After informed oral consent, patient was prepped per routine. The bilateral upper and lower cervical paraspinal area(s) were injected. 40 mg of Kenalog with 1 cc of 0.25% Marcaine injected at each site. Patient tolerated well. Band aid(s) applied.     A total of four muscles/sites injected today for trigger point charge.    A total of 2 cc of Kenalog were administered during this procedure.    DEMETRIO LOPEZ JR, MD

## 2024-02-14 ENCOUNTER — TELEPHONE (OUTPATIENT)
Dept: PULMONOLOGY | Age: 84
End: 2024-02-14

## 2024-02-14 NOTE — TELEPHONE ENCOUNTER
Contacted patient, spoke with her & daughter; this medication is working out well for her so she would like to start getting it from [facility] (mail order), currently has enough supply for a couple of weeks.   Advised we like to be sure we are processing Rx with pharmacy of patient choice.    Contacted [facility] via telephone to provide DENIS#Gabriella Gage, pharmacist, information added to Rx so that it can be shipped to patient routinely.

## 2024-02-14 NOTE — TELEPHONE ENCOUNTER
Needs prescription resent for:  Pregabalin.  Prescription the received did not have DENIS# on this    [phone]

## 2024-02-28 NOTE — PROGRESS NOTES
She is an 83-year-old female w/ hx of COPD, bronchiectasis, upper lobe predominant emphysema, nocturnal hypoxia and prior tobacco use who is seen today in routine follow up.      Seen in ER 1/2024 secondary to new onset A-fib.  She was begun on Eliquis, and begun on Cardizem.     DIAGNOSTICS:       CPFT's 8/2015-flow volume loop reveals airway obstruction, no significant bronchodilator response, lung volumes were low normal.  Diffusion capacity was decreased, study is consistent with COPD.  Spirometry 9/2017-moderate obstructive defect with decreased FVC.  Chest CT 12/2017-stable right upper lobe groundglass nodule which has been stable dating back to February 2012.  Improving atelectasis in bilateral lower lobes.  Stable mild-moderate centrilobular emphysematous changes in the upper lobes.  Spirometry 9/18/2018-moderate obstructive defect with decreased FVC.  No significant interval change.  Spirometry 3/21/2019-moderately severe restrictive defect with obstructive pattern on expiratory loop.  Slight but statistically insignificant improvement in FVC and FEV1. Previous CPFT's demonstrated obstructive defect.  CT of chest 2/4/[id number] for PE.  Severe emphysema, increased interstitial markings bilaterally, bibasilar atelectasis or scarring.  Probable acute superimposed interstitial pneumonitis and alveolitis.  Chest CT 5/1/2020- upper lobe predominant centrilobular emphysema, no consolidation or infiltrate.  Lymph nodes normal.  O2 sat 5/6/2020 - at patient's home, RA rest 98%, heart rate 68 - 78;  RA w/ exertion 94%, heart rate 110  Spirometry 11/9/2020-moderate restrictive defect, technically limited study, no significant interval change in FVC and FEV.  Spirometry 9/2022 - flow volume loop hast late scooping suggesting obstruction. Spirometry suggests restriction with concomitnat mild obstruction at low lung volumes.   CXR 1/3/2023-lungs are clear.  No acute findings.

## 2024-02-29 ENCOUNTER — OFFICE VISIT (OUTPATIENT)
Dept: PULMONOLOGY | Age: 84
End: 2024-02-29
Payer: MEDICARE

## 2024-02-29 VITALS
SYSTOLIC BLOOD PRESSURE: 119 MMHG | DIASTOLIC BLOOD PRESSURE: 50 MMHG | RESPIRATION RATE: 17 BRPM | HEART RATE: 95 BPM | OXYGEN SATURATION: 99 % | TEMPERATURE: 98.6 F | HEIGHT: 63 IN | WEIGHT: 125 LBS | BODY MASS INDEX: 22.15 KG/M2

## 2024-02-29 DIAGNOSIS — J43.2 CENTRILOBULAR EMPHYSEMA (HCC): ICD-10-CM

## 2024-02-29 DIAGNOSIS — J47.1 BRONCHIECTASIS WITH ACUTE EXACERBATION (HCC): ICD-10-CM

## 2024-02-29 DIAGNOSIS — G47.34 NOCTURNAL HYPOXIA: ICD-10-CM

## 2024-02-29 DIAGNOSIS — F17.211 CIGARETTE NICOTINE DEPENDENCE IN REMISSION: ICD-10-CM

## 2024-02-29 DIAGNOSIS — J44.9 MODERATE COPD (CHRONIC OBSTRUCTIVE PULMONARY DISEASE) (HCC): ICD-10-CM

## 2024-02-29 DIAGNOSIS — J44.1 COPD EXACERBATION (HCC): Primary | ICD-10-CM

## 2024-02-29 LAB
EXPIRATORY TIME: NORMAL
FEF 25-75% %PRED-PRE: NORMAL
FEF 25-75% PRED: NORMAL
FEF 25-75-PRE: NORMAL
FEV1 %PRED-PRE: 47 %
FEV1 PRED: 1.76 L
FEV1/FVC %PRED-PRE: NORMAL
FEV1/FVC PRED: NORMAL
FEV1/FVC: 70 %
FEV1: 0.82 L
FVC %PRED-PRE: 50 %
FVC PRED: 2.33 L
FVC: 1.17 L
PEF %PRED-PRE: NORMAL
PEF PRED: NORMAL
PEF-PRE: NORMAL

## 2024-02-29 PROCEDURE — 99214 OFFICE O/P EST MOD 30 MIN: CPT | Performed by: NURSE PRACTITIONER

## 2024-02-29 PROCEDURE — 3078F DIAST BP <80 MM HG: CPT | Performed by: NURSE PRACTITIONER

## 2024-02-29 PROCEDURE — 3023F SPIROM DOC REV: CPT | Performed by: NURSE PRACTITIONER

## 2024-02-29 PROCEDURE — G8484 FLU IMMUNIZE NO ADMIN: HCPCS | Performed by: NURSE PRACTITIONER

## 2024-02-29 PROCEDURE — 1090F PRES/ABSN URINE INCON ASSESS: CPT | Performed by: NURSE PRACTITIONER

## 2024-02-29 PROCEDURE — G8427 DOCREV CUR MEDS BY ELIG CLIN: HCPCS | Performed by: NURSE PRACTITIONER

## 2024-02-29 PROCEDURE — 1036F TOBACCO NON-USER: CPT | Performed by: NURSE PRACTITIONER

## 2024-02-29 PROCEDURE — 3074F SYST BP LT 130 MM HG: CPT | Performed by: NURSE PRACTITIONER

## 2024-02-29 PROCEDURE — 1123F ACP DISCUSS/DSCN MKR DOCD: CPT | Performed by: NURSE PRACTITIONER

## 2024-02-29 PROCEDURE — G8399 PT W/DXA RESULTS DOCUMENT: HCPCS | Performed by: NURSE PRACTITIONER

## 2024-02-29 PROCEDURE — G8420 CALC BMI NORM PARAMETERS: HCPCS | Performed by: NURSE PRACTITIONER

## 2024-02-29 RX ORDER — SODIUM CHLORIDE FOR INHALATION 3 %
4 VIAL, NEBULIZER (ML) INHALATION PRN
Qty: 240 ML | Refills: 3 | Status: SHIPPED | OUTPATIENT
Start: 2024-02-29

## 2024-02-29 RX ORDER — ALBUTEROL SULFATE 90 UG/1
2 AEROSOL, METERED RESPIRATORY (INHALATION) EVERY 4 HOURS PRN
Qty: 3 EACH | Refills: 3 | Status: SHIPPED | OUTPATIENT
Start: 2024-02-29 | End: 2024-05-29

## 2024-02-29 RX ORDER — DOXYCYCLINE HYCLATE 100 MG
100 TABLET ORAL 2 TIMES DAILY
Qty: 14 TABLET | Refills: 0 | Status: SHIPPED | OUTPATIENT
Start: 2024-02-29 | End: 2024-03-07

## 2024-02-29 ASSESSMENT — PULMONARY FUNCTION TESTS
FEV1_PERCENT_PREDICTED_PRE: 47
FEV1: 0.82
FEV1/FVC: 70
FVC: 1.17
FVC_PREDICTED: 2.33
FVC_PERCENT_PREDICTED_PRE: 50
FEV1_PREDICTED: 1.76

## 2024-02-29 ASSESSMENT — ENCOUNTER SYMPTOMS
SHORTNESS OF BREATH: 1
COUGH: 1
SPUTUM PRODUCTION: 1
WHEEZING: 1
HEMOPTYSIS: 0

## 2024-02-29 NOTE — PROGRESS NOTES
[facility]  [address]  [place] [address]  [phone]    Patient Name:  Dae Correa    YOB: 1940    Office Visit 2/29/2024      CHIEF COMPLAINT:      Chief Complaint   Patient presents with    Follow-up    Cough     Bad cough since Sunday          ASSESSMENT:   (Medical Decision Making)                                                                                                                                          Encounter Diagnoses   Name Primary?    COPD exacerbation (HCC) Yes    Moderate COPD (chronic obstructive pulmonary disease) (HCC)     Centrilobular emphysema (HCC)     Bronchiectasis with acute exacerbation (HCC)     Nocturnal hypoxia     Cigarette nicotine dependence in remission      Recent onset of cough productive of purulent sputum, difficult to expectorate, suboptimal compliance with flutter valve, using albuterol via nebulizer on average of 3 times daily.  Compliant with Trelegy.  There has been decline in spirometry although technically limited today.    She has supply of prednisone 10 mg on hand from other providers.  Tolerates it without difficulty.  Discussed taking taper,                      PLAN:     Doxycycline 100 mg twice daily for 7 days.    She has supply of prednisone 10 mg-advised to take as follows:    2 tablets for 3 days following morning meal, 1-1/2 tablets for 3 days, one half tablet for 3 days, then stop.    Begin nebulized saline 4 mL twice daily following albuterol via nebulizer.    May use albuterol via nebulizer up to 4 times daily.    Use flutter valve following nebulizer treatments.  OTC mucolytic, (mucinex or generic equivalent of guaifenesin), 2400mg in 24 hours in divided doses as needed to thin mucous.    Continue Trelegy 1 inhalation every morning.    Continue Daliresp.    Continue oxygen with sleep.    Advised to contact us if she fails to improve or worsens.    Orders Placed This Encounter

## 2024-02-29 NOTE — PATIENT INSTRUCTIONS
Doxycycline 100 mg twice daily for 7 days.    She has supply of prednisone 10 mg-advised to take as follows:    2 tablets for 3 days following morning meal, 1-1/2 tablets for 3 days, one half tablet for 3 days, then stop.    Begin nebulized saline 4 mL twice daily following albuterol via nebulizer.    May use albuterol via nebulizer up to 4 times daily.    Use flutter valve following nebulizer treatments.  OTC mucolytic, (mucinex or generic equivalent of guaifenesin), 2400mg in 24 hours in divided doses as needed to thin mucous.    Continue Trelegy 1 inhalation every morning.    Continue Daliresp.    Continue oxygen with sleep.    Advised to contact us if she fails to improve or worsens.

## 2024-03-07 ENCOUNTER — TELEPHONE (OUTPATIENT)
Dept: PULMONOLOGY | Age: 84
End: 2024-03-07

## 2024-03-07 NOTE — TELEPHONE ENCOUNTER
TRIAGE CALL      Complaint: cough/   Cough: yes   Productive:  she cough but it gets stuck in her throat   Bloody Sputum:  no  Increased SOB/Wheezing:  yes when she gets up moving a little  Duration: 1 week   Fever/Chills: no  OTC Meds tried: no    The antibiotic is not done any good today is the last diose.

## 2024-03-07 NOTE — TELEPHONE ENCOUNTER
Last seen: 2/29/24  Hx: COPD, bronchiectasis, nocturnal hypoxia    Last visit Rx for doxycycline, steroid instructions, albuterol neb BID, followed by flutter valve and 1200mg mucolytic BID, trelegy, O2 @ HS.    Contacted daughter regarding s/s, notes that also having edema at feet; awhile back started 2 lyrica and helped a lot but had pedal edema and had to go down to 1 daily; since being on antibiotic recently has had pedal edema again; also recent issues w/ a.Fib, HR generally staying in the 90s.  Finishing doxycycline today, last dose tonight. Has appt next week w/ PC.   Using albuterol TID, not using flutter valve, not taking mucinex stopped during the antibiotic course b/c she was feeling better; just return of thick & more mucous in last 2 days; next 2 days planning 10mg prednisone.  Asking for next steps.

## 2024-03-08 ENCOUNTER — TELEPHONE (OUTPATIENT)
Age: 84
End: 2024-03-08

## 2024-03-08 RX ORDER — FUROSEMIDE 20 MG/1
20 TABLET ORAL DAILY
Qty: 7 TABLET | Refills: 1 | Status: SHIPPED | OUTPATIENT
Start: 2024-03-08

## 2024-03-08 RX ORDER — DOXYCYCLINE HYCLATE 100 MG
100 TABLET ORAL 2 TIMES DAILY
Qty: 20 TABLET | Refills: 2 | Status: SHIPPED | OUTPATIENT
Start: 2024-03-08 | End: 2024-04-07

## 2024-03-08 RX ORDER — PREDNISONE 10 MG/1
TABLET ORAL
Qty: 30 TABLET | Refills: 0 | Status: SHIPPED | OUTPATIENT
Start: 2024-03-08

## 2024-03-08 NOTE — TELEPHONE ENCOUNTER
Pt daughter called and stated that her mom was started on eliquis and diltlazem back in January. Pt daughter stated pt HR will shoot up to 100-105 then drop back down to the 90s. Pt feet have started to swell and pt was weighing around 120 and the most recent time she weighted she was 127. Pt daughter stated that was in about a week and a half of a 7 lb gain. Please call and advise

## 2024-03-08 NOTE — TELEPHONE ENCOUNTER
Spoke to pt's daughter. States pt having edema in BLE, worse during the day, improves overnight but does not go away. Has also called pulmonary today, was given Rx for doxy, prednisone, and furosemide. Recommend taking meds prescribed by pulmonary as prescribed, keeping feet elevated above the level of her bladder, compression socks when OOB, daily weights per protocol. Call back if weight gain >2# overnight or 5# in a week. Verb understanding.

## 2024-03-08 NOTE — TELEPHONE ENCOUNTER
Received the message this AM - Thanks for the detailed information. Glad to hear they initiated RX for ATB therapy.     Recommendations:  Please resume mucinex medication,   Push nebulizer to ensure every 4-6 hours use to keep airways OPEN to clear secretions.   Consider resuming flutter valve.   Routed taper pack of prednisone, but recognize concerns for swelling. RX routed for lasix 20mg for 7 days only.  Sent RX for ATB therapy but do not intend for them to use another round at this point.   Monitor weight and pulse - seek ED if HR greater than 120 beats per minute OR if she is feeling light headed/dizzy.  Any changes in status, please seek local emergency department.   Look forward to further evaluation at the next appointment.  Palliative care NP will certainly coordinate with Pulmonary provider, Pj Fuller, for further care management on Tuesday, 3/12/2024.

## 2024-03-08 NOTE — TELEPHONE ENCOUNTER
Patients daughter says that she feels that she needs this before the weekend . Says that the antibiotic will probably clear her up , ask if this could be told to Marychuy Fuller

## 2024-03-12 ENCOUNTER — TELEMEDICINE (OUTPATIENT)
Age: 84
End: 2024-03-12
Payer: MEDICARE

## 2024-03-12 ENCOUNTER — HOME HEALTH ADMISSION (OUTPATIENT)
Dept: HOME HEALTH SERVICES | Facility: HOME HEALTH | Age: 84
End: 2024-03-12
Payer: MEDICARE

## 2024-03-12 ENCOUNTER — TELEPHONE (OUTPATIENT)
Dept: PULMONOLOGY | Age: 84
End: 2024-03-12

## 2024-03-12 DIAGNOSIS — R53.81 DEBILITY: ICD-10-CM

## 2024-03-12 DIAGNOSIS — R60.0 LOCALIZED EDEMA: ICD-10-CM

## 2024-03-12 DIAGNOSIS — I48.20 CHRONIC ATRIAL FIBRILLATION (HCC): ICD-10-CM

## 2024-03-12 DIAGNOSIS — F40.298 FEAR OF DEATH: ICD-10-CM

## 2024-03-12 DIAGNOSIS — Z51.5 PALLIATIVE CARE PATIENT: ICD-10-CM

## 2024-03-12 DIAGNOSIS — J96.11 CHRONIC RESPIRATORY FAILURE WITH HYPOXIA (HCC): ICD-10-CM

## 2024-03-12 PROCEDURE — 99214 OFFICE O/P EST MOD 30 MIN: CPT | Performed by: NURSE PRACTITIONER

## 2024-03-12 PROCEDURE — G8427 DOCREV CUR MEDS BY ELIG CLIN: HCPCS | Performed by: NURSE PRACTITIONER

## 2024-03-12 PROCEDURE — G8420 CALC BMI NORM PARAMETERS: HCPCS | Performed by: NURSE PRACTITIONER

## 2024-03-12 PROCEDURE — 1090F PRES/ABSN URINE INCON ASSESS: CPT | Performed by: NURSE PRACTITIONER

## 2024-03-12 PROCEDURE — G8399 PT W/DXA RESULTS DOCUMENT: HCPCS | Performed by: NURSE PRACTITIONER

## 2024-03-12 PROCEDURE — 1036F TOBACCO NON-USER: CPT | Performed by: NURSE PRACTITIONER

## 2024-03-12 PROCEDURE — G8484 FLU IMMUNIZE NO ADMIN: HCPCS | Performed by: NURSE PRACTITIONER

## 2024-03-12 PROCEDURE — 1123F ACP DISCUSS/DSCN MKR DOCD: CPT | Performed by: NURSE PRACTITIONER

## 2024-03-12 NOTE — TELEPHONE ENCOUNTER
Val from [facility] ask if Bridgett Moseley would add A.Fib into the orders .      [phone]

## 2024-03-12 NOTE — PROGRESS NOTES
Dae Correa, was evaluated through a synchronous (real-time) audio-video encounter. The patient (or guardian if applicable) is aware that this is a billable service, which includes applicable co-pays. This Virtual Visit was conducted with patient's (and/or legal guardian's) consent. Patient identification was verified, and a caregiver was present when appropriate.   The patient was located at Home: [address] [place] [id number]  Provider was located at Facility (Appt Dept): [facility] [address]  [place] [address]      Dae Correa (:  1940) is a Established patient, presenting virtually for evaluation of the following:    Assessment & Plan   Below is the assessment and plan developed based on review of pertinent history, physical exam, labs, studies, and medications.    1. Localized edema - Struggling with lower extremity swelling that hasn't responded yet to 4 days of Lasix. We have had some intermittent issues with swelling that seems to have responded to Lasix but not so much this round. She has taken 4 days at this point. We have some retained fluid due to gabapentin but have decided to REMAIN on this medication and deal with some mild swelling because it helps her pain so very much. Looks to see if HH might assess to determine if leg wraps would/could be helpful.   -     [facility] [place]    2. Chronic respiratory failure with hypoxia (HCC) - Had to be placed on ATB + Steroid therapy last week. Secretions slightly better but will likely continue to have some trouble. Known condition but patient may be struggling to accept. She expresses a fear of the unknown, dying, and dying process. Discussed using her  or local spiritual leaders a bit more for conversations and navigation of this topic.     3. Chronic atrial fibrillation (HCC) - Appropriate questions asked about AF related to fluid issues, SPO2 levels, etc. Known condition that seems to have rate

## 2024-03-13 ASSESSMENT — ENCOUNTER SYMPTOMS: COUGH: 1

## 2024-03-14 PROCEDURE — 0221000100 HH NO PAY CLAIM PROCEDURE

## 2024-03-15 ENCOUNTER — TELEPHONE (OUTPATIENT)
Age: 84
End: 2024-03-15
Payer: MEDICARE

## 2024-03-15 DIAGNOSIS — I25.10 CORONARY ARTERY DISEASE INVOLVING NATIVE CORONARY ARTERY OF NATIVE HEART WITHOUT ANGINA PECTORIS: ICD-10-CM

## 2024-03-15 DIAGNOSIS — I48.91 ATRIAL FIBRILLATION WITH RVR (HCC): Primary | ICD-10-CM

## 2024-03-15 PROCEDURE — 99441 PR PHYS/QHP TELEPHONE EVALUATION 5-10 MIN: CPT | Performed by: INTERNAL MEDICINE

## 2024-03-15 NOTE — TELEPHONE ENCOUNTER
Daughter reports HR was up in the 140s in the middle of the night.  It lasted for ~ 10 minutes and patients daughter gave her an additional Cardizem and it came down to the 120s.  This morning her HR is 77-88 and BP is 139/75.  She denies pain, SOB or fever.  Please advise.

## 2024-03-15 NOTE — TELEPHONE ENCOUNTER
Pt daughter states that pts HR last night was 120, 143, and 146. States that pt took a pill and it went down. Wants to know what number they need to look for when its an emergency.

## 2024-03-15 NOTE — TELEPHONE ENCOUNTER
Patient initiated outside phone call received.  Time spent reviewing chart, old records, formulating a plan, and responding is greater than 5 minutes.    The patient's most recent encounter has been reviewed.  Medications and appropriate lab work have been reviewed.  Appropriate imaging studies were reviewed.     Formulated plan and response:  Continue Eliquis without interruption.  Continue Cardizem once daily but if she feels like her heart is racing more, okay to increase Cardizem to twice daily.  If tachycardia and palpitations persist call us back and we may need to consider an antiarrhythmic    A. Dimitry Randolph MD

## 2024-03-15 NOTE — TELEPHONE ENCOUNTER
Response received from Dr Randolph.  Called patient's daughter Suzette back and gave her the following instructions:  1 - Continue Eliquis without interruption  2 - Continue Cardizem once daily but if she feels like her heart is racing more, okay to increase Cardizem to twice daily. If HR continues to stay above 100 and palpitations persist call us back and we may need to consider an antiarrhythmic.  Suzette verbalized understanding of the above information.

## 2024-03-18 ENCOUNTER — HOME CARE VISIT (OUTPATIENT)
Dept: SCHEDULING | Facility: HOME HEALTH | Age: 84
End: 2024-03-18

## 2024-03-18 VITALS
OXYGEN SATURATION: 99 % | TEMPERATURE: 97.9 F | HEART RATE: 94 BPM | SYSTOLIC BLOOD PRESSURE: 122 MMHG | RESPIRATION RATE: 20 BRPM | DIASTOLIC BLOOD PRESSURE: 64 MMHG

## 2024-03-18 PROCEDURE — G0299 HHS/HOSPICE OF RN EA 15 MIN: HCPCS

## 2024-03-18 ASSESSMENT — ENCOUNTER SYMPTOMS
STOOL DESCRIPTION: FORMED
DYSPNEA ACTIVITY LEVEL: AT REST
PAIN LOCATION - PAIN QUALITY: ACHE

## 2024-03-21 ENCOUNTER — HOME CARE VISIT (OUTPATIENT)
Dept: HOME HEALTH SERVICES | Facility: HOME HEALTH | Age: 84
End: 2024-03-21

## 2024-03-22 ENCOUNTER — TELEPHONE (OUTPATIENT)
Age: 84
End: 2024-03-22
Payer: MEDICARE

## 2024-03-22 DIAGNOSIS — I25.10 CORONARY ARTERY DISEASE INVOLVING NATIVE CORONARY ARTERY OF NATIVE HEART WITHOUT ANGINA PECTORIS: ICD-10-CM

## 2024-03-22 DIAGNOSIS — R60.0 BILATERAL LEG EDEMA: Primary | ICD-10-CM

## 2024-03-22 DIAGNOSIS — G47.33 OSA ON CPAP: ICD-10-CM

## 2024-03-22 DIAGNOSIS — I10 PRIMARY HYPERTENSION: ICD-10-CM

## 2024-03-22 PROCEDURE — 99442 PR PHYS/QHP TELEPHONE EVALUATION 11-20 MIN: CPT | Performed by: INTERNAL MEDICINE

## 2024-03-22 RX ORDER — METOPROLOL SUCCINATE 50 MG/1
50 TABLET, EXTENDED RELEASE ORAL DAILY
Qty: 30 TABLET | Refills: 1 | Status: SHIPPED | OUTPATIENT
Start: 2024-03-22

## 2024-03-22 NOTE — TELEPHONE ENCOUNTER
Patient initiated outside phone call received.  Time spent reviewing chart, old records, formulating a plan, and responding is greater than 11 minutes.    The patient's most recent encounter has been reviewed.  Medications and appropriate lab work have been reviewed.  Appropriate imaging studies were reviewed.     Formulated plan and response:    Stop Cardizem CD, edema most likely due to dependent edema with venous insufficiency and calcium channel blockers on board  Give Toprol-XL 50 mg daily, first dose tomorrow morning, 30 pills with 11 refills  2 g sodium intake daily or less  Continue current fluid intake  Elevate legs is much as possible  Continue Lasix 20 mg daily but if possible, take 40 mg tomorrow and Sunday and elevate legs is much as possible.  This should mobilize a good amount of fluid and then she can go back to 20 mg daily on an as-needed basis.  If still swollen on Monday or Tuesday give us a call.    ZANDER Randolph MD

## 2024-03-22 NOTE — TELEPHONE ENCOUNTER
Spoke with [facility] home heal and review  note. Pt v/u    Requested Prescriptions     Signed Prescriptions Disp Refills    metoprolol succinate (TOPROL XL) 50 MG extended release tablet 30 tablet 1     Sig: Take 1 tablet by mouth daily     Authorizing Provider: SUJATA TUTTLE     Ordering User: ED VIERA

## 2024-03-22 NOTE — TELEPHONE ENCOUNTER
Spoke with [facility] nurse she stated pt home health.    Pt have +4 pitting edmea in her ankle and feet    Frequent nocturia    Pt has loss 2-3lb is pass 2 days     And drinks 40-46oz of water.    Pt is taking lasix 20mg     Pt is on diltiazem 120mg and they wonder if this is the cause?

## 2024-03-22 NOTE — TELEPHONE ENCOUNTER
Could the Cardizem be causing her swelling? PCP doesn't want a lot of diuretics because of vascular Please call and see if they can change from something else .

## 2024-03-25 ENCOUNTER — HOME CARE VISIT (OUTPATIENT)
Dept: SCHEDULING | Facility: HOME HEALTH | Age: 84
End: 2024-03-25

## 2024-03-25 VITALS
SYSTOLIC BLOOD PRESSURE: 132 MMHG | TEMPERATURE: 97.3 F | DIASTOLIC BLOOD PRESSURE: 70 MMHG | RESPIRATION RATE: 20 BRPM | OXYGEN SATURATION: 91 % | HEART RATE: 99 BPM

## 2024-03-25 PROCEDURE — G0299 HHS/HOSPICE OF RN EA 15 MIN: HCPCS

## 2024-03-25 ASSESSMENT — ENCOUNTER SYMPTOMS
PAIN LOCATION - PAIN QUALITY: ACHE
STOOL DESCRIPTION: SOFT FORMED
DYSPNEA ACTIVITY LEVEL: AFTER AMBULATING 10 - 20 FT

## 2024-03-27 ENCOUNTER — HOME CARE VISIT (OUTPATIENT)
Dept: SCHEDULING | Facility: HOME HEALTH | Age: 84
End: 2024-03-27

## 2024-03-27 PROCEDURE — G0299 HHS/HOSPICE OF RN EA 15 MIN: HCPCS

## 2024-03-28 VITALS
DIASTOLIC BLOOD PRESSURE: 64 MMHG | RESPIRATION RATE: 18 BRPM | TEMPERATURE: 98.2 F | OXYGEN SATURATION: 96 % | HEART RATE: 70 BPM | SYSTOLIC BLOOD PRESSURE: 118 MMHG

## 2024-03-28 ASSESSMENT — ENCOUNTER SYMPTOMS
COUGH CHARACTERISTICS: PRODUCTIVE
STOOL DESCRIPTION: FORMED
SPUTUM COLOR: PINK
SPUTUM AMOUNT: SCANT
NAUSEA: 1
DYSPNEA ACTIVITY LEVEL: WHILE SPEAKING
SPUTUM CONSISTENCY: THICK
COUGH: 1
SPUTUM PRODUCTION: 1

## 2024-03-29 ENCOUNTER — HOME CARE VISIT (OUTPATIENT)
Dept: SCHEDULING | Facility: HOME HEALTH | Age: 84
End: 2024-03-29

## 2024-04-02 ENCOUNTER — HOME CARE VISIT (OUTPATIENT)
Dept: SCHEDULING | Facility: HOME HEALTH | Age: 84
End: 2024-04-02

## 2024-04-02 VITALS
TEMPERATURE: 97.7 F | OXYGEN SATURATION: 96 % | DIASTOLIC BLOOD PRESSURE: 64 MMHG | HEART RATE: 84 BPM | RESPIRATION RATE: 16 BRPM | SYSTOLIC BLOOD PRESSURE: 118 MMHG

## 2024-04-02 PROCEDURE — G0299 HHS/HOSPICE OF RN EA 15 MIN: HCPCS

## 2024-04-02 ASSESSMENT — ENCOUNTER SYMPTOMS
STOOL DESCRIPTION: SMALL
DYSPNEA ACTIVITY LEVEL: AT REST

## 2024-04-04 ENCOUNTER — HOME CARE VISIT (OUTPATIENT)
Dept: SCHEDULING | Facility: HOME HEALTH | Age: 84
End: 2024-04-04

## 2024-04-04 VITALS
SYSTOLIC BLOOD PRESSURE: 116 MMHG | RESPIRATION RATE: 16 BRPM | HEART RATE: 99 BPM | DIASTOLIC BLOOD PRESSURE: 58 MMHG | TEMPERATURE: 97.8 F | OXYGEN SATURATION: 95 %

## 2024-04-04 PROCEDURE — G0299 HHS/HOSPICE OF RN EA 15 MIN: HCPCS

## 2024-04-04 ASSESSMENT — ENCOUNTER SYMPTOMS
STOOL DESCRIPTION: SMALL
DYSPNEA ACTIVITY LEVEL: AFTER AMBULATING LESS THAN 10 FT

## 2024-04-06 ENCOUNTER — TELEPHONE (OUTPATIENT)
Dept: PULMONOLOGY | Age: 84
End: 2024-04-06

## 2024-04-06 NOTE — TELEPHONE ENCOUNTER
Reached out to see how things were going - able to speak to Suzette's . Turns out the patient wasn't taking her antidepressant and unsure how long she had been off??? This is troubling in some ways consider we reviewed all the medications during the home visit last Saturday and was STRONGLY advise to remain on duloxetine.  services had been coming also to the house and there had not been any discussions about missing this critical medication. The patient is BACK on this medication and her spirits are lifting and she is eating now. STRONGLY advised to watch the patient orally swallow her antidepressant from now on.

## 2024-04-09 ENCOUNTER — HOME CARE VISIT (OUTPATIENT)
Dept: SCHEDULING | Facility: HOME HEALTH | Age: 84
End: 2024-04-09

## 2024-04-09 VITALS
OXYGEN SATURATION: 94 % | TEMPERATURE: 98.2 F | DIASTOLIC BLOOD PRESSURE: 54 MMHG | RESPIRATION RATE: 16 BRPM | HEART RATE: 76 BPM | SYSTOLIC BLOOD PRESSURE: 98 MMHG

## 2024-04-09 DIAGNOSIS — R60.0 BILATERAL LEG EDEMA: ICD-10-CM

## 2024-04-09 PROCEDURE — G0299 HHS/HOSPICE OF RN EA 15 MIN: HCPCS

## 2024-04-09 RX ORDER — METOPROLOL SUCCINATE 50 MG/1
50 TABLET, EXTENDED RELEASE ORAL DAILY
Qty: 90 TABLET | Refills: 3 | Status: SHIPPED | OUTPATIENT
Start: 2024-04-09

## 2024-04-09 ASSESSMENT — ENCOUNTER SYMPTOMS
DYSPNEA ACTIVITY LEVEL: AFTER AMBULATING MORE THAN 20 FT
STOOL DESCRIPTION: FORMED

## 2024-04-09 NOTE — TELEPHONE ENCOUNTER
Requested Prescriptions     Pending Prescriptions Disp Refills    metoprolol succinate (TOPROL XL) 50 MG extended release tablet 90 tablet 3     Sig: Take 1 tablet by mouth daily

## 2024-04-09 NOTE — TELEPHONE ENCOUNTER
Pt called and stated that she was switched from Diltiazem to Metoprolol and there was only 2 refills sent in for the Metoprolol. Asked if she needs to keep taking it, stated she is doing very well on it compared to the Diltiazem. Asked if it can be called in to [facility] pharmacy, and to call her when its been done. Please call and advise

## 2024-04-10 ENCOUNTER — HOME CARE VISIT (OUTPATIENT)
Dept: HOME HEALTH SERVICES | Facility: HOME HEALTH | Age: 84
End: 2024-04-10

## 2024-04-11 ENCOUNTER — HOME CARE VISIT (OUTPATIENT)
Dept: SCHEDULING | Facility: HOME HEALTH | Age: 84
End: 2024-04-11

## 2024-04-11 VITALS
OXYGEN SATURATION: 97 % | HEART RATE: 81 BPM | DIASTOLIC BLOOD PRESSURE: 52 MMHG | TEMPERATURE: 97.7 F | RESPIRATION RATE: 16 BRPM | SYSTOLIC BLOOD PRESSURE: 118 MMHG

## 2024-04-11 VITALS
HEART RATE: 74 BPM | DIASTOLIC BLOOD PRESSURE: 82 MMHG | SYSTOLIC BLOOD PRESSURE: 114 MMHG | TEMPERATURE: 98.2 F | RESPIRATION RATE: 16 BRPM | OXYGEN SATURATION: 96 %

## 2024-04-11 PROCEDURE — G0299 HHS/HOSPICE OF RN EA 15 MIN: HCPCS

## 2024-04-11 PROCEDURE — G0152 HHCP-SERV OF OT,EA 15 MIN: HCPCS

## 2024-04-11 ASSESSMENT — ENCOUNTER SYMPTOMS
DYSPNEA ACTIVITY LEVEL: AFTER AMBULATING LESS THAN 10 FT
DYSPNEA ACTIVITY LEVEL: AFTER AMBULATING 10 - 20 FT
SPUTUM CONSISTENCY: THICK
COUGH CHARACTERISTICS: PRODUCTIVE
STOOL DESCRIPTION: FORMED
SPUTUM AMOUNT: SCANT
DOUBLE VISION: 0
SPUTUM PRODUCTION: 1
COUGH: 1
BOWEL INCONTINENCE: 1
COUGH: 1
SPUTUM COLOR: WHITE
DIARRHEA: 1
COUGH CHARACTERISTICS: PRODUCTIVE

## 2024-04-12 ENCOUNTER — HOME CARE VISIT (OUTPATIENT)
Dept: SCHEDULING | Facility: HOME HEALTH | Age: 84
End: 2024-04-12

## 2024-04-12 VITALS
SYSTOLIC BLOOD PRESSURE: 102 MMHG | RESPIRATION RATE: 16 BRPM | OXYGEN SATURATION: 98 % | HEART RATE: 81 BPM | DIASTOLIC BLOOD PRESSURE: 64 MMHG | TEMPERATURE: 97.9 F

## 2024-04-12 PROCEDURE — G0151 HHCP-SERV OF PT,EA 15 MIN: HCPCS

## 2024-04-12 ASSESSMENT — ENCOUNTER SYMPTOMS
COUGH: 1
DYSPNEA ACTIVITY LEVEL: AFTER AMBULATING 10 - 20 FT
COUGH CHARACTERISTICS: NON-PRODUCTIVE
PAIN LOCATION - PAIN QUALITY: ACHE, SHARP

## 2024-04-16 ENCOUNTER — HOME CARE VISIT (OUTPATIENT)
Dept: SCHEDULING | Facility: HOME HEALTH | Age: 84
End: 2024-04-16

## 2024-04-16 ENCOUNTER — TELEPHONE (OUTPATIENT)
Age: 84
End: 2024-04-16

## 2024-04-16 VITALS
OXYGEN SATURATION: 96 % | TEMPERATURE: 98.5 F | HEART RATE: 80 BPM | RESPIRATION RATE: 16 BRPM | DIASTOLIC BLOOD PRESSURE: 48 MMHG | SYSTOLIC BLOOD PRESSURE: 130 MMHG

## 2024-04-16 VITALS
RESPIRATION RATE: 16 BRPM | BODY MASS INDEX: 20.24 KG/M2 | HEART RATE: 75 BPM | WEIGHT: 114.25 LBS | SYSTOLIC BLOOD PRESSURE: 104 MMHG | DIASTOLIC BLOOD PRESSURE: 48 MMHG | TEMPERATURE: 98.4 F | OXYGEN SATURATION: 93 %

## 2024-04-16 VITALS
DIASTOLIC BLOOD PRESSURE: 62 MMHG | HEART RATE: 69 BPM | RESPIRATION RATE: 16 BRPM | SYSTOLIC BLOOD PRESSURE: 124 MMHG | OXYGEN SATURATION: 94 %

## 2024-04-16 PROCEDURE — G0299 HHS/HOSPICE OF RN EA 15 MIN: HCPCS

## 2024-04-16 PROCEDURE — G0157 HHC PT ASSISTANT EA 15: HCPCS

## 2024-04-16 PROCEDURE — G0158 HHC OT ASSISTANT EA 15: HCPCS

## 2024-04-16 ASSESSMENT — ENCOUNTER SYMPTOMS
DYSPNEA ACTIVITY LEVEL: AFTER AMBULATING MORE THAN 20 FT
STOOL DESCRIPTION: FORMED
PAIN LOCATION - PAIN QUALITY: ACHY

## 2024-04-16 NOTE — TELEPHONE ENCOUNTER
Stanislaw says he just seen her. BP is low - 110/46, 132/48, 130/48.    She is asymptomatic. He did give her a good bit of water. He did review symptoms to monitor with care giver at the home.

## 2024-04-17 ENCOUNTER — HOME CARE VISIT (OUTPATIENT)
Dept: SCHEDULING | Facility: HOME HEALTH | Age: 84
End: 2024-04-17

## 2024-04-17 VITALS
RESPIRATION RATE: 16 BRPM | DIASTOLIC BLOOD PRESSURE: 72 MMHG | HEART RATE: 89 BPM | OXYGEN SATURATION: 94 % | TEMPERATURE: 98.7 F | SYSTOLIC BLOOD PRESSURE: 142 MMHG

## 2024-04-17 LAB
ANION GAP SERPL CALC-SCNC: 5 MMOL/L (ref 2–11)
APPEARANCE UR: CLEAR
BILIRUB UR QL: NEGATIVE
BUN SERPL-MCNC: 11 MG/DL (ref 8–23)
CALCIUM SERPL-MCNC: 9.2 MG/DL (ref 8.3–10.4)
CHLORIDE SERPL-SCNC: 108 MMOL/L (ref 103–113)
CO2 SERPL-SCNC: 25 MMOL/L (ref 21–32)
COLOR UR: NORMAL
CREAT SERPL-MCNC: 0.8 MG/DL (ref 0.6–1)
GLUCOSE SERPL-MCNC: 105 MG/DL (ref 65–100)
GLUCOSE UR STRIP.AUTO-MCNC: NEGATIVE MG/DL
HGB UR QL STRIP: NEGATIVE
KETONES UR QL STRIP.AUTO: NEGATIVE MG/DL
LEUKOCYTE ESTERASE UR QL STRIP.AUTO: NEGATIVE
NITRITE UR QL STRIP.AUTO: NEGATIVE
PH UR STRIP: 6 (ref 5–9)
POTASSIUM SERPL-SCNC: 3.6 MMOL/L (ref 3.5–5.1)
PROT UR STRIP-MCNC: NEGATIVE MG/DL
SODIUM SERPL-SCNC: 138 MMOL/L (ref 136–146)
SP GR UR REFRACTOMETRY: 1 (ref 1–1.02)
UROBILINOGEN UR QL STRIP.AUTO: 0.2 EU/DL (ref 0.2–1)

## 2024-04-17 PROCEDURE — G0299 HHS/HOSPICE OF RN EA 15 MIN: HCPCS

## 2024-04-18 ENCOUNTER — HOME CARE VISIT (OUTPATIENT)
Dept: SCHEDULING | Facility: HOME HEALTH | Age: 84
End: 2024-04-18

## 2024-04-18 ENCOUNTER — TELEPHONE (OUTPATIENT)
Dept: PULMONOLOGY | Age: 84
End: 2024-04-18

## 2024-04-18 VITALS
SYSTOLIC BLOOD PRESSURE: 106 MMHG | TEMPERATURE: 98.4 F | HEART RATE: 74 BPM | DIASTOLIC BLOOD PRESSURE: 52 MMHG | OXYGEN SATURATION: 93 % | RESPIRATION RATE: 16 BRPM

## 2024-04-18 VITALS
TEMPERATURE: 97.9 F | SYSTOLIC BLOOD PRESSURE: 162 MMHG | DIASTOLIC BLOOD PRESSURE: 64 MMHG | RESPIRATION RATE: 16 BRPM | OXYGEN SATURATION: 98 % | HEART RATE: 76 BPM

## 2024-04-18 PROCEDURE — G0299 HHS/HOSPICE OF RN EA 15 MIN: HCPCS

## 2024-04-18 PROCEDURE — G0157 HHC PT ASSISTANT EA 15: HCPCS

## 2024-04-18 ASSESSMENT — ENCOUNTER SYMPTOMS
STOOL DESCRIPTION: FORMED
DYSPNEA ACTIVITY LEVEL: AFTER AMBULATING MORE THAN 20 FT
PAIN LOCATION - PAIN QUALITY: BURNING

## 2024-04-18 NOTE — TELEPHONE ENCOUNTER
Stanislaw with home therapy called to report that the patients blood pressure this morning was 138/44 and 162/64 the second time he took it.

## 2024-04-18 NOTE — TELEPHONE ENCOUNTER
Direct conversation w/ NP; she talked to patient & family yesterday, family is not allowing her to give her own meds, concerned she is getting into her own meds; no lab indication of other issues; may need to reduce metroprolol, but then reviewed actual b/p #'s and no intervention needed yet.      Updated Stanislaw SARMIENTO.

## 2024-04-18 NOTE — TELEPHONE ENCOUNTER
Last seen: 3/12/24 palliative virtual  Hx: COPD, bronchiectasis, nocturnal hypoxia        Changes to bp meds 3/22 by cardiology, routine plan for lasix is 20mg daily PRN. Telephone note by PC NP 4/6 regarding concerns w/ missed medication.    4/16 Call from  RN reporting low bp; asymptomatic and encouraged adequate hydration.   4/17 SN prn visit today. /72, pt asymptomatic. Lab draw and urine specimen collected to take to [facility] lab. SN will visit with patient tomorrrow for routine visit.

## 2024-04-19 ENCOUNTER — HOME CARE VISIT (OUTPATIENT)
Dept: SCHEDULING | Facility: HOME HEALTH | Age: 84
End: 2024-04-19

## 2024-04-19 VITALS
RESPIRATION RATE: 16 BRPM | SYSTOLIC BLOOD PRESSURE: 125 MMHG | OXYGEN SATURATION: 97 % | HEART RATE: 67 BPM | DIASTOLIC BLOOD PRESSURE: 65 MMHG | TEMPERATURE: 98 F

## 2024-04-19 PROCEDURE — G0158 HHC OT ASSISTANT EA 15: HCPCS

## 2024-04-20 NOTE — TELEPHONE ENCOUNTER
Discussed with Jeanie  on 4/18/2024. No evidence of hypotension at this point. The patient's cognitive status has significantly improved. No changes to medication at this point.

## 2024-04-22 ENCOUNTER — HOME CARE VISIT (OUTPATIENT)
Dept: SCHEDULING | Facility: HOME HEALTH | Age: 84
End: 2024-04-22
Payer: MEDICARE

## 2024-04-22 PROCEDURE — G0158 HHC OT ASSISTANT EA 15: HCPCS

## 2024-04-23 ENCOUNTER — HOME CARE VISIT (OUTPATIENT)
Dept: SCHEDULING | Facility: HOME HEALTH | Age: 84
End: 2024-04-23
Payer: MEDICARE

## 2024-04-23 VITALS
SYSTOLIC BLOOD PRESSURE: 116 MMHG | DIASTOLIC BLOOD PRESSURE: 62 MMHG | OXYGEN SATURATION: 95 % | TEMPERATURE: 98.3 F | RESPIRATION RATE: 16 BRPM | HEART RATE: 73 BPM

## 2024-04-23 VITALS
OXYGEN SATURATION: 96 % | RESPIRATION RATE: 16 BRPM | DIASTOLIC BLOOD PRESSURE: 66 MMHG | HEART RATE: 74 BPM | SYSTOLIC BLOOD PRESSURE: 130 MMHG | TEMPERATURE: 97.9 F

## 2024-04-23 PROCEDURE — G0299 HHS/HOSPICE OF RN EA 15 MIN: HCPCS

## 2024-04-23 ASSESSMENT — ENCOUNTER SYMPTOMS
STOOL DESCRIPTION: FORMED
DYSPNEA ACTIVITY LEVEL: AFTER AMBULATING MORE THAN 20 FT

## 2024-04-24 ENCOUNTER — HOME CARE VISIT (OUTPATIENT)
Dept: SCHEDULING | Facility: HOME HEALTH | Age: 84
End: 2024-04-24
Payer: MEDICARE

## 2024-04-24 ENCOUNTER — HOME CARE VISIT (OUTPATIENT)
Dept: HOME HEALTH SERVICES | Facility: HOME HEALTH | Age: 84
End: 2024-04-24
Payer: MEDICARE

## 2024-04-24 VITALS
OXYGEN SATURATION: 96 % | RESPIRATION RATE: 16 BRPM | DIASTOLIC BLOOD PRESSURE: 60 MMHG | SYSTOLIC BLOOD PRESSURE: 130 MMHG | HEART RATE: 71 BPM | TEMPERATURE: 97.7 F

## 2024-04-24 VITALS
RESPIRATION RATE: 16 BRPM | SYSTOLIC BLOOD PRESSURE: 108 MMHG | OXYGEN SATURATION: 96 % | DIASTOLIC BLOOD PRESSURE: 44 MMHG | TEMPERATURE: 97.8 F | HEART RATE: 76 BPM

## 2024-04-24 PROCEDURE — G0158 HHC OT ASSISTANT EA 15: HCPCS

## 2024-04-24 PROCEDURE — G0157 HHC PT ASSISTANT EA 15: HCPCS

## 2024-04-25 ENCOUNTER — HOME CARE VISIT (OUTPATIENT)
Dept: SCHEDULING | Facility: HOME HEALTH | Age: 84
End: 2024-04-25
Payer: MEDICARE

## 2024-04-25 VITALS
RESPIRATION RATE: 16 BRPM | OXYGEN SATURATION: 97 % | HEART RATE: 82 BPM | SYSTOLIC BLOOD PRESSURE: 132 MMHG | TEMPERATURE: 98.3 F | DIASTOLIC BLOOD PRESSURE: 64 MMHG

## 2024-04-25 PROCEDURE — G0299 HHS/HOSPICE OF RN EA 15 MIN: HCPCS

## 2024-04-25 ASSESSMENT — ENCOUNTER SYMPTOMS
STOOL DESCRIPTION: SOFT
DIARRHEA: 1
DYSPNEA ACTIVITY LEVEL: AFTER AMBULATING MORE THAN 20 FT

## 2024-04-26 ENCOUNTER — HOME CARE VISIT (OUTPATIENT)
Dept: SCHEDULING | Facility: HOME HEALTH | Age: 84
End: 2024-04-26
Payer: MEDICARE

## 2024-04-26 ENCOUNTER — TELEMEDICINE (OUTPATIENT)
Age: 84
End: 2024-04-26
Payer: MEDICARE

## 2024-04-26 DIAGNOSIS — F41.9 ANXIETY: ICD-10-CM

## 2024-04-26 DIAGNOSIS — Z51.5 PALLIATIVE CARE ENCOUNTER: ICD-10-CM

## 2024-04-26 DIAGNOSIS — J47.9 BRONCHIECTASIS WITHOUT COMPLICATION (HCC): ICD-10-CM

## 2024-04-26 DIAGNOSIS — J96.11 CHRONIC RESPIRATORY FAILURE WITH HYPOXIA (HCC): ICD-10-CM

## 2024-04-26 DIAGNOSIS — R53.81 DEBILITY: ICD-10-CM

## 2024-04-26 PROCEDURE — 1036F TOBACCO NON-USER: CPT | Performed by: NURSE PRACTITIONER

## 2024-04-26 PROCEDURE — 99215 OFFICE O/P EST HI 40 MIN: CPT | Performed by: NURSE PRACTITIONER

## 2024-04-26 PROCEDURE — G8427 DOCREV CUR MEDS BY ELIG CLIN: HCPCS | Performed by: NURSE PRACTITIONER

## 2024-04-26 PROCEDURE — G8420 CALC BMI NORM PARAMETERS: HCPCS | Performed by: NURSE PRACTITIONER

## 2024-04-26 PROCEDURE — G8399 PT W/DXA RESULTS DOCUMENT: HCPCS | Performed by: NURSE PRACTITIONER

## 2024-04-26 PROCEDURE — 1123F ACP DISCUSS/DSCN MKR DOCD: CPT | Performed by: NURSE PRACTITIONER

## 2024-04-26 PROCEDURE — 1090F PRES/ABSN URINE INCON ASSESS: CPT | Performed by: NURSE PRACTITIONER

## 2024-04-26 RX ORDER — OMEPRAZOLE 40 MG/1
CAPSULE, DELAYED RELEASE ORAL
Qty: 90 CAPSULE | Refills: 3 | Status: SHIPPED | OUTPATIENT
Start: 2024-04-26

## 2024-04-26 RX ORDER — ROFLUMILAST 500 UG/1
TABLET ORAL
Qty: 90 TABLET | Refills: 3 | Status: SHIPPED | OUTPATIENT
Start: 2024-04-26

## 2024-04-26 RX ORDER — FLUTICASONE FUROATE, UMECLIDINIUM BROMIDE AND VILANTEROL TRIFENATATE 100; 62.5; 25 UG/1; UG/1; UG/1
POWDER RESPIRATORY (INHALATION)
Qty: 3 EACH | Refills: 3 | Status: SHIPPED | OUTPATIENT
Start: 2024-04-26

## 2024-04-26 RX ORDER — ALPRAZOLAM 0.5 MG/1
0.5 TABLET ORAL 2 TIMES DAILY
Qty: 60 TABLET | Refills: 3 | Status: SHIPPED | OUTPATIENT
Start: 2024-04-26 | End: 2024-08-24

## 2024-04-26 RX ORDER — MONTELUKAST SODIUM 10 MG/1
TABLET ORAL
Qty: 90 TABLET | Refills: 3 | Status: SHIPPED | OUTPATIENT
Start: 2024-04-26

## 2024-04-26 RX ORDER — LEVOTHYROXINE SODIUM 88 UG/1
88 TABLET ORAL DAILY
Qty: 90 TABLET | Refills: 3 | Status: SHIPPED | OUTPATIENT
Start: 2024-04-26

## 2024-04-26 NOTE — PROGRESS NOTES
compliance with the treatment plan as well as documenting on the day of the visit.    --Bridgett Moseley, APRN - CNP

## 2024-04-30 ENCOUNTER — HOME CARE VISIT (OUTPATIENT)
Dept: SCHEDULING | Facility: HOME HEALTH | Age: 84
End: 2024-04-30
Payer: MEDICARE

## 2024-04-30 ENCOUNTER — TELEPHONE (OUTPATIENT)
Dept: PULMONOLOGY | Age: 84
End: 2024-04-30

## 2024-04-30 DIAGNOSIS — Z51.5 PALLIATIVE CARE ENCOUNTER: ICD-10-CM

## 2024-04-30 DIAGNOSIS — F41.9 ANXIETY: ICD-10-CM

## 2024-04-30 PROCEDURE — G0152 HHCP-SERV OF OT,EA 15 MIN: HCPCS

## 2024-04-30 RX ORDER — OMEPRAZOLE 40 MG/1
40 CAPSULE, DELAYED RELEASE ORAL DAILY
Qty: 90 CAPSULE | Refills: 3 | Status: SHIPPED | OUTPATIENT
Start: 2024-04-30

## 2024-05-01 ENCOUNTER — HOME CARE VISIT (OUTPATIENT)
Dept: SCHEDULING | Facility: HOME HEALTH | Age: 84
End: 2024-05-01
Payer: MEDICARE

## 2024-05-01 VITALS
HEART RATE: 76 BPM | DIASTOLIC BLOOD PRESSURE: 80 MMHG | SYSTOLIC BLOOD PRESSURE: 156 MMHG | RESPIRATION RATE: 16 BRPM | OXYGEN SATURATION: 99 % | TEMPERATURE: 97.5 F

## 2024-05-01 VITALS
HEART RATE: 76 BPM | TEMPERATURE: 97.6 F | SYSTOLIC BLOOD PRESSURE: 144 MMHG | RESPIRATION RATE: 16 BRPM | DIASTOLIC BLOOD PRESSURE: 60 MMHG | OXYGEN SATURATION: 98 %

## 2024-05-01 PROCEDURE — G0157 HHC PT ASSISTANT EA 15: HCPCS

## 2024-05-01 ASSESSMENT — ENCOUNTER SYMPTOMS: PAIN LOCATION - PAIN QUALITY: EXCRUCIATING

## 2024-05-02 ENCOUNTER — HOME CARE VISIT (OUTPATIENT)
Dept: SCHEDULING | Facility: HOME HEALTH | Age: 84
End: 2024-05-02
Payer: MEDICARE

## 2024-05-02 PROCEDURE — G0299 HHS/HOSPICE OF RN EA 15 MIN: HCPCS

## 2024-05-03 ENCOUNTER — HOME CARE VISIT (OUTPATIENT)
Dept: SCHEDULING | Facility: HOME HEALTH | Age: 84
End: 2024-05-03
Payer: MEDICARE

## 2024-05-03 VITALS
OXYGEN SATURATION: 97 % | TEMPERATURE: 99 F | SYSTOLIC BLOOD PRESSURE: 118 MMHG | HEART RATE: 92 BPM | DIASTOLIC BLOOD PRESSURE: 64 MMHG | RESPIRATION RATE: 16 BRPM

## 2024-05-03 VITALS
TEMPERATURE: 98 F | OXYGEN SATURATION: 96 % | RESPIRATION RATE: 16 BRPM | DIASTOLIC BLOOD PRESSURE: 74 MMHG | SYSTOLIC BLOOD PRESSURE: 162 MMHG | HEART RATE: 92 BPM

## 2024-05-03 PROCEDURE — G0157 HHC PT ASSISTANT EA 15: HCPCS

## 2024-05-03 ASSESSMENT — ENCOUNTER SYMPTOMS
PAIN LOCATION - PAIN QUALITY: THROBBING
DYSPNEA ACTIVITY LEVEL: AFTER AMBULATING LESS THAN 10 FT
PAIN LOCATION - PAIN QUALITY: ACHE
DIARRHEA: 1
STOOL DESCRIPTION: LOOSE

## 2024-05-08 ENCOUNTER — HOME CARE VISIT (OUTPATIENT)
Dept: HOME HEALTH SERVICES | Facility: HOME HEALTH | Age: 84
End: 2024-05-08
Payer: MEDICARE

## 2024-05-08 ENCOUNTER — HOME CARE VISIT (OUTPATIENT)
Dept: SCHEDULING | Facility: HOME HEALTH | Age: 84
End: 2024-05-08
Payer: MEDICARE

## 2024-05-08 VITALS
SYSTOLIC BLOOD PRESSURE: 138 MMHG | HEART RATE: 76 BPM | OXYGEN SATURATION: 96 % | DIASTOLIC BLOOD PRESSURE: 62 MMHG | TEMPERATURE: 98.4 F | RESPIRATION RATE: 16 BRPM

## 2024-05-08 PROCEDURE — G0157 HHC PT ASSISTANT EA 15: HCPCS

## 2024-05-08 ASSESSMENT — ENCOUNTER SYMPTOMS: PAIN LOCATION - PAIN QUALITY: THROBBING

## 2024-05-09 ENCOUNTER — HOME CARE VISIT (OUTPATIENT)
Dept: SCHEDULING | Facility: HOME HEALTH | Age: 84
End: 2024-05-09
Payer: MEDICARE

## 2024-05-09 VITALS
SYSTOLIC BLOOD PRESSURE: 108 MMHG | HEART RATE: 84 BPM | TEMPERATURE: 98.8 F | RESPIRATION RATE: 16 BRPM | OXYGEN SATURATION: 92 % | DIASTOLIC BLOOD PRESSURE: 58 MMHG

## 2024-05-09 PROCEDURE — G0152 HHCP-SERV OF OT,EA 15 MIN: HCPCS

## 2024-05-09 PROCEDURE — G0299 HHS/HOSPICE OF RN EA 15 MIN: HCPCS

## 2024-05-09 ASSESSMENT — ENCOUNTER SYMPTOMS
STOOL DESCRIPTION: FORMED
DYSPNEA ACTIVITY LEVEL: AFTER AMBULATING MORE THAN 20 FT
HEMOPTYSIS: 0
DIARRHEA: 1

## 2024-05-10 ENCOUNTER — HOME CARE VISIT (OUTPATIENT)
Dept: SCHEDULING | Facility: HOME HEALTH | Age: 84
End: 2024-05-10
Payer: MEDICARE

## 2024-05-10 VITALS
OXYGEN SATURATION: 98 % | RESPIRATION RATE: 16 BRPM | SYSTOLIC BLOOD PRESSURE: 148 MMHG | TEMPERATURE: 98 F | HEART RATE: 81 BPM | DIASTOLIC BLOOD PRESSURE: 76 MMHG

## 2024-05-10 VITALS
OXYGEN SATURATION: 98 % | SYSTOLIC BLOOD PRESSURE: 152 MMHG | HEART RATE: 76 BPM | DIASTOLIC BLOOD PRESSURE: 60 MMHG | RESPIRATION RATE: 18 BRPM | TEMPERATURE: 98.6 F

## 2024-05-10 PROCEDURE — G0151 HHCP-SERV OF PT,EA 15 MIN: HCPCS

## 2024-05-10 ASSESSMENT — ENCOUNTER SYMPTOMS
DYSPNEA ACTIVITY LEVEL: AFTER AMBULATING MORE THAN 20 FT
DYSPNEA ACTIVITY LEVEL: AFTER AMBULATING MORE THAN 20 FT
SPUTUM AMOUNT: SCANT
SPUTUM COLOR: WHITE
COUGH CHARACTERISTICS: PRODUCTIVE
CONTUSION: 1
PAIN LOCATION - PAIN QUALITY: ACHE
SPUTUM CONSISTENCY: THICK
SPUTUM PRODUCTION: 1
COUGH: 1

## 2024-05-12 NOTE — PROGRESS NOTES
disturbance.   Respiratory:  Negative for cough, shortness of breath and wheezing.    Cardiovascular:  Negative for chest pain, palpitations and leg swelling.   Gastrointestinal:  Negative for abdominal distention, abdominal pain, constipation, diarrhea, nausea and vomiting.   Endocrine: Negative for cold intolerance and heat intolerance.   Genitourinary:  Negative for dysuria, frequency and hematuria.   Musculoskeletal:  Negative for arthralgias, joint swelling and myalgias.   Skin:  Negative for rash and wound.   Allergic/Immunologic: Negative for environmental allergies.   Neurological:  Negative for dizziness, seizures, syncope and light-headedness.   Hematological:  Negative for adenopathy. Does not bruise/bleed easily.   Psychiatric/Behavioral:  Negative for agitation, behavioral problems, confusion and hallucinations.          PHYSICAL EXAM:  Vitals:    05/16/24 1407   BP: (!) 124/50   Pulse: 90   Weight: 53.1 kg (117 lb)   Height: 1.6 m (5' 3\")      Wt Readings from Last 3 Encounters:   05/16/24 53.1 kg (117 lb)   04/16/24 51.8 kg (114 lb 4 oz)   03/22/24 57.8 kg (127 lb 6 oz)      BP Readings from Last 3 Encounters:   05/16/24 (!) 124/50   05/10/24 (!) 148/76   05/09/24 (!) 108/58        Physical Exam  Constitutional:       Appearance: Normal appearance.   HENT:      Head: Normocephalic and atraumatic.      Nose: Nose normal.      Mouth/Throat:      Mouth: Mucous membranes are moist.      Pharynx: Oropharynx is clear.   Eyes:      Extraocular Movements: Extraocular movements intact.      Pupils: Pupils are equal, round, and reactive to light.   Neck:      Vascular: Carotid bruit (soft left bruit) present. No JVD.   Cardiovascular:      Rate and Rhythm: Normal rate and regular rhythm.      Heart sounds: No murmur heard.     No friction rub. No gallop.   Pulmonary:      Effort: Pulmonary effort is normal.      Breath sounds: Normal breath sounds. No wheezing or rhonchi.   Abdominal:      General: Abdomen is

## 2024-05-16 ENCOUNTER — OFFICE VISIT (OUTPATIENT)
Age: 84
End: 2024-05-16
Payer: MEDICARE

## 2024-05-16 VITALS
BODY MASS INDEX: 20.73 KG/M2 | DIASTOLIC BLOOD PRESSURE: 50 MMHG | HEIGHT: 63 IN | HEART RATE: 90 BPM | WEIGHT: 117 LBS | SYSTOLIC BLOOD PRESSURE: 124 MMHG

## 2024-05-16 DIAGNOSIS — I25.10 CORONARY ARTERY DISEASE INVOLVING NATIVE CORONARY ARTERY OF NATIVE HEART WITHOUT ANGINA PECTORIS: Primary | ICD-10-CM

## 2024-05-16 DIAGNOSIS — R60.0 BILATERAL LEG EDEMA: ICD-10-CM

## 2024-05-16 DIAGNOSIS — I65.21 CAROTID STENOSIS, RIGHT: ICD-10-CM

## 2024-05-16 DIAGNOSIS — E78.2 MIXED HYPERLIPIDEMIA: ICD-10-CM

## 2024-05-16 DIAGNOSIS — G47.33 OSA ON CPAP: ICD-10-CM

## 2024-05-16 DIAGNOSIS — I73.9 PVD (PERIPHERAL VASCULAR DISEASE) (HCC): ICD-10-CM

## 2024-05-16 DIAGNOSIS — I27.20 PULMONARY HYPERTENSION (HCC): ICD-10-CM

## 2024-05-16 DIAGNOSIS — I10 PRIMARY HYPERTENSION: ICD-10-CM

## 2024-05-16 DIAGNOSIS — J43.2 CENTRILOBULAR EMPHYSEMA (HCC): Chronic | ICD-10-CM

## 2024-05-16 PROCEDURE — 1123F ACP DISCUSS/DSCN MKR DOCD: CPT | Performed by: INTERNAL MEDICINE

## 2024-05-16 PROCEDURE — 3078F DIAST BP <80 MM HG: CPT | Performed by: INTERNAL MEDICINE

## 2024-05-16 PROCEDURE — G8399 PT W/DXA RESULTS DOCUMENT: HCPCS | Performed by: INTERNAL MEDICINE

## 2024-05-16 PROCEDURE — G8420 CALC BMI NORM PARAMETERS: HCPCS | Performed by: INTERNAL MEDICINE

## 2024-05-16 PROCEDURE — 3023F SPIROM DOC REV: CPT | Performed by: INTERNAL MEDICINE

## 2024-05-16 PROCEDURE — 3074F SYST BP LT 130 MM HG: CPT | Performed by: INTERNAL MEDICINE

## 2024-05-16 PROCEDURE — G8427 DOCREV CUR MEDS BY ELIG CLIN: HCPCS | Performed by: INTERNAL MEDICINE

## 2024-05-16 PROCEDURE — 99214 OFFICE O/P EST MOD 30 MIN: CPT | Performed by: INTERNAL MEDICINE

## 2024-05-16 PROCEDURE — 1036F TOBACCO NON-USER: CPT | Performed by: INTERNAL MEDICINE

## 2024-05-16 PROCEDURE — 1090F PRES/ABSN URINE INCON ASSESS: CPT | Performed by: INTERNAL MEDICINE

## 2024-05-16 RX ORDER — METOPROLOL SUCCINATE 50 MG/1
50 TABLET, EXTENDED RELEASE ORAL DAILY
Qty: 90 TABLET | Refills: 3 | Status: SHIPPED | OUTPATIENT
Start: 2024-05-16

## 2024-05-21 ENCOUNTER — OFFICE VISIT (OUTPATIENT)
Age: 84
End: 2024-05-21
Payer: MEDICARE

## 2024-05-21 VITALS
TEMPERATURE: 97.9 F | DIASTOLIC BLOOD PRESSURE: 74 MMHG | RESPIRATION RATE: 14 BRPM | HEART RATE: 83 BPM | SYSTOLIC BLOOD PRESSURE: 131 MMHG | WEIGHT: 116 LBS | BODY MASS INDEX: 20.55 KG/M2 | OXYGEN SATURATION: 97 % | HEIGHT: 63 IN

## 2024-05-21 DIAGNOSIS — J47.9 BRONCHIECTASIS WITHOUT COMPLICATION (HCC): ICD-10-CM

## 2024-05-21 DIAGNOSIS — J96.11 CHRONIC RESPIRATORY FAILURE WITH HYPOXIA (HCC): Primary | ICD-10-CM

## 2024-05-21 DIAGNOSIS — G89.29 OTHER CHRONIC PAIN: ICD-10-CM

## 2024-05-21 DIAGNOSIS — R53.81 DEBILITY: ICD-10-CM

## 2024-05-21 DIAGNOSIS — Z51.5 PALLIATIVE CARE PATIENT: ICD-10-CM

## 2024-05-21 DIAGNOSIS — G62.9 NEUROPATHY: ICD-10-CM

## 2024-05-21 PROCEDURE — 1090F PRES/ABSN URINE INCON ASSESS: CPT | Performed by: NURSE PRACTITIONER

## 2024-05-21 PROCEDURE — G8427 DOCREV CUR MEDS BY ELIG CLIN: HCPCS | Performed by: NURSE PRACTITIONER

## 2024-05-21 PROCEDURE — 99214 OFFICE O/P EST MOD 30 MIN: CPT | Performed by: NURSE PRACTITIONER

## 2024-05-21 PROCEDURE — 3075F SYST BP GE 130 - 139MM HG: CPT | Performed by: NURSE PRACTITIONER

## 2024-05-21 PROCEDURE — 3078F DIAST BP <80 MM HG: CPT | Performed by: NURSE PRACTITIONER

## 2024-05-21 PROCEDURE — G8420 CALC BMI NORM PARAMETERS: HCPCS | Performed by: NURSE PRACTITIONER

## 2024-05-21 PROCEDURE — G8399 PT W/DXA RESULTS DOCUMENT: HCPCS | Performed by: NURSE PRACTITIONER

## 2024-05-21 PROCEDURE — 1036F TOBACCO NON-USER: CPT | Performed by: NURSE PRACTITIONER

## 2024-05-21 PROCEDURE — 1123F ACP DISCUSS/DSCN MKR DOCD: CPT | Performed by: NURSE PRACTITIONER

## 2024-05-21 RX ORDER — TRAMADOL HYDROCHLORIDE 50 MG/1
50 TABLET ORAL EVERY 6 HOURS PRN
Qty: 60 TABLET | Refills: 2 | Status: SHIPPED | OUTPATIENT
Start: 2024-05-21 | End: 2024-07-28

## 2024-05-21 NOTE — PROGRESS NOTES
Dae Correa (:  1940) is a 84 y.o. female,Established patient, here for evaluation of the following chief complaint(s):  Breathing Problem and Family Problem      Assessment & Plan   1. Chronic respiratory failure with hypoxia (HCC)  No major changes noted since the last discussion. Continues to live with her daughter/son-in-law and wishes there was some alterative, such as assisted living. She does not have a skilled need to be placed in a nursing home. Funding limits prevent her for accessing assisted living. There is no evidence of neglect or abuse. In fact, the family has done a tremendous amount of work to help with this patient. No clear association of these behaviors to any of her medicines, as long as she is taking them.     2. Debility  Long and emotional discussion about her overall perspective about her inability to live alone. The family has had a falling out and there seems to be quite a bit of hostility and disruptive circumstances.     3. Bronchiectasis without complication (HCC)  No specific changes noted in the pulmonary condition.    4. Neuropathy  Currently using gabapentin for neuropathy pain. Encouraged to use this medication for control.     5. Other chronic pain  RX sent for Tramadol dosing. Possible association of pain to her behaviors (?) so will be a bit more assertive with management.     6. Palliative care patient  Ongoing issues with communication between the family.       Return for 6-8 weeks IN OFFICE.       Subjective   Ms. Correa is a pleasant 83-year-old female who's birthday is today and being seen virtually by palliative care for ongoing support. She has known COPD, bronchiectasis, prior tobacco use, depression, anxiety, chronic pain syndrome, and debility. She seems somewhat agitated today and was very sharp with her family.       Review of Systems   Constitutional:  Positive for activity change.        Less mobile due to swelling in lower extremities. More

## 2024-05-28 ENCOUNTER — TELEPHONE (OUTPATIENT)
Age: 84
End: 2024-05-28
Payer: MEDICARE

## 2024-05-28 DIAGNOSIS — Z51.5 PALLIATIVE CARE PATIENT: ICD-10-CM

## 2024-05-28 DIAGNOSIS — I48.0 PAROXYSMAL ATRIAL FIBRILLATION (HCC): Primary | ICD-10-CM

## 2024-05-28 DIAGNOSIS — R31.9 HEMATURIA, UNSPECIFIED TYPE: ICD-10-CM

## 2024-05-28 DIAGNOSIS — I25.10 CORONARY ARTERY DISEASE INVOLVING NATIVE CORONARY ARTERY OF NATIVE HEART WITHOUT ANGINA PECTORIS: ICD-10-CM

## 2024-05-28 PROCEDURE — 99441 PR PHYS/QHP TELEPHONE EVALUATION 5-10 MIN: CPT | Performed by: INTERNAL MEDICINE

## 2024-05-28 NOTE — TELEPHONE ENCOUNTER
Pt daughter called to discuss with Dr Randolph if pt can be taken off of eliquis - recommended to do so by palliative care. In the last month pt has been experiencing blood in urine and bowel movements as well as in increase in nose bleeds. Please call and advise

## 2024-05-28 NOTE — TELEPHONE ENCOUNTER
Triage informed Amparo of Dr. Randolph' response. She verbalizes understanding and agrees to plan.

## 2024-05-28 NOTE — TELEPHONE ENCOUNTER
Patient initiated outside phone call received.  Time spent reviewing chart, old records, formulating a plan, and responding is greater than 5 minutes.    The patient's most recent encounter has been reviewed.  Medications and appropriate lab work have been reviewed.  Appropriate imaging studies were reviewed.     Formulated plan and response:  If having recurrent bleeding in urine and bowels, okay to stop Eliquis.  No recent palpitations or tachycardia noted on recent office visit.  If A-fib is diagnosed in the future we will need to decide on options of resuming oral anticoagulation or treating conservatively, however at this time go ahead and stop the Eliquis.  If bleeding continues needs to seek further assistance from primary care physicians or specialists.  Keep routine follow-up with me.    ZANDER Randolph MD

## 2024-06-25 ENCOUNTER — TELEPHONE (OUTPATIENT)
Age: 84
End: 2024-06-25

## 2024-06-25 NOTE — PROGRESS NOTES
wheezing, cough/chest congestion and mucous clearance     Dispense:  1080 mL     Refill:  3     Please call patient before dispensing to determine if she needs refill    Roflumilast (DALIRESP) 500 MCG tablet     Si po daily.  May decrease to every other day if necessary due to side effects     Dispense:  90 tablet     Refill:  3    sodium chloride, Inhalant, 3 % nebulizer solution     Sig: Take 4 mLs by nebulization as needed for Cough (chest congestion and mucous clearance;  bronchiectasis) If this patient is Medicare please file under Medicare Part B DX: COPD exacerbation (HCC) [J44.1]     Dispense:  240 mL     Refill:  3    montelukast (SINGULAIR) 10 MG tablet     Si po q hs     Dispense:  90 tablet     Refill:  3           MARIAA PASCUAL - ARUN    Total  time spent with patient -  min.     Collaborating MD:        HISTORY OF PRESENT ILLNESS:    She is a very pleasant 84-year-old female w/ hx of COPD, bronchiectasis, upper lobe predominant emphysema, nocturnal hypoxia and prior tobacco use who is seen today in routine follow up.  She has also been followed by palliative care.     Seen in ER 2024 secondary to new onset A-fib.  She was begun on Eliquis, and begun on Cardizem.    Today, she is accompanied by her daughter who assists with therapy.  She reports interval decrease in shortness of breath, cough.  Denies wheezing, purulent sputum or hemoptysis.  No fever, chills.    She is compliant with Trelegy, flutter valve and uses albuterol inhaler or nebulizer intermittently with good response.  She uses nebulized saline periodically.  She reports that she has been able to clear secretions better.  She is compliant with oxygen with sleep.        DIAGNOSTICS:       CPFT's 2015-flow volume loop reveals airway obstruction, no significant bronchodilator response, lung volumes were low normal.  Diffusion capacity was decreased, study is consistent with COPD.  Spirometry 2017-moderate obstructive

## 2024-06-25 NOTE — TELEPHONE ENCOUNTER
[facility] called regarding appt for patient; faxing over updated lab results from stool sample.    Requested appt for patient.  Asked that we contact daughter to schedule..

## 2024-06-25 NOTE — TELEPHONE ENCOUNTER
Called and LVM on daughter's vm; giving possible appt time on 6/26 at 3:45.    Asked them to return call to schedule.

## 2024-06-26 ENCOUNTER — OFFICE VISIT (OUTPATIENT)
Dept: PULMONOLOGY | Age: 84
End: 2024-06-26
Payer: MEDICARE

## 2024-06-26 ENCOUNTER — TELEPHONE (OUTPATIENT)
Age: 84
End: 2024-06-26

## 2024-06-26 VITALS
TEMPERATURE: 97.3 F | HEART RATE: 74 BPM | WEIGHT: 111 LBS | HEIGHT: 63 IN | SYSTOLIC BLOOD PRESSURE: 123 MMHG | OXYGEN SATURATION: 99 % | DIASTOLIC BLOOD PRESSURE: 60 MMHG | RESPIRATION RATE: 18 BRPM | BODY MASS INDEX: 19.67 KG/M2

## 2024-06-26 DIAGNOSIS — J47.9 BRONCHIECTASIS WITHOUT COMPLICATION (HCC): ICD-10-CM

## 2024-06-26 DIAGNOSIS — G47.34 NOCTURNAL HYPOXIA: ICD-10-CM

## 2024-06-26 DIAGNOSIS — F17.211 CIGARETTE NICOTINE DEPENDENCE IN REMISSION: ICD-10-CM

## 2024-06-26 DIAGNOSIS — J43.2 CENTRILOBULAR EMPHYSEMA (HCC): ICD-10-CM

## 2024-06-26 DIAGNOSIS — J44.9 MODERATE COPD (CHRONIC OBSTRUCTIVE PULMONARY DISEASE) (HCC): Primary | ICD-10-CM

## 2024-06-26 PROCEDURE — 1090F PRES/ABSN URINE INCON ASSESS: CPT | Performed by: NURSE PRACTITIONER

## 2024-06-26 PROCEDURE — 99214 OFFICE O/P EST MOD 30 MIN: CPT | Performed by: NURSE PRACTITIONER

## 2024-06-26 PROCEDURE — 1036F TOBACCO NON-USER: CPT | Performed by: NURSE PRACTITIONER

## 2024-06-26 PROCEDURE — 3074F SYST BP LT 130 MM HG: CPT | Performed by: NURSE PRACTITIONER

## 2024-06-26 PROCEDURE — 3078F DIAST BP <80 MM HG: CPT | Performed by: NURSE PRACTITIONER

## 2024-06-26 PROCEDURE — 1123F ACP DISCUSS/DSCN MKR DOCD: CPT | Performed by: NURSE PRACTITIONER

## 2024-06-26 PROCEDURE — G8420 CALC BMI NORM PARAMETERS: HCPCS | Performed by: NURSE PRACTITIONER

## 2024-06-26 PROCEDURE — G8427 DOCREV CUR MEDS BY ELIG CLIN: HCPCS | Performed by: NURSE PRACTITIONER

## 2024-06-26 PROCEDURE — G8399 PT W/DXA RESULTS DOCUMENT: HCPCS | Performed by: NURSE PRACTITIONER

## 2024-06-26 PROCEDURE — 3023F SPIROM DOC REV: CPT | Performed by: NURSE PRACTITIONER

## 2024-06-26 RX ORDER — ROFLUMILAST 500 UG/1
TABLET ORAL
Qty: 90 TABLET | Refills: 3 | Status: SHIPPED | OUTPATIENT
Start: 2024-06-26

## 2024-06-26 RX ORDER — MONTELUKAST SODIUM 10 MG/1
TABLET ORAL
Qty: 90 TABLET | Refills: 3 | Status: SHIPPED | OUTPATIENT
Start: 2024-06-26

## 2024-06-26 RX ORDER — MONTELUKAST SODIUM 10 MG/1
TABLET ORAL
COMMUNITY
Start: 2024-06-01 | End: 2024-06-26 | Stop reason: SDUPTHER

## 2024-06-26 RX ORDER — ALBUTEROL SULFATE 2.5 MG/3ML
SOLUTION RESPIRATORY (INHALATION)
Qty: 1080 ML | Refills: 3 | Status: SHIPPED | OUTPATIENT
Start: 2024-06-26

## 2024-06-26 RX ORDER — SODIUM CHLORIDE FOR INHALATION 3 %
4 VIAL, NEBULIZER (ML) INHALATION PRN
Qty: 240 ML | Refills: 3 | Status: SHIPPED | OUTPATIENT
Start: 2024-06-26 | End: 2024-06-26 | Stop reason: SDUPTHER

## 2024-06-26 RX ORDER — SODIUM CHLORIDE FOR INHALATION 3 %
4 VIAL, NEBULIZER (ML) INHALATION 2 TIMES DAILY PRN
Qty: 240 ML | Refills: 3 | Status: SHIPPED | OUTPATIENT
Start: 2024-06-26

## 2024-06-26 ASSESSMENT — ENCOUNTER SYMPTOMS
SPUTUM PRODUCTION: 0
WHEEZING: 0
COUGH: 1
SHORTNESS OF BREATH: 0

## 2024-06-26 NOTE — TELEPHONE ENCOUNTER
Called the patient and left VM, as advised by Ms. Damari Birmingham NP, she might need to contact her PCP regarding her inquiry since PCP might need to place referral to appropriate provider in order to be tested for psoriasis.  Left info to call me back if any questions on message Nitza Tripp, [address] 

## 2024-06-26 NOTE — PATIENT INSTRUCTIONS
May use albuterol via nebulizer up to 4 times daily.     Use flutter valve following nebulizer treatments.    OTC mucolytic, (mucinex or generic equivalent of guaifenesin), 2400mg in 24 hours in divided doses as needed to thin mucous.     Continue Trelegy 1 inhalation every morning.     Continue Daliresp.     Continue oxygen with sleep.    Recommend newest COVID booster, RSV vaccine.  Flu vaccine in the fall.  She is up-to-date on pneumonia vaccines.

## 2024-07-15 ENCOUNTER — TELEPHONE (OUTPATIENT)
Dept: PULMONOLOGY | Age: 84
End: 2024-07-15

## 2024-07-15 NOTE — TELEPHONE ENCOUNTER
Patient's daughter is calling concerening appointment with Reta on 7/17.  Asking if patient can do VV instead of coming in

## 2024-07-15 NOTE — TELEPHONE ENCOUNTER
Call daughter back and informed her she needed to come in for her appointment. She verbalized understanding and no further questions or concerns at this time.

## 2024-07-16 ENCOUNTER — TELEPHONE (OUTPATIENT)
Dept: ORTHOPEDIC SURGERY | Age: 84
End: 2024-07-16

## 2024-07-17 ENCOUNTER — OFFICE VISIT (OUTPATIENT)
Age: 84
End: 2024-07-17
Payer: MEDICARE

## 2024-07-17 VITALS
DIASTOLIC BLOOD PRESSURE: 68 MMHG | HEIGHT: 63 IN | BODY MASS INDEX: 21.17 KG/M2 | HEART RATE: 78 BPM | WEIGHT: 119.5 LBS | RESPIRATION RATE: 18 BRPM | TEMPERATURE: 97.8 F | SYSTOLIC BLOOD PRESSURE: 128 MMHG | OXYGEN SATURATION: 98 %

## 2024-07-17 DIAGNOSIS — Z51.5 PALLIATIVE CARE ENCOUNTER: ICD-10-CM

## 2024-07-17 DIAGNOSIS — J47.9 BRONCHIECTASIS WITHOUT COMPLICATION (HCC): ICD-10-CM

## 2024-07-17 DIAGNOSIS — Z51.5 PALLIATIVE CARE PATIENT: ICD-10-CM

## 2024-07-17 DIAGNOSIS — F41.9 ANXIETY: ICD-10-CM

## 2024-07-17 DIAGNOSIS — G62.9 NEUROPATHY: ICD-10-CM

## 2024-07-17 DIAGNOSIS — J43.2 CENTRILOBULAR EMPHYSEMA (HCC): ICD-10-CM

## 2024-07-17 DIAGNOSIS — R53.81 DEBILITY: ICD-10-CM

## 2024-07-17 DIAGNOSIS — J96.11 CHRONIC RESPIRATORY FAILURE WITH HYPOXIA (HCC): ICD-10-CM

## 2024-07-17 DIAGNOSIS — J44.9 MODERATE COPD (CHRONIC OBSTRUCTIVE PULMONARY DISEASE) (HCC): ICD-10-CM

## 2024-07-17 PROCEDURE — G8399 PT W/DXA RESULTS DOCUMENT: HCPCS | Performed by: NURSE PRACTITIONER

## 2024-07-17 PROCEDURE — 1090F PRES/ABSN URINE INCON ASSESS: CPT | Performed by: NURSE PRACTITIONER

## 2024-07-17 PROCEDURE — G8427 DOCREV CUR MEDS BY ELIG CLIN: HCPCS | Performed by: NURSE PRACTITIONER

## 2024-07-17 PROCEDURE — 1123F ACP DISCUSS/DSCN MKR DOCD: CPT | Performed by: NURSE PRACTITIONER

## 2024-07-17 PROCEDURE — G8420 CALC BMI NORM PARAMETERS: HCPCS | Performed by: NURSE PRACTITIONER

## 2024-07-17 PROCEDURE — 1036F TOBACCO NON-USER: CPT | Performed by: NURSE PRACTITIONER

## 2024-07-17 PROCEDURE — 99214 OFFICE O/P EST MOD 30 MIN: CPT | Performed by: NURSE PRACTITIONER

## 2024-07-17 PROCEDURE — 3023F SPIROM DOC REV: CPT | Performed by: NURSE PRACTITIONER

## 2024-07-17 PROCEDURE — 3074F SYST BP LT 130 MM HG: CPT | Performed by: NURSE PRACTITIONER

## 2024-07-17 PROCEDURE — 3078F DIAST BP <80 MM HG: CPT | Performed by: NURSE PRACTITIONER

## 2024-07-17 RX ORDER — LEVOTHYROXINE SODIUM 88 UG/1
88 TABLET ORAL DAILY
Qty: 90 TABLET | Refills: 3 | Status: SHIPPED | OUTPATIENT
Start: 2024-07-17

## 2024-07-17 RX ORDER — ALBUTEROL SULFATE 2.5 MG/3ML
SOLUTION RESPIRATORY (INHALATION)
Qty: 1080 ML | Refills: 3 | Status: SHIPPED | OUTPATIENT
Start: 2024-07-17

## 2024-07-17 RX ORDER — ROFLUMILAST 500 UG/1
TABLET ORAL
Qty: 90 TABLET | Refills: 3 | Status: SHIPPED | OUTPATIENT
Start: 2024-07-17

## 2024-07-17 RX ORDER — DULOXETIN HYDROCHLORIDE 60 MG/1
120 CAPSULE, DELAYED RELEASE ORAL DAILY
Qty: 90 CAPSULE | Refills: 3 | Status: SHIPPED | OUTPATIENT
Start: 2024-07-17

## 2024-07-17 RX ORDER — ALPRAZOLAM 0.5 MG/1
0.5 TABLET ORAL 2 TIMES DAILY
Qty: 60 TABLET | Refills: 3 | Status: SHIPPED | OUTPATIENT
Start: 2024-07-17 | End: 2024-11-14

## 2024-07-17 RX ORDER — FLUTICASONE FUROATE, UMECLIDINIUM BROMIDE AND VILANTEROL TRIFENATATE 100; 62.5; 25 UG/1; UG/1; UG/1
POWDER RESPIRATORY (INHALATION)
Qty: 3 EACH | Refills: 3 | Status: SHIPPED | OUTPATIENT
Start: 2024-07-17

## 2024-07-17 RX ORDER — PREGABALIN 50 MG/1
CAPSULE ORAL
Qty: 90 CAPSULE | Refills: 4 | Status: SHIPPED | OUTPATIENT
Start: 2024-07-17 | End: 2024-08-19

## 2024-07-17 RX ORDER — PREGABALIN 50 MG/1
CAPSULE ORAL
COMMUNITY
Start: 2024-06-27 | End: 2024-07-17 | Stop reason: SDUPTHER

## 2024-07-17 RX ORDER — MONTELUKAST SODIUM 10 MG/1
TABLET ORAL
Qty: 90 TABLET | Refills: 3 | Status: SHIPPED | OUTPATIENT
Start: 2024-07-17

## 2024-07-17 NOTE — PROGRESS NOTES
Dae Correa (:  1940) is a 84 y.o. female,Established patient, here for evaluation of the following chief complaint(s):  Chronic respiratory failure with hypoxia (HCC)      Assessment & Plan   1. Neuropathy  -     pregabalin (LYRICA) 50 MG capsule; TAKE 1 CAPSULE BY MOUTH TWICE DAILY AS DIRECTED FOR PAIN, Disp-90 capsule, R-4Normal    2. Chronic respiratory failure with hypoxia (HCC)  -     albuterol (PROVENTIL) (2.5 MG/3ML) 0.083% nebulizer solution; 3 ml via nebulizer qid prn shortness of breath, wheezing, cough/chest congestion and mucous clearance, Disp-1080 mL, R-3Please call patient before dispensing to determine if she needs refillNormal  -     fluticasone-umeclidin-vilant (TRELEGY ELLIPTA) 100-62.5-25 MCG/ACT AEPB inhaler; 1 inhalation every morning, rinse mouth after use, Disp-3 each, R-3Normal  -     montelukast (SINGULAIR) 10 MG tablet; 1 po q hs, Disp-90 tablet, R-3Normal  -     Roflumilast (DALIRESP) 500 MCG tablet; 1 po daily.  May decrease to every other day if necessary due to side effects, Disp-90 tablet, R-3Normal    3. Debility - No major changes noted since the last discussion. Continues to live with her daughter/son-in-law. Things seem to have settled down a bit.     4. Anxiety  -     ALPRAZolam (XANAX) 0.5 MG tablet; Take 1 tablet by mouth 2 times daily for 120 days. Max Daily Amount: 1 mg, Disp-60 tablet, R-3Normal    5. Palliative care patient  -     albuterol (PROVENTIL) (2.5 MG/3ML) 0.083% nebulizer solution; 3 ml via nebulizer qid prn shortness of breath, wheezing, cough/chest congestion and mucous clearance, Disp-1080 mL, R-3Please call patient before dispensing to determine if she needs refillNormal  -     ALPRAZolam (XANAX) 0.5 MG tablet; Take 1 tablet by mouth 2 times daily for 120 days. Max Daily Amount: 1 mg, Disp-60 tablet, R-3Normal  -     levothyroxine (SYNTHROID) 88 MCG tablet; Take 1 tablet by mouth Daily, Disp-90 tablet, R-3Normal  -     DULoxetine (CYMBALTA)

## 2024-07-19 ENCOUNTER — OFFICE VISIT (OUTPATIENT)
Dept: ORTHOPEDIC SURGERY | Age: 84
End: 2024-07-19

## 2024-07-19 DIAGNOSIS — M79.10 MYALGIA: ICD-10-CM

## 2024-07-19 DIAGNOSIS — M54.2 CERVICAL SPINE PAIN: Primary | ICD-10-CM

## 2024-07-19 RX ORDER — TRIAMCINOLONE ACETONIDE 40 MG/ML
160 INJECTION, SUSPENSION INTRA-ARTICULAR; INTRAMUSCULAR ONCE
Status: COMPLETED | OUTPATIENT
Start: 2024-07-19 | End: 2024-07-19

## 2024-07-19 RX ADMIN — TRIAMCINOLONE ACETONIDE 160 MG: 40 INJECTION, SUSPENSION INTRA-ARTICULAR; INTRAMUSCULAR at 10:57

## 2024-07-19 NOTE — PROGRESS NOTES
Date: 07/19/24   Name: Dae Correa    Pre-Procedural Diagnosis:    Diagnosis Orders   1. Cervical spine pain  INJECT TRIGGER POINT, 1 OR 2      2. Myalgia  INJECT TRIGGER POINT, 1 OR 2          CERVICAL TRIGGER POINT INJECTION(S)    Patient presents for repeat lumbar trigger point injections.  Had a nice response to injections 5 months ago. Brief exam is consistent with above impression.  Will repeat injections today.    Trigger Point Injection(s):  After informed oral consent, patient was prepped per routine. The bilateral upper and lower cervical paraspinal areas were injected. 40 mg of Kenalog with 1 cc of 0.25% Marcaine were injected at each site. Patient tolerated well. Band aids applied.     A total of four muscles/sites injected today for trigger point charge.    A total of 2 cc of Kenalog were administered during this procedure.    DEMETRIO LOPEZ JR, MD

## 2024-08-27 ENCOUNTER — TELEPHONE (OUTPATIENT)
Age: 84
End: 2024-08-27

## 2024-08-27 NOTE — TELEPHONE ENCOUNTER
Spoke w/pt to offer sooner appt for tomorrow 8:15 am.  Patient did not understand. Asked me to contact dtr. Then asked me not to contact dtr.   Appt did not change.

## 2024-09-02 DIAGNOSIS — J96.11 CHRONIC RESPIRATORY FAILURE WITH HYPOXIA: ICD-10-CM

## 2024-09-02 DIAGNOSIS — J47.9 BRONCHIECTASIS WITHOUT COMPLICATION (HCC): ICD-10-CM

## 2024-09-04 RX ORDER — ROFLUMILAST 500 UG/1
TABLET ORAL
Qty: 90 TABLET | Refills: 3 | OUTPATIENT
Start: 2024-09-04

## 2024-09-10 DIAGNOSIS — J47.9 BRONCHIECTASIS WITHOUT COMPLICATION (HCC): ICD-10-CM

## 2024-09-10 DIAGNOSIS — J96.11 CHRONIC RESPIRATORY FAILURE WITH HYPOXIA (HCC): ICD-10-CM

## 2024-09-11 RX ORDER — FLUTICASONE FUROATE, UMECLIDINIUM BROMIDE AND VILANTEROL TRIFENATATE 100; 62.5; 25 UG/1; UG/1; UG/1
POWDER RESPIRATORY (INHALATION)
Qty: 3 EACH | Refills: 3 | Status: SHIPPED | OUTPATIENT
Start: 2024-09-11

## 2024-11-11 ENCOUNTER — OFFICE VISIT (OUTPATIENT)
Age: 84
End: 2024-11-11
Payer: MEDICARE

## 2024-11-11 VITALS
HEIGHT: 63 IN | HEART RATE: 67 BPM | SYSTOLIC BLOOD PRESSURE: 149 MMHG | OXYGEN SATURATION: 100 % | DIASTOLIC BLOOD PRESSURE: 62 MMHG | BODY MASS INDEX: 21.44 KG/M2 | WEIGHT: 121 LBS | RESPIRATION RATE: 18 BRPM

## 2024-11-11 DIAGNOSIS — D64.9 ANEMIA, UNSPECIFIED TYPE: Primary | ICD-10-CM

## 2024-11-11 DIAGNOSIS — R15.9 INCONTINENCE OF FECES, UNSPECIFIED FECAL INCONTINENCE TYPE: ICD-10-CM

## 2024-11-11 DIAGNOSIS — R19.5 POSITIVE OCCULT STOOL BLOOD TEST: ICD-10-CM

## 2024-11-11 DIAGNOSIS — R19.5 DARK STOOLS: ICD-10-CM

## 2024-11-11 DIAGNOSIS — K52.9 CHRONIC DIARRHEA: ICD-10-CM

## 2024-11-11 LAB — ERYTHROCYTE [SEDIMENTATION RATE] IN BLOOD: 20 MM/HR (ref 0–30)

## 2024-11-11 PROCEDURE — 99204 OFFICE O/P NEW MOD 45 MIN: CPT | Performed by: PHYSICIAN ASSISTANT

## 2024-11-11 PROCEDURE — G8399 PT W/DXA RESULTS DOCUMENT: HCPCS | Performed by: PHYSICIAN ASSISTANT

## 2024-11-11 PROCEDURE — 1159F MED LIST DOCD IN RCRD: CPT | Performed by: PHYSICIAN ASSISTANT

## 2024-11-11 PROCEDURE — 1123F ACP DISCUSS/DSCN MKR DOCD: CPT | Performed by: PHYSICIAN ASSISTANT

## 2024-11-11 PROCEDURE — G8484 FLU IMMUNIZE NO ADMIN: HCPCS | Performed by: PHYSICIAN ASSISTANT

## 2024-11-11 PROCEDURE — 3077F SYST BP >= 140 MM HG: CPT | Performed by: PHYSICIAN ASSISTANT

## 2024-11-11 PROCEDURE — G8420 CALC BMI NORM PARAMETERS: HCPCS | Performed by: PHYSICIAN ASSISTANT

## 2024-11-11 PROCEDURE — 1036F TOBACCO NON-USER: CPT | Performed by: PHYSICIAN ASSISTANT

## 2024-11-11 PROCEDURE — G8427 DOCREV CUR MEDS BY ELIG CLIN: HCPCS | Performed by: PHYSICIAN ASSISTANT

## 2024-11-11 PROCEDURE — 1090F PRES/ABSN URINE INCON ASSESS: CPT | Performed by: PHYSICIAN ASSISTANT

## 2024-11-11 PROCEDURE — 1160F RVW MEDS BY RX/DR IN RCRD: CPT | Performed by: PHYSICIAN ASSISTANT

## 2024-11-11 PROCEDURE — 3078F DIAST BP <80 MM HG: CPT | Performed by: PHYSICIAN ASSISTANT

## 2024-11-11 NOTE — PROGRESS NOTES
Dae Correa (:  1940) is a 84 y.o. female new patient referred to our office for evaluation of the following chief complaint(s):  New Patient        Assessment & Plan   ASSESSMENT/PLAN:  1. Anemia, unspecified type  2. Positive occult stool blood test  3. Dark stools  -     H. Pylori Antigen, Stool; Future  4. Incontinence of feces, unspecified fecal incontinence type  5. Chronic diarrhea  -     Calprotectin Stool; Future  -     Sedimentation Rate; Future  -     MISCELLANEOUS SENDOUT fecal elastase; Future  -     Culture, Stool; Future  -     Celiac Ab tTg DGP TIgA; Future  -     H. Pylori Antigen, Stool; Future  -     C-Reactive Protein; Future      -Discussed differential for anemia, FOBT+ specific to GI including but not limited to: GERD, PUD, H.pylori infection, AVM, colon polyp or mass, IBD. Differential for diarrhea may also include: biliary pathology including bile salt diarrhea, EPI, thyroid dysfunction, medication side effects, microscopic colitis. Offered EGD and colonoscopy with lengthy discussion of risks versus benefits. She respectfully declines and is very tearful through discussion; at this point does not wish to pursue invasive testing options without discussing further with her daughter and family members. She is open to lab and stool testing in the interim. Will bring back for close follow-up 1 month or sooner PRN if symptoms worsen. Or if she wishes to pursue endoscopy she'll reach out and we can arrange over the phone.   -Counseled patient and provided written recommendations for diet and lifestyle modifications for GERD. Avoid tobacco, alcohol, NSAIDs.  Currently prescribed Prilosec 40 mg daily; continue this dose. Counseled on basic diarrhea measures. Start Imodium 1-2 tablets up to QID. Follow-up 1 month to review results.     Subjective   SUBJECTIVE/OBJECTIVE  Dae Correa is a 84 y.o. year old female referred to our office for evaluation of melena. Scanned referral note

## 2024-11-11 NOTE — PATIENT INSTRUCTIONS
For reflux:   Eat smaller and more frequent meals. Avoid lying down for 3 hours after eating. Elevate the head of the bed by 6 inches. Avoid wearing tight-fitting clothing. Stop smoking and avoid alcohol. Avoid NSAID medications (Aspirin, Excedrin, Aleve, Ibuprofen, Goody Powder, Toradol, Mobic, Voltaren, etc). Consider weight loss to get to a healthy weight, which is often critical to eliminating symptoms of reflux. Avoid foods and acid-containing beverages that can exacerbate the symptoms of reflux. This includes:     -Caffeine  -Chocolate  -Peppermint  -Alcohol (especially red wine)  -Carbonated beverages  -Citrus fruits  -Tomato-based products  -Vinegar  -Spicy and greasy foods     For diarrhea:   Take Imodium 1-2 tablets up to four times daily as needed for diarrhea. If you are frequently having urgent diarrhea following meals, schedule Imodium dose 30 minutes prior to meals. Be sure to stay well hydrated. Avoid alcohol as well as food and beverages high in fat, sugar, artificial sweeteners or stimulants such as nicotine or caffeine. Increase daily fiber intake to 25-35 grams daily either by dietary intake or an over-the-counter psyllium husk fiber supplement. Keep a diet journal to evaluate for any food triggers.

## 2024-11-12 ENCOUNTER — TELEPHONE (OUTPATIENT)
Dept: ORTHOPEDIC SURGERY | Age: 84
End: 2024-11-12

## 2024-11-12 LAB — CRP SERPL-MCNC: <0.3 MG/DL (ref 0–0.4)

## 2024-11-13 ENCOUNTER — HOSPITAL ENCOUNTER (EMERGENCY)
Age: 84
Discharge: HOME | End: 2024-11-13
Attending: EMERGENCY MEDICINE
Payer: MEDICARE

## 2024-11-13 ENCOUNTER — APPOINTMENT (OUTPATIENT)
Dept: GENERAL RADIOLOGY | Age: 84
End: 2024-11-13
Payer: MEDICARE

## 2024-11-13 VITALS
RESPIRATION RATE: 21 BRPM | TEMPERATURE: 97.6 F | DIASTOLIC BLOOD PRESSURE: 66 MMHG | HEIGHT: 63 IN | SYSTOLIC BLOOD PRESSURE: 155 MMHG | WEIGHT: 121 LBS | HEART RATE: 106 BPM | OXYGEN SATURATION: 97 % | BODY MASS INDEX: 21.44 KG/M2

## 2024-11-13 DIAGNOSIS — E86.0 DEHYDRATION: ICD-10-CM

## 2024-11-13 DIAGNOSIS — M50.30 DDD (DEGENERATIVE DISC DISEASE), CERVICAL: Primary | ICD-10-CM

## 2024-11-13 DIAGNOSIS — M51.369 DEGENERATION OF INTERVERTEBRAL DISC OF LUMBAR REGION, UNSPECIFIED WHETHER PAIN PRESENT: ICD-10-CM

## 2024-11-13 LAB
ANION GAP SERPL CALC-SCNC: 13 MMOL/L (ref 7–16)
BASOPHILS # BLD: 0.1 K/UL (ref 0–0.2)
BASOPHILS NFR BLD: 1 % (ref 0–2)
BUN SERPL-MCNC: 21 MG/DL (ref 8–23)
CALCIUM SERPL-MCNC: 9.9 MG/DL (ref 8.8–10.2)
CHLORIDE SERPL-SCNC: 100 MMOL/L (ref 98–107)
CO2 SERPL-SCNC: 21 MMOL/L (ref 20–29)
CREAT SERPL-MCNC: 0.94 MG/DL (ref 0.6–1.1)
DIFFERENTIAL METHOD BLD: ABNORMAL
EKG ATRIAL RATE: 99 BPM
EKG DIAGNOSIS: NORMAL
EKG P AXIS: 60 DEGREES
EKG P-R INTERVAL: 143 MS
EKG Q-T INTERVAL: 351 MS
EKG QRS DURATION: 90 MS
EKG QTC CALCULATION (BAZETT): 451 MS
EKG R AXIS: 16 DEGREES
EKG T AXIS: -1 DEGREES
EKG VENTRICULAR RATE: 99 BPM
EOSINOPHIL # BLD: 0.2 K/UL (ref 0–0.8)
EOSINOPHIL NFR BLD: 2 % (ref 0.5–7.8)
ERYTHROCYTE [DISTWIDTH] IN BLOOD BY AUTOMATED COUNT: 15.9 % (ref 11.9–14.6)
GLUCOSE SERPL-MCNC: 90 MG/DL (ref 70–99)
HCT VFR BLD AUTO: 34.2 % (ref 35.8–46.3)
HGB BLD-MCNC: 11.1 G/DL (ref 11.7–15.4)
IMM GRANULOCYTES # BLD AUTO: 0 K/UL (ref 0–0.5)
IMM GRANULOCYTES NFR BLD AUTO: 0 % (ref 0–5)
LYMPHOCYTES # BLD: 2.5 K/UL (ref 0.5–4.6)
LYMPHOCYTES NFR BLD: 30 % (ref 13–44)
MCH RBC QN AUTO: 28.2 PG (ref 26.1–32.9)
MCHC RBC AUTO-ENTMCNC: 32.5 G/DL (ref 31.4–35)
MCV RBC AUTO: 87 FL (ref 82–102)
MONOCYTES # BLD: 0.8 K/UL (ref 0.1–1.3)
MONOCYTES NFR BLD: 10 % (ref 4–12)
NEUTS SEG # BLD: 4.8 K/UL (ref 1.7–8.2)
NEUTS SEG NFR BLD: 57 % (ref 43–78)
NRBC # BLD: 0 K/UL (ref 0–0.2)
PLATELET # BLD AUTO: 311 K/UL (ref 150–450)
PMV BLD AUTO: 10 FL (ref 9.4–12.3)
POTASSIUM SERPL-SCNC: 4 MMOL/L (ref 3.5–5.1)
RBC # BLD AUTO: 3.93 M/UL (ref 4.05–5.2)
SODIUM SERPL-SCNC: 134 MMOL/L (ref 136–145)
TROPONIN T SERPL HS-MCNC: 25 NG/L (ref 0–14)
WBC # BLD AUTO: 8.5 K/UL (ref 4.3–11.1)

## 2024-11-13 PROCEDURE — 99285 EMERGENCY DEPT VISIT HI MDM: CPT

## 2024-11-13 PROCEDURE — 72100 X-RAY EXAM L-S SPINE 2/3 VWS: CPT

## 2024-11-13 PROCEDURE — 85025 COMPLETE CBC W/AUTO DIFF WBC: CPT

## 2024-11-13 PROCEDURE — 93005 ELECTROCARDIOGRAM TRACING: CPT | Performed by: EMERGENCY MEDICINE

## 2024-11-13 PROCEDURE — 96374 THER/PROPH/DIAG INJ IV PUSH: CPT

## 2024-11-13 PROCEDURE — 71046 X-RAY EXAM CHEST 2 VIEWS: CPT

## 2024-11-13 PROCEDURE — 6360000002 HC RX W HCPCS: Performed by: EMERGENCY MEDICINE

## 2024-11-13 PROCEDURE — 6370000000 HC RX 637 (ALT 250 FOR IP): Performed by: EMERGENCY MEDICINE

## 2024-11-13 PROCEDURE — 84484 ASSAY OF TROPONIN QUANT: CPT

## 2024-11-13 PROCEDURE — 96361 HYDRATE IV INFUSION ADD-ON: CPT

## 2024-11-13 PROCEDURE — 80048 BASIC METABOLIC PNL TOTAL CA: CPT

## 2024-11-13 PROCEDURE — 72040 X-RAY EXAM NECK SPINE 2-3 VW: CPT

## 2024-11-13 PROCEDURE — 2580000003 HC RX 258: Performed by: EMERGENCY MEDICINE

## 2024-11-13 PROCEDURE — 93010 ELECTROCARDIOGRAM REPORT: CPT | Performed by: INTERNAL MEDICINE

## 2024-11-13 PROCEDURE — 73562 X-RAY EXAM OF KNEE 3: CPT

## 2024-11-13 RX ORDER — CYCLOBENZAPRINE HCL 10 MG
10 TABLET ORAL 3 TIMES DAILY PRN
Qty: 15 TABLET | Refills: 0 | Status: SHIPPED | OUTPATIENT
Start: 2024-11-13 | End: 2024-11-23

## 2024-11-13 RX ORDER — 0.9 % SODIUM CHLORIDE 0.9 %
1000 INTRAVENOUS SOLUTION INTRAVENOUS
Status: COMPLETED | OUTPATIENT
Start: 2024-11-13 | End: 2024-11-13

## 2024-11-13 RX ORDER — CYCLOBENZAPRINE HCL 10 MG
10 TABLET ORAL
Status: COMPLETED | OUTPATIENT
Start: 2024-11-13 | End: 2024-11-13

## 2024-11-13 RX ORDER — KETOROLAC TROMETHAMINE 30 MG/ML
15 INJECTION, SOLUTION INTRAMUSCULAR; INTRAVENOUS ONCE
Status: COMPLETED | OUTPATIENT
Start: 2024-11-13 | End: 2024-11-13

## 2024-11-13 RX ORDER — PREDNISONE 20 MG/1
20 TABLET ORAL 2 TIMES DAILY
Qty: 10 TABLET | Refills: 0 | Status: SHIPPED | OUTPATIENT
Start: 2024-11-13 | End: 2024-11-18

## 2024-11-13 RX ORDER — MORPHINE SULFATE 4 MG/ML
4 INJECTION INTRAVENOUS
Status: DISCONTINUED | OUTPATIENT
Start: 2024-11-13 | End: 2024-11-13 | Stop reason: HOSPADM

## 2024-11-13 RX ADMIN — SODIUM CHLORIDE 1000 ML: 9 INJECTION, SOLUTION INTRAVENOUS at 09:50

## 2024-11-13 RX ADMIN — CYCLOBENZAPRINE HYDROCHLORIDE 10 MG: 10 TABLET, FILM COATED ORAL at 09:31

## 2024-11-13 RX ADMIN — KETOROLAC TROMETHAMINE 15 MG: 30 INJECTION, SOLUTION INTRAMUSCULAR at 09:31

## 2024-11-13 ASSESSMENT — PAIN DESCRIPTION - ORIENTATION: ORIENTATION: LEFT

## 2024-11-13 ASSESSMENT — PAIN SCALES - GENERAL
PAINLEVEL_OUTOF10: 10
PAINLEVEL_OUTOF10: 10
PAINLEVEL_OUTOF10: 5

## 2024-11-13 ASSESSMENT — PAIN DESCRIPTION - DESCRIPTORS: DESCRIPTORS: ACHING

## 2024-11-13 ASSESSMENT — PAIN DESCRIPTION - LOCATION
LOCATION: NECK;SHOULDER;ARM
LOCATION: ARM;NECK

## 2024-11-13 ASSESSMENT — PAIN - FUNCTIONAL ASSESSMENT: PAIN_FUNCTIONAL_ASSESSMENT: 0-10

## 2024-11-13 NOTE — ED NOTES
Patient mobility status  with no difficulty.     I have reviewed discharge instructions with the patient.  The patient verbalized understanding.    Patient left ED via Discharge Method: wheelchair to Home with  FAMILY .    Opportunity for questions and clarification provided.     Patient given 2 scripts.            Diego Root RN  11/13/24 1200

## 2024-11-13 NOTE — DISCHARGE INSTRUCTIONS
Ice your neck and back for 20 minutes at a time, 4 times a day.  Take the steroid to help decrease inflammation and relieve your discomfort.  This may take 36 hours to begin to make a big difference.  Use the muscle relaxer as needed for pain particularly at night prior to sleeping.    Use precautions when taking these medications and getting up to walk to make sure you are not feeling lightheaded, and minimize risk of falls.

## 2024-11-13 NOTE — ED TRIAGE NOTES
Pt arrives home via EMS w/ cc of L sided neck shoulder and arm pain that started today. Pt hx sciatica, a-fib, COPD, DM. 161/79 hr 89 bgl 101 Pt A&O x 4

## 2024-11-13 NOTE — ED PROVIDER NOTES
QRS Duration 90 ms    Q-T Interval 351 ms    QTc Calculation (Bazett) 451 ms    P Axis 60 degrees    R Axis 16 degrees    T Axis -1 degrees    Diagnosis       Sinus rhythm  Probable left atrial enlargement    Confirmed by Gilbert Silva MD ([id number]) on 11/13/2024 9:40:49 AM           XR CERVICAL SPINE (2-3 VIEWS)   Final Result   1. Straightening of the mid cervical lordosis.   2. Minimal to moderate degenerative osteoarthritis of the C3-C4, C4-C5, C5-C6   and C6-C7 resulting in moderate to severe narrowing of the corresponding   intervertebral disc spaces.         Electronically signed by remoceank E Mercho      XR LUMBAR SPINE (2-3 VIEWS)   Final Result   1. Mild levoscoliosis mid lumbar spine.   2. 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   3. No significant change when compared to the previous examination.               Electronically signed by remoceank E Mercho      XR CHEST (2 VW)   Final Result   No acute findings in the chest         Electronically signed by remoceank E Mercho      XR KNEE LEFT (3 VIEWS)   Final Result   Negative left knee         Electronically signed by remoceank E Mercho                   No results for input(s): \"COVID19\" in the last 72 hours.     Voice dictation software was used during the making of this note.  This software is not perfect and grammatical and other typographical errors may be present.  This note has not been completely proofread for errors.     Desmond, Tam CARPIO MD  11/13/24 6728

## 2024-11-14 LAB
GLIADIN PEPTIDE IGA SER-ACNC: 10 UNITS (ref 0–19)
GLIADIN PEPTIDE IGG SER-ACNC: 3 UNITS (ref 0–19)
IGA SERPL-MCNC: 296 MG/DL (ref 64–422)
TTG IGA SER-ACNC: <2 U/ML (ref 0–3)
TTG IGG SER-ACNC: <2 U/ML (ref 0–5)

## 2024-11-18 ENCOUNTER — TELEPHONE (OUTPATIENT)
Dept: ORTHOPEDIC SURGERY | Age: 84
End: 2024-11-18

## 2024-11-18 ENCOUNTER — OFFICE VISIT (OUTPATIENT)
Dept: ORTHOPEDIC SURGERY | Age: 84
End: 2024-11-18
Payer: MEDICARE

## 2024-11-18 DIAGNOSIS — M54.16 LUMBAR RADICULOPATHY: ICD-10-CM

## 2024-11-18 DIAGNOSIS — M54.50 LUMBAR BACK PAIN: ICD-10-CM

## 2024-11-18 DIAGNOSIS — M47.816 SPONDYLOSIS OF LUMBAR REGION WITHOUT MYELOPATHY OR RADICULOPATHY: Primary | ICD-10-CM

## 2024-11-18 DIAGNOSIS — M79.10 MYALGIA, UNSPECIFIED SITE: ICD-10-CM

## 2024-11-18 DIAGNOSIS — M54.17 LUMBOSACRAL RADICULOPATHY: Primary | ICD-10-CM

## 2024-11-18 PROCEDURE — 1036F TOBACCO NON-USER: CPT | Performed by: PHYSICAL MEDICINE & REHABILITATION

## 2024-11-18 PROCEDURE — 1090F PRES/ABSN URINE INCON ASSESS: CPT | Performed by: PHYSICAL MEDICINE & REHABILITATION

## 2024-11-18 PROCEDURE — G8484 FLU IMMUNIZE NO ADMIN: HCPCS | Performed by: PHYSICAL MEDICINE & REHABILITATION

## 2024-11-18 PROCEDURE — G8428 CUR MEDS NOT DOCUMENT: HCPCS | Performed by: PHYSICAL MEDICINE & REHABILITATION

## 2024-11-18 PROCEDURE — G8399 PT W/DXA RESULTS DOCUMENT: HCPCS | Performed by: PHYSICAL MEDICINE & REHABILITATION

## 2024-11-18 PROCEDURE — G2211 COMPLEX E/M VISIT ADD ON: HCPCS | Performed by: PHYSICAL MEDICINE & REHABILITATION

## 2024-11-18 PROCEDURE — 99214 OFFICE O/P EST MOD 30 MIN: CPT | Performed by: PHYSICAL MEDICINE & REHABILITATION

## 2024-11-18 PROCEDURE — 1123F ACP DISCUSS/DSCN MKR DOCD: CPT | Performed by: PHYSICAL MEDICINE & REHABILITATION

## 2024-11-18 PROCEDURE — G8420 CALC BMI NORM PARAMETERS: HCPCS | Performed by: PHYSICAL MEDICINE & REHABILITATION

## 2024-11-18 NOTE — TELEPHONE ENCOUNTER
Her daughter is calling about the visit today and the epidural that was suggested. They have decided they do want to move forward with the epidural. Please call to discuss next steps.

## 2024-11-20 NOTE — PROGRESS NOTES
Date:  11/20/24     HPI:  I am seeing Dae Correa in evalution/folowup.  I saw her last in the office setting just over a year ago.  I have seen her primarily for cervical spine pain, has had pain in the lower cervical paraspinal areas, in the past was worse if she turns her head to either side.  She is in palliative care but is not receiving controlled substances.  She has COPD and debility.  CT scanning of cervical spine reveals relatively mild degenerative changes.  She has had cervical trigger point injections in February and most recently in July for which she has had benefit.    She has had worsening pain in the cervical spine, gravitates into the left shoulder and trapezius area.  No accident or injury.  She had cervical spine films in the emergency department that revealed degenerative changes.  She was given morphine Toradol and cyclobenzaprine.  She was released on oral steroids.  She may have some improvement.    She has had pain in the lumbar spine.  Noting pain in the left buttock primarily also lower lumbar paraspinal areas.  It is nonradiating pain and is worse with standing and walking.  Nothing really seems to help it.  She feels diffusely weak.  She avoids anti-inflammatory medications but does take Tylenol.  At baseline she ambulates with a walker but presents today in a wheelchair because of some of her pain.  She was evaluated in the emergency department and lumbar spine films reveal degenerative changes, scoliotic changes but no significant changes compared to a prior image of which I think this was over the summer.    Her current pain scale is 9/10.  She cannot perform any type of meaningful lumbar spine exercises or physical therapy due to her debility.  Family also feels that those have produced discomfort in the past.  She is currently not felt to be a surgical candidate.    Note in the past she has had numerous spine injections, failed spinal stimulator implantation.  She currently

## 2024-11-25 ENCOUNTER — OFFICE VISIT (OUTPATIENT)
Dept: ORTHOPEDIC SURGERY | Age: 84
End: 2024-11-25
Payer: MEDICARE

## 2024-11-25 DIAGNOSIS — M54.16 LUMBAR RADICULOPATHY: ICD-10-CM

## 2024-11-25 DIAGNOSIS — M54.2 CERVICAL SPINE PAIN: Primary | ICD-10-CM

## 2024-11-25 DIAGNOSIS — M79.10 MYALGIA, UNSPECIFIED SITE: ICD-10-CM

## 2024-11-25 PROCEDURE — 62323 NJX INTERLAMINAR LMBR/SAC: CPT | Performed by: PHYSICAL MEDICINE & REHABILITATION

## 2024-11-25 PROCEDURE — 20552 NJX 1/MLT TRIGGER POINT 1/2: CPT | Performed by: PHYSICAL MEDICINE & REHABILITATION

## 2024-11-25 RX ORDER — TRIAMCINOLONE ACETONIDE 40 MG/ML
100 INJECTION, SUSPENSION INTRA-ARTICULAR; INTRAMUSCULAR ONCE
Status: COMPLETED | OUTPATIENT
Start: 2024-11-25 | End: 2024-11-25

## 2024-11-25 RX ORDER — TRIAMCINOLONE ACETONIDE 40 MG/ML
120 INJECTION, SUSPENSION INTRA-ARTICULAR; INTRAMUSCULAR ONCE
Status: COMPLETED | OUTPATIENT
Start: 2024-11-25 | End: 2024-11-25

## 2024-11-25 RX ADMIN — TRIAMCINOLONE ACETONIDE 120 MG: 40 INJECTION, SUSPENSION INTRA-ARTICULAR; INTRAMUSCULAR at 14:59

## 2024-11-25 RX ADMIN — TRIAMCINOLONE ACETONIDE 100 MG: 40 INJECTION, SUSPENSION INTRA-ARTICULAR; INTRAMUSCULAR at 14:49

## 2024-11-25 NOTE — PROGRESS NOTES
was confirmed, 1.5 cc of sterile water and 100 mg of Kenalog were injected through the spinal needle.     After informed oral consent, patient was prepped per routine. The left lower cervical paraspinal area and medial trapezius area were injected. 60 mg of Kenalog with 1 cc of 0.25% Marcaine injected at each site. Patient tolerated well. Band aid(s) applied.      Fluoroscopic guidance was used intermittently over a 10-minute period to insure proper needle placement and patient safety. A hard copy of the fluoroscopic  images has been placed in the patient's chart. The patient was monitored after the procedure and discharged home without complication.     A total of two muscles/sites injected today for trigger point charge.    A total of 5.5 cc of Kenalog were administered during this procedure.    Resume Meds:  N/A    DEMETRIO LOPEZ JR, MD  11/25/24

## 2024-11-26 ENCOUNTER — TELEPHONE (OUTPATIENT)
Age: 84
End: 2024-11-26

## 2024-11-26 NOTE — TELEPHONE ENCOUNTER
----- Message from Melissa Grinnell, PA-C sent at 11/25/2024  7:32 AM EST -----  Fecal occult blood test was negative.

## 2024-11-26 NOTE — TELEPHONE ENCOUNTER
Call pt no answer. Lvm to patient with message from provider.   Fecal occult blood test was negative. Told pt to call office or send [employer] message if any other questions or concerns.

## 2024-12-01 NOTE — PROGRESS NOTES
emphasized    Fibrillation, atrial (HCC)-paroxysmal, no clinical recurrence since recent ER visit.  Continue long-acting metoprolol-she is encouraged to call with any recurrent symptomatic tachypalpitations in the near future.  Offered Watchman consult but she prefers to avoid any surgical procedures and doesn't want to discuss procedure at this point.     Moderate COPD (chronic obstructive pulmonary disease) (HCC)-continue inhalers, per PCP    Mixed hyperlipidemia-Per PCP, continue healthy diet.                 Return in about 6 months (around 6/2/2025).         SUJATA TUTTLE MD  12/02/24  11:46 AM

## 2024-12-02 ENCOUNTER — TELEPHONE (OUTPATIENT)
Dept: PULMONOLOGY | Age: 84
End: 2024-12-02

## 2024-12-02 ENCOUNTER — OFFICE VISIT (OUTPATIENT)
Age: 84
End: 2024-12-02
Payer: MEDICARE

## 2024-12-02 VITALS
BODY MASS INDEX: 22.5 KG/M2 | DIASTOLIC BLOOD PRESSURE: 80 MMHG | HEIGHT: 63 IN | SYSTOLIC BLOOD PRESSURE: 134 MMHG | WEIGHT: 127 LBS | HEART RATE: 80 BPM

## 2024-12-02 DIAGNOSIS — I65.23 BILATERAL CAROTID ARTERY STENOSIS WITHOUT CEREBRAL INFARCTION: ICD-10-CM

## 2024-12-02 DIAGNOSIS — I10 PRIMARY HYPERTENSION: ICD-10-CM

## 2024-12-02 DIAGNOSIS — I27.20 PULMONARY HYPERTENSION (HCC): ICD-10-CM

## 2024-12-02 DIAGNOSIS — I73.9 PVD (PERIPHERAL VASCULAR DISEASE) (HCC): ICD-10-CM

## 2024-12-02 DIAGNOSIS — R60.0 BILATERAL LEG EDEMA: ICD-10-CM

## 2024-12-02 DIAGNOSIS — I25.10 CORONARY ARTERY DISEASE INVOLVING NATIVE CORONARY ARTERY OF NATIVE HEART WITHOUT ANGINA PECTORIS: Primary | ICD-10-CM

## 2024-12-02 PROCEDURE — 1125F AMNT PAIN NOTED PAIN PRSNT: CPT | Performed by: INTERNAL MEDICINE

## 2024-12-02 PROCEDURE — G8399 PT W/DXA RESULTS DOCUMENT: HCPCS | Performed by: INTERNAL MEDICINE

## 2024-12-02 PROCEDURE — 99214 OFFICE O/P EST MOD 30 MIN: CPT | Performed by: INTERNAL MEDICINE

## 2024-12-02 PROCEDURE — G8484 FLU IMMUNIZE NO ADMIN: HCPCS | Performed by: INTERNAL MEDICINE

## 2024-12-02 PROCEDURE — 3079F DIAST BP 80-89 MM HG: CPT | Performed by: INTERNAL MEDICINE

## 2024-12-02 PROCEDURE — 1036F TOBACCO NON-USER: CPT | Performed by: INTERNAL MEDICINE

## 2024-12-02 PROCEDURE — 1123F ACP DISCUSS/DSCN MKR DOCD: CPT | Performed by: INTERNAL MEDICINE

## 2024-12-02 PROCEDURE — 93000 ELECTROCARDIOGRAM COMPLETE: CPT | Performed by: INTERNAL MEDICINE

## 2024-12-02 PROCEDURE — G8427 DOCREV CUR MEDS BY ELIG CLIN: HCPCS | Performed by: INTERNAL MEDICINE

## 2024-12-02 PROCEDURE — 1159F MED LIST DOCD IN RCRD: CPT | Performed by: INTERNAL MEDICINE

## 2024-12-02 PROCEDURE — 1160F RVW MEDS BY RX/DR IN RCRD: CPT | Performed by: INTERNAL MEDICINE

## 2024-12-02 PROCEDURE — 1090F PRES/ABSN URINE INCON ASSESS: CPT | Performed by: INTERNAL MEDICINE

## 2024-12-02 PROCEDURE — 3075F SYST BP GE 130 - 139MM HG: CPT | Performed by: INTERNAL MEDICINE

## 2024-12-02 PROCEDURE — G8420 CALC BMI NORM PARAMETERS: HCPCS | Performed by: INTERNAL MEDICINE

## 2024-12-02 RX ORDER — PREDNISONE 20 MG/1
TABLET ORAL
Qty: 15 TABLET | Refills: 0 | Status: SHIPPED | OUTPATIENT
Start: 2024-12-02

## 2024-12-02 RX ORDER — DOXYCYCLINE HYCLATE 100 MG
100 TABLET ORAL 2 TIMES DAILY
Qty: 20 TABLET | Refills: 0 | Status: SHIPPED | OUTPATIENT
Start: 2024-12-02 | End: 2024-12-12

## 2024-12-02 RX ORDER — METOPROLOL SUCCINATE 50 MG/1
50 TABLET, EXTENDED RELEASE ORAL DAILY
Qty: 90 TABLET | Refills: 3 | Status: SHIPPED | OUTPATIENT
Start: 2024-12-02

## 2024-12-02 RX ORDER — LEVOCETIRIZINE DIHYDROCHLORIDE 5 MG/1
5 TABLET, FILM COATED ORAL NIGHTLY
COMMUNITY

## 2024-12-02 ASSESSMENT — ENCOUNTER SYMPTOMS
COUGH: 1
WHEEZING: 1

## 2024-12-02 NOTE — TELEPHONE ENCOUNTER
LOV 6/26/24   Hx of COPD, Bronchiectasis, Nocturnal hypoxemia    Patients daughter Cas called reporting mother is coughing up thick green sputum, chest congestion, wheezing, having headaches, fatigued for the last week. No fever, chills, or sore throat. Has not tested for flu or covid. Thinks she may need antibiotic and steroid. Checks 02 levels and stays about 95%    Currently taking Trelegy in am and Daliresp. Mucinex OTC not sure of dose. not using nebulizer or albuterol nebulizer solution or sodium chloride solution.

## 2024-12-02 NOTE — TELEPHONE ENCOUNTER
Will send in RX for steroids and doxycyline 100 bid x 10 days.  Needs to be seen if she fails to improve.

## 2024-12-17 ENCOUNTER — TELEMEDICINE (OUTPATIENT)
Age: 84
End: 2024-12-17
Payer: MEDICARE

## 2024-12-17 DIAGNOSIS — R10.32 LEFT LOWER QUADRANT PAIN: Primary | ICD-10-CM

## 2024-12-17 DIAGNOSIS — R19.4 BOWEL HABIT CHANGES: ICD-10-CM

## 2024-12-17 DIAGNOSIS — R63.4 UNINTENTIONAL WEIGHT LOSS: ICD-10-CM

## 2024-12-17 DIAGNOSIS — R19.5 DARK STOOLS: ICD-10-CM

## 2024-12-17 PROCEDURE — 1036F TOBACCO NON-USER: CPT | Performed by: PHYSICIAN ASSISTANT

## 2024-12-17 PROCEDURE — 1160F RVW MEDS BY RX/DR IN RCRD: CPT | Performed by: PHYSICIAN ASSISTANT

## 2024-12-17 PROCEDURE — 1123F ACP DISCUSS/DSCN MKR DOCD: CPT | Performed by: PHYSICIAN ASSISTANT

## 2024-12-17 PROCEDURE — 1090F PRES/ABSN URINE INCON ASSESS: CPT | Performed by: PHYSICIAN ASSISTANT

## 2024-12-17 PROCEDURE — G8399 PT W/DXA RESULTS DOCUMENT: HCPCS | Performed by: PHYSICIAN ASSISTANT

## 2024-12-17 PROCEDURE — 99214 OFFICE O/P EST MOD 30 MIN: CPT | Performed by: PHYSICIAN ASSISTANT

## 2024-12-17 PROCEDURE — G8420 CALC BMI NORM PARAMETERS: HCPCS | Performed by: PHYSICIAN ASSISTANT

## 2024-12-17 PROCEDURE — 1159F MED LIST DOCD IN RCRD: CPT | Performed by: PHYSICIAN ASSISTANT

## 2024-12-17 PROCEDURE — G8427 DOCREV CUR MEDS BY ELIG CLIN: HCPCS | Performed by: PHYSICIAN ASSISTANT

## 2024-12-17 PROCEDURE — G8484 FLU IMMUNIZE NO ADMIN: HCPCS | Performed by: PHYSICIAN ASSISTANT

## 2024-12-17 NOTE — PROGRESS NOTES
Dae Correa (:  1940) is a 84 y.o. female new patient referred to our office for evaluation of the following chief complaint(s):  Follow-up        Assessment & Plan   ASSESSMENT/PLAN:  1. Left lower quadrant pain  2. Bowel habit changes  3. Dark stools  4. Unintentional weight loss    -ESR, CRP, celiac AB screening were WNL. Repeat FOBT 2024 was negative. Previously was having increased stool frequency and nocturnal stools, now leaning toward constipation. Chronic LLQ discomfort is unchanged. Has lost 9 lbs unintentionally since last month.  -Patient lives with daughter who assists with history as patient is very hard of hearing. We discussed options to pursue EGD and colonoscopy. Daughter and patient convey that she would not pursue any sort of treatment if a cancer were identified on endoscopy and they do not believe it would be worth her while to pursue endoscopy at this point. They plan to discuss CT imaging and will let me know if they'd like to purse that or not.     Dae Correa, was evaluated through a synchronous (real-time) audio-video encounter. The patient (or guardian if applicable) is aware that this is a billable service, which includes applicable co-pays. This Virtual Visit was conducted with patient's (and/or legal guardian's) consent. Patient identification was verified, and a caregiver was present when appropriate.   The patient was located at Home: [address] [place] [id number]  Provider was located at Facility (Appt Dept): [address] , [address]  [place] [address]  Confirm you are appropriately licensed, registered, or certified to deliver care in the [facility] where the patient is located as indicated above. If you are not or unsure, please re-schedule the visit: Yes, I confirm.        Total time spent for this encounter: Not billed by time    --Melissa R Grinnell, PA-C on 2024 at 2:44 PM    An electronic signature was used to authenticate

## 2024-12-30 NOTE — ED NOTES
Pt. Informed that we need a Urine sample. Pt. Requested to try a purewick. Pt. Hooked up to purewick.      Minoo Villalta RN  12/30/24 [id number]

## 2024-12-30 NOTE — ED PROVIDER NOTES
Emergency Department Provider Note       PCP: Syd Wei MD   Age: 84 y.o.   Sex: female     DISPOSITION Discharge - Pending Orders Complete 12/30/2024 08:29:29 PM    ICD-10-CM    1. Chronic atrial fibrillation (HCC)  I48.20           Medical Decision Making     This is an 84-year-old female with history of atrial fibrillation presenting with left lower quadrant abdominal pain.  She does have very minimal tenderness on exam of her abdomen.  She had a unremarkable workup as far as labs and CT imaging.  Was found to be back in atrial fibrillation with rate initially over 100.  She was given 10 mg of IV Cardizem with rate control.  Discussion with daughter and patient regarding restarting anticoagulants, these had previously been stopped related to GI bleeding and epistaxis.  Patient has had some issues with balance and daughter is fearful she may fall.  They elected to withhold anticoagulants at this time.  Will increase metoprolol to 75 mg daily and have her follow-up closely with cardiology.  Patient was additionally independently evaluated by ED attending who spoke with on-call cardiology.    ED Course as of 12/30/24 2050   Mon Dec 30, 2024   1607 84-year-old female presents with left lower quad abdominal pain.  Per daughter and started 3 days ago.  She has had some nausea without vomiting.  On exam she has minimal abdominal tenderness.  She is currently in A-fib.  She has had a history of A-fib, was diagnosed in January 2024 and started on Cardizem and anticoagulated.  She was unable to tolerate anticoagulation due to epistaxis and melena.  On EKG rate is around 101.  Additionally on chart review she had a televisit with gastroenterology on 12/13 for left lower quad abdominal pain with plans for EGD and colonoscopy.  Today we will plan for labs to include lactic and Pro-Zane given tachycardia.  Plan to CT abdomen.  Her abdomen is soft with minimal tenderness. [CJ]      ED Course User Index  [CJ] Sukhjinder

## 2024-12-30 NOTE — ED TRIAGE NOTES
Patient arrived from home complaining of LLQ abdominal pain radiating to the back with associated nausea. Patient has a history of diverticulitis. Denies urinary symptoms.

## 2024-12-31 NOTE — DISCHARGE INSTRUCTIONS
Start metoprolol 75 mg once daily. Call cardiology tomorrow for a follow up appointment. Also call the gastroenterologist for a follow up. Return for any worsening symptoms or concerns.

## 2024-12-31 NOTE — ED NOTES
Patient mobility status  with difficulty, uses a wheelchair .     I have reviewed discharge instructions with the patient and daughter.  The patient and daughter verbalized understanding.    Patient left ED via Discharge Method: wheelchair to Home with Child.    Opportunity for questions and clarification provided.     Patient given 1 scripts.            Minoo Villalta RN  12/30/24 [id number]

## 2025-01-04 LAB
BACTERIA SPEC CULT: NORMAL
SERVICE CMNT-IMP: NORMAL